# Patient Record
Sex: FEMALE | Race: WHITE | Employment: OTHER | URBAN - METROPOLITAN AREA
[De-identification: names, ages, dates, MRNs, and addresses within clinical notes are randomized per-mention and may not be internally consistent; named-entity substitution may affect disease eponyms.]

---

## 2019-05-13 ENCOUNTER — DOCUMENTATION ONLY (OUTPATIENT)
Dept: INTERNAL MEDICINE CLINIC | Facility: HOSPITAL | Age: 84
End: 2019-05-13

## 2020-01-07 ENCOUNTER — HOSPITAL ENCOUNTER (EMERGENCY)
Facility: HOSPITAL | Age: 85
Discharge: HOME | End: 2020-01-07
Attending: EMERGENCY MEDICINE
Payer: MEDICARE

## 2020-01-07 ENCOUNTER — APPOINTMENT (OUTPATIENT)
Dept: CT IMAGING | Facility: HOSPITAL | Age: 85
End: 2020-01-07
Attending: EMERGENCY MEDICINE
Payer: MEDICARE

## 2020-01-07 ENCOUNTER — APPOINTMENT (OUTPATIENT)
Dept: GENERAL RADIOLOGY | Facility: HOSPITAL | Age: 85
End: 2020-01-07
Attending: EMERGENCY MEDICINE
Payer: MEDICARE

## 2020-01-07 VITALS
WEIGHT: 146 LBS | DIASTOLIC BLOOD PRESSURE: 71 MMHG | RESPIRATION RATE: 18 BRPM | HEIGHT: 64 IN | SYSTOLIC BLOOD PRESSURE: 142 MMHG | HEART RATE: 72 BPM | TEMPERATURE: 97 F | BODY MASS INDEX: 24.92 KG/M2 | OXYGEN SATURATION: 98 %

## 2020-01-07 DIAGNOSIS — J20.6 ACUTE BRONCHITIS DUE TO RHINOVIRUS: Primary | ICD-10-CM

## 2020-01-07 LAB
ADENOVIRUS PCR:: NEGATIVE
ALBUMIN SERPL-MCNC: 3.7 G/DL (ref 3.4–5)
ALBUMIN/GLOB SERPL: 0.8 {RATIO} (ref 1–2)
ALP LIVER SERPL-CCNC: 63 U/L (ref 55–142)
ALT SERPL-CCNC: 12 U/L (ref 13–56)
ANION GAP SERPL CALC-SCNC: 9 MMOL/L (ref 0–18)
AST SERPL-CCNC: 18 U/L (ref 15–37)
ATRIAL RATE: 72 BPM
B PERT DNA SPEC QL NAA+PROBE: NEGATIVE
BASOPHILS # BLD AUTO: 0.06 X10(3) UL (ref 0–0.2)
BASOPHILS NFR BLD AUTO: 0.8 %
BILIRUB SERPL-MCNC: 0.4 MG/DL (ref 0.1–2)
BUN BLD-MCNC: 14 MG/DL (ref 7–18)
BUN/CREAT SERPL: 14 (ref 10–20)
C PNEUM DNA SPEC QL NAA+PROBE: NEGATIVE
CALCIUM BLD-MCNC: 10.1 MG/DL (ref 8.5–10.1)
CHLORIDE SERPL-SCNC: 106 MMOL/L (ref 98–112)
CO2 SERPL-SCNC: 23 MMOL/L (ref 21–32)
CORONAVIRUS 229E PCR:: NEGATIVE
CORONAVIRUS HKU1 PCR:: NEGATIVE
CORONAVIRUS NL63 PCR:: NEGATIVE
CORONAVIRUS OC43 PCR:: NEGATIVE
CREAT BLD-MCNC: 1 MG/DL (ref 0.55–1.02)
D-DIMER: 3.71 UG/ML FEU (ref ?–0.88)
DEPRECATED RDW RBC AUTO: 45.2 FL (ref 35.1–46.3)
EOSINOPHIL # BLD AUTO: 0.14 X10(3) UL (ref 0–0.7)
EOSINOPHIL NFR BLD AUTO: 1.8 %
ERYTHROCYTE [DISTWIDTH] IN BLOOD BY AUTOMATED COUNT: 13.2 % (ref 11–15)
FLUAV RNA SPEC QL NAA+PROBE: NEGATIVE
FLUBV RNA SPEC QL NAA+PROBE: NEGATIVE
GLOBULIN PLAS-MCNC: 4.7 G/DL (ref 2.8–4.4)
GLUCOSE BLD-MCNC: 92 MG/DL (ref 70–99)
HCT VFR BLD AUTO: 42.8 % (ref 35–48)
HGB BLD-MCNC: 14.4 G/DL (ref 12–16)
IMM GRANULOCYTES # BLD AUTO: 0.02 X10(3) UL (ref 0–1)
IMM GRANULOCYTES NFR BLD: 0.3 %
LIPASE SERPL-CCNC: 161 U/L (ref 73–393)
LYMPHOCYTES # BLD AUTO: 2.77 X10(3) UL (ref 1–4)
LYMPHOCYTES NFR BLD AUTO: 36.2 %
M PROTEIN MFR SERPL ELPH: 8.4 G/DL (ref 6.4–8.2)
MCH RBC QN AUTO: 31.3 PG (ref 26–34)
MCHC RBC AUTO-ENTMCNC: 33.6 G/DL (ref 31–37)
MCV RBC AUTO: 93 FL (ref 80–100)
METAPNEUMOVIRUS PCR:: NEGATIVE
MONOCYTES # BLD AUTO: 0.57 X10(3) UL (ref 0.1–1)
MONOCYTES NFR BLD AUTO: 7.5 %
MYCOPLASMA PNEUMONIA PCR:: NEGATIVE
NEUTROPHILS # BLD AUTO: 4.09 X10 (3) UL (ref 1.5–7.7)
NEUTROPHILS # BLD AUTO: 4.09 X10(3) UL (ref 1.5–7.7)
NEUTROPHILS NFR BLD AUTO: 53.4 %
NT-PROBNP SERPL-MCNC: 40 PG/ML (ref ?–450)
OSMOLALITY SERPL CALC.SUM OF ELEC: 286 MOSM/KG (ref 275–295)
P AXIS: 34 DEGREES
P-R INTERVAL: 170 MS
PARAINFLUENZA 1 PCR:: NEGATIVE
PARAINFLUENZA 2 PCR:: NEGATIVE
PARAINFLUENZA 3 PCR:: NEGATIVE
PARAINFLUENZA 4 PCR:: NEGATIVE
PLATELET # BLD AUTO: 288 10(3)UL (ref 150–450)
POTASSIUM SERPL-SCNC: 4.1 MMOL/L (ref 3.5–5.1)
Q-T INTERVAL: 394 MS
QRS DURATION: 86 MS
QTC CALCULATION (BEZET): 431 MS
R AXIS: -45 DEGREES
RBC # BLD AUTO: 4.6 X10(6)UL (ref 3.8–5.3)
RHINOVIRUS/ENTERO PCR:: POSITIVE
RSV RNA SPEC QL NAA+PROBE: NEGATIVE
SODIUM SERPL-SCNC: 138 MMOL/L (ref 136–145)
T AXIS: 14 DEGREES
TROPONIN I SERPL-MCNC: <0.045 NG/ML (ref ?–0.04)
VENTRICULAR RATE: 72 BPM
WBC # BLD AUTO: 7.7 X10(3) UL (ref 4–11)

## 2020-01-07 PROCEDURE — 87581 M.PNEUMON DNA AMP PROBE: CPT | Performed by: EMERGENCY MEDICINE

## 2020-01-07 PROCEDURE — 83690 ASSAY OF LIPASE: CPT | Performed by: EMERGENCY MEDICINE

## 2020-01-07 PROCEDURE — 83880 ASSAY OF NATRIURETIC PEPTIDE: CPT | Performed by: EMERGENCY MEDICINE

## 2020-01-07 PROCEDURE — 74177 CT ABD & PELVIS W/CONTRAST: CPT | Performed by: EMERGENCY MEDICINE

## 2020-01-07 PROCEDURE — 99285 EMERGENCY DEPT VISIT HI MDM: CPT | Performed by: EMERGENCY MEDICINE

## 2020-01-07 PROCEDURE — 93005 ELECTROCARDIOGRAM TRACING: CPT

## 2020-01-07 PROCEDURE — 87633 RESP VIRUS 12-25 TARGETS: CPT | Performed by: EMERGENCY MEDICINE

## 2020-01-07 PROCEDURE — 93010 ELECTROCARDIOGRAM REPORT: CPT | Performed by: EMERGENCY MEDICINE

## 2020-01-07 PROCEDURE — 84484 ASSAY OF TROPONIN QUANT: CPT | Performed by: EMERGENCY MEDICINE

## 2020-01-07 PROCEDURE — 71275 CT ANGIOGRAPHY CHEST: CPT | Performed by: EMERGENCY MEDICINE

## 2020-01-07 PROCEDURE — 80053 COMPREHEN METABOLIC PANEL: CPT | Performed by: EMERGENCY MEDICINE

## 2020-01-07 PROCEDURE — 87486 CHLMYD PNEUM DNA AMP PROBE: CPT | Performed by: EMERGENCY MEDICINE

## 2020-01-07 PROCEDURE — 36415 COLL VENOUS BLD VENIPUNCTURE: CPT | Performed by: EMERGENCY MEDICINE

## 2020-01-07 PROCEDURE — 85379 FIBRIN DEGRADATION QUANT: CPT | Performed by: EMERGENCY MEDICINE

## 2020-01-07 PROCEDURE — 85025 COMPLETE CBC W/AUTO DIFF WBC: CPT | Performed by: EMERGENCY MEDICINE

## 2020-01-07 PROCEDURE — 71045 X-RAY EXAM CHEST 1 VIEW: CPT | Performed by: EMERGENCY MEDICINE

## 2020-01-07 PROCEDURE — 87798 DETECT AGENT NOS DNA AMP: CPT | Performed by: EMERGENCY MEDICINE

## 2020-01-07 RX ORDER — MORPHINE SULFATE 4 MG/ML
4 INJECTION, SOLUTION INTRAMUSCULAR; INTRAVENOUS EVERY 30 MIN PRN
Status: DISCONTINUED | OUTPATIENT
Start: 2020-01-07 | End: 2020-01-07

## 2020-01-07 NOTE — ED INITIAL ASSESSMENT (HPI)
Arrives with c/o SOB that began this morning at 1000. Has a gallbladder stent that is to be removed today. Also has lower back pain.

## 2020-01-07 NOTE — ED PROVIDER NOTES
Patient Seen in: [facility] Emergency Department      History   Patient presents with:  Dyspnea FAMILIA SOB    Stated Complaint: shortness of breath, weakness - has gallbladder stent that was supposed to be r*    HPI    80year-old with history of hyper (36.2 °C)   Temp src Temporal   SpO2 98 %   O2 Device None (Room air)       Current:/71   Pulse 72   Temp 97.2 °F (36.2 °C) (Temporal)   Resp 18   Ht 162.6 cm (5' 4\")   Wt 66.2 kg   SpO2 98%   BMI 25.06 kg/m²         Physical Exam    General: Patien Abnormality         Status                     ---------                               -----------         ------                     CBC W/ DIFFERENTIAL[[id number]]                              Final result                 Please view results for these edilma

## 2020-01-07 NOTE — ED NOTES
Declined morphine at this time. Advised to notify staff if decides wants morphine. Verbalized understanding.

## 2020-10-28 ENCOUNTER — ORDER TRANSCRIPTION (OUTPATIENT)
Dept: ADMINISTRATIVE | Facility: HOSPITAL | Age: 85
End: 2020-10-28

## 2020-10-28 DIAGNOSIS — R06.02 SHORTNESS OF BREATH: Primary | ICD-10-CM

## 2020-10-29 ENCOUNTER — HOSPITAL ENCOUNTER (OUTPATIENT)
Dept: GENERAL RADIOLOGY | Facility: HOSPITAL | Age: 85
Discharge: HOME | End: 2020-10-29
Attending: NURSE PRACTITIONER
Payer: MEDICARE

## 2020-10-29 DIAGNOSIS — R06.02 SHORTNESS OF BREATH: ICD-10-CM

## 2020-10-29 PROCEDURE — 71046 X-RAY EXAM CHEST 2 VIEWS: CPT | Performed by: NURSE PRACTITIONER

## 2022-06-06 ENCOUNTER — ORDER TRANSCRIPTION (OUTPATIENT)
Dept: ADMINISTRATIVE | Facility: HOSPITAL | Age: 87
End: 2022-06-06

## 2022-06-06 DIAGNOSIS — R06.00 DYSPNEA ON EXERTION: Primary | ICD-10-CM

## 2024-05-25 ENCOUNTER — HOSPITAL ENCOUNTER (INPATIENT)
Facility: HOSPITAL | Age: 89
LOS: 5 days | Discharge: NURSING, REHAB OR LONG-TERM CARE | DRG: 418 | End: 2024-05-31
Attending: EMERGENCY MEDICINE | Admitting: HOSPITALIST
Payer: MEDICARE

## 2024-05-25 DIAGNOSIS — K80.50 CHOLEDOCHOLITHIASIS: ICD-10-CM

## 2024-05-25 DIAGNOSIS — N39.0 URINARY TRACT INFECTION WITHOUT HEMATURIA, SITE UNSPECIFIED: Primary | ICD-10-CM

## 2024-05-25 DIAGNOSIS — K80.50 BILIARY COLIC: ICD-10-CM

## 2024-05-25 LAB
ALBUMIN SERPL-MCNC: 3.5 G/DL (ref 3.4–5)
ALBUMIN/GLOB SERPL: 0.9 {RATIO} (ref 1–2)
ALP LIVER SERPL-CCNC: 67 U/L
ALT SERPL-CCNC: 14 U/L
ANION GAP SERPL CALC-SCNC: 8 MMOL/L (ref 0–18)
AST SERPL-CCNC: 22 U/L (ref 15–37)
BASOPHILS # BLD AUTO: 0.06 X10(3) UL (ref 0–0.2)
BASOPHILS NFR BLD AUTO: 0.7 %
BILIRUB SERPL-MCNC: 0.3 MG/DL (ref 0.1–2)
BUN BLD-MCNC: 10 MG/DL (ref 9–23)
CALCIUM BLD-MCNC: 9 MG/DL (ref 8.5–10.1)
CHLORIDE SERPL-SCNC: 102 MMOL/L (ref 98–112)
CO2 SERPL-SCNC: 23 MMOL/L (ref 21–32)
CREAT BLD-MCNC: 0.75 MG/DL
EGFRCR SERPLBLD CKD-EPI 2021: 75 ML/MIN/1.73M2 (ref 60–?)
EOSINOPHIL # BLD AUTO: 0.16 X10(3) UL (ref 0–0.7)
EOSINOPHIL NFR BLD AUTO: 2 %
ERYTHROCYTE [DISTWIDTH] IN BLOOD BY AUTOMATED COUNT: 12.7 %
GLOBULIN PLAS-MCNC: 3.8 G/DL (ref 2.8–4.4)
GLUCOSE BLD-MCNC: 121 MG/DL (ref 70–99)
HCT VFR BLD AUTO: 40.9 %
HGB BLD-MCNC: 13.7 G/DL
IMM GRANULOCYTES # BLD AUTO: 0.04 X10(3) UL (ref 0–1)
IMM GRANULOCYTES NFR BLD: 0.5 %
LIPASE SERPL-CCNC: 49 U/L (ref 13–75)
LYMPHOCYTES # BLD AUTO: 1.94 X10(3) UL (ref 1–4)
LYMPHOCYTES NFR BLD AUTO: 24 %
MCH RBC QN AUTO: 30.7 PG (ref 26–34)
MCHC RBC AUTO-ENTMCNC: 33.5 G/DL (ref 31–37)
MCV RBC AUTO: 91.7 FL
MONOCYTES # BLD AUTO: 0.52 X10(3) UL (ref 0.1–1)
MONOCYTES NFR BLD AUTO: 6.4 %
NEUTROPHILS # BLD AUTO: 5.35 X10 (3) UL (ref 1.5–7.7)
NEUTROPHILS # BLD AUTO: 5.35 X10(3) UL (ref 1.5–7.7)
NEUTROPHILS NFR BLD AUTO: 66.4 %
OSMOLALITY SERPL CALC.SUM OF ELEC: 276 MOSM/KG (ref 275–295)
PLATELET # BLD AUTO: 250 10(3)UL (ref 150–450)
POTASSIUM SERPL-SCNC: 4 MMOL/L (ref 3.5–5.1)
PROT SERPL-MCNC: 7.3 G/DL (ref 6.4–8.2)
RBC # BLD AUTO: 4.46 X10(6)UL
SODIUM SERPL-SCNC: 133 MMOL/L (ref 136–145)
WBC # BLD AUTO: 8.1 X10(3) UL (ref 4–11)

## 2024-05-25 RX ORDER — ALPRAZOLAM 0.25 MG/1
0.25 TABLET ORAL 2 TIMES DAILY PRN
COMMUNITY

## 2024-05-25 RX ORDER — MORPHINE SULFATE 4 MG/ML
4 INJECTION, SOLUTION INTRAMUSCULAR; INTRAVENOUS EVERY 30 MIN PRN
Status: DISCONTINUED | OUTPATIENT
Start: 2024-05-25 | End: 2024-05-25

## 2024-05-25 RX ORDER — VALSARTAN 80 MG/1
80 TABLET ORAL DAILY
Status: ON HOLD | COMMUNITY
End: 2024-05-31

## 2024-05-25 RX ORDER — KETOROLAC TROMETHAMINE 15 MG/ML
15 INJECTION, SOLUTION INTRAMUSCULAR; INTRAVENOUS ONCE
Status: COMPLETED | OUTPATIENT
Start: 2024-05-25 | End: 2024-05-25

## 2024-05-25 RX ORDER — ONDANSETRON 2 MG/ML
4 INJECTION INTRAMUSCULAR; INTRAVENOUS ONCE
Status: COMPLETED | OUTPATIENT
Start: 2024-05-25 | End: 2024-05-25

## 2024-05-25 RX ORDER — SODIUM CHLORIDE 9 MG/ML
INJECTION, SOLUTION INTRAVENOUS CONTINUOUS
Status: DISCONTINUED | OUTPATIENT
Start: 2024-05-25 | End: 2024-05-30

## 2024-05-25 RX ORDER — MORPHINE SULFATE 2 MG/ML
2 INJECTION, SOLUTION INTRAMUSCULAR; INTRAVENOUS EVERY 30 MIN PRN
Status: DISCONTINUED | OUTPATIENT
Start: 2024-05-25 | End: 2024-05-30

## 2024-05-25 RX ORDER — VENLAFAXINE HYDROCHLORIDE 75 MG/1
37.5 TABLET, EXTENDED RELEASE ORAL DAILY
COMMUNITY

## 2024-05-26 ENCOUNTER — APPOINTMENT (OUTPATIENT)
Dept: ULTRASOUND IMAGING | Facility: HOSPITAL | Age: 89
DRG: 418 | End: 2024-05-26
Attending: EMERGENCY MEDICINE
Payer: MEDICARE

## 2024-05-26 PROBLEM — K80.50 CHOLEDOCHOLITHIASIS: Status: ACTIVE | Noted: 2024-05-26

## 2024-05-26 PROBLEM — N39.0 URINARY TRACT INFECTION WITHOUT HEMATURIA, SITE UNSPECIFIED: Status: ACTIVE | Noted: 2024-05-26

## 2024-05-26 PROBLEM — K80.50 BILIARY COLIC: Status: ACTIVE | Noted: 2024-05-26

## 2024-05-26 PROBLEM — Z71.89 ACP (ADVANCE CARE PLANNING): Status: ACTIVE | Noted: 2024-05-26

## 2024-05-26 LAB
ALBUMIN SERPL-MCNC: 3.7 G/DL (ref 3.4–5)
ALBUMIN/GLOB SERPL: 0.9 {RATIO} (ref 1–2)
ALP LIVER SERPL-CCNC: 56 U/L
ALT SERPL-CCNC: 12 U/L
ANION GAP SERPL CALC-SCNC: 6 MMOL/L (ref 0–18)
AST SERPL-CCNC: 19 U/L (ref 15–37)
ATRIAL RATE: 95 BPM
BASOPHILS # BLD AUTO: 0.05 X10(3) UL (ref 0–0.2)
BASOPHILS NFR BLD AUTO: 0.6 %
BILIRUB SERPL-MCNC: 0.4 MG/DL (ref 0.1–2)
BILIRUB UR QL STRIP.AUTO: NEGATIVE
BUN BLD-MCNC: 5 MG/DL (ref 9–23)
CALCIUM BLD-MCNC: 9.2 MG/DL (ref 8.5–10.1)
CHLORIDE SERPL-SCNC: 106 MMOL/L (ref 98–112)
CLARITY UR REFRACT.AUTO: CLEAR
CO2 SERPL-SCNC: 25 MMOL/L (ref 21–32)
COLOR UR AUTO: COLORLESS
CREAT BLD-MCNC: 0.68 MG/DL
EGFRCR SERPLBLD CKD-EPI 2021: 82 ML/MIN/1.73M2 (ref 60–?)
EOSINOPHIL # BLD AUTO: 0.19 X10(3) UL (ref 0–0.7)
EOSINOPHIL NFR BLD AUTO: 2.3 %
ERYTHROCYTE [DISTWIDTH] IN BLOOD BY AUTOMATED COUNT: 12.8 %
GLOBULIN PLAS-MCNC: 3.9 G/DL (ref 2.8–4.4)
GLUCOSE BLD-MCNC: 143 MG/DL (ref 70–99)
GLUCOSE UR STRIP.AUTO-MCNC: NORMAL MG/DL
HCT VFR BLD AUTO: 40.4 %
HGB BLD-MCNC: 13.6 G/DL
IMM GRANULOCYTES # BLD AUTO: 0.04 X10(3) UL (ref 0–1)
IMM GRANULOCYTES NFR BLD: 0.5 %
KETONES UR STRIP.AUTO-MCNC: NEGATIVE MG/DL
LEUKOCYTE ESTERASE UR QL STRIP.AUTO: 75
LYMPHOCYTES # BLD AUTO: 1.54 X10(3) UL (ref 1–4)
LYMPHOCYTES NFR BLD AUTO: 18.5 %
MCH RBC QN AUTO: 31.2 PG (ref 26–34)
MCHC RBC AUTO-ENTMCNC: 33.7 G/DL (ref 31–37)
MCV RBC AUTO: 92.7 FL
MONOCYTES # BLD AUTO: 0.54 X10(3) UL (ref 0.1–1)
MONOCYTES NFR BLD AUTO: 6.5 %
NEUTROPHILS # BLD AUTO: 5.95 X10 (3) UL (ref 1.5–7.7)
NEUTROPHILS # BLD AUTO: 5.95 X10(3) UL (ref 1.5–7.7)
NEUTROPHILS NFR BLD AUTO: 71.6 %
OSMOLALITY SERPL CALC.SUM OF ELEC: 284 MOSM/KG (ref 275–295)
P AXIS: 72 DEGREES
P-R INTERVAL: 196 MS
PH UR STRIP.AUTO: 7.5 [PH] (ref 5–8)
PLATELET # BLD AUTO: 264 10(3)UL (ref 150–450)
POTASSIUM SERPL-SCNC: 3.9 MMOL/L (ref 3.5–5.1)
PROT SERPL-MCNC: 7.6 G/DL (ref 6.4–8.2)
PROT UR STRIP.AUTO-MCNC: NEGATIVE MG/DL
Q-T INTERVAL: 366 MS
QRS DURATION: 86 MS
QTC CALCULATION (BEZET): 459 MS
R AXIS: -23 DEGREES
RBC # BLD AUTO: 4.36 X10(6)UL
RBC UR QL AUTO: NEGATIVE
SARS-COV-2 RNA RESP QL NAA+PROBE: NOT DETECTED
SODIUM SERPL-SCNC: 137 MMOL/L (ref 136–145)
SP GR UR STRIP.AUTO: 1.01 (ref 1–1.03)
T AXIS: 41 DEGREES
UROBILINOGEN UR STRIP.AUTO-MCNC: NORMAL MG/DL
VENTRICULAR RATE: 95 BPM
WBC # BLD AUTO: 8.3 X10(3) UL (ref 4–11)

## 2024-05-26 PROCEDURE — 99222 1ST HOSP IP/OBS MODERATE 55: CPT | Performed by: STUDENT IN AN ORGANIZED HEALTH CARE EDUCATION/TRAINING PROGRAM

## 2024-05-26 PROCEDURE — 76705 ECHO EXAM OF ABDOMEN: CPT | Performed by: EMERGENCY MEDICINE

## 2024-05-26 PROCEDURE — 99223 1ST HOSP IP/OBS HIGH 75: CPT | Performed by: HOSPITALIST

## 2024-05-26 PROCEDURE — 99497 ADVNCD CARE PLAN 30 MIN: CPT | Performed by: HOSPITALIST

## 2024-05-26 RX ORDER — SENNOSIDES 8.6 MG
17.2 TABLET ORAL NIGHTLY PRN
Status: DISCONTINUED | OUTPATIENT
Start: 2024-05-26 | End: 2024-05-31

## 2024-05-26 RX ORDER — METOCLOPRAMIDE HYDROCHLORIDE 5 MG/ML
10 INJECTION INTRAMUSCULAR; INTRAVENOUS EVERY 8 HOURS PRN
Status: DISCONTINUED | OUTPATIENT
Start: 2024-05-26 | End: 2024-05-31

## 2024-05-26 RX ORDER — MELATONIN
3 NIGHTLY PRN
Status: DISCONTINUED | OUTPATIENT
Start: 2024-05-26 | End: 2024-05-31

## 2024-05-26 RX ORDER — MORPHINE SULFATE 4 MG/ML
4 INJECTION, SOLUTION INTRAMUSCULAR; INTRAVENOUS EVERY 2 HOUR PRN
Status: DISCONTINUED | OUTPATIENT
Start: 2024-05-26 | End: 2024-05-30

## 2024-05-26 RX ORDER — BISACODYL 10 MG
10 SUPPOSITORY, RECTAL RECTAL
Status: DISCONTINUED | OUTPATIENT
Start: 2024-05-26 | End: 2024-05-31

## 2024-05-26 RX ORDER — ENEMA 19; 7 G/133ML; G/133ML
1 ENEMA RECTAL ONCE AS NEEDED
Status: DISCONTINUED | OUTPATIENT
Start: 2024-05-26 | End: 2024-05-31

## 2024-05-26 RX ORDER — ALPRAZOLAM 0.25 MG/1
0.25 TABLET ORAL 2 TIMES DAILY PRN
Status: DISCONTINUED | OUTPATIENT
Start: 2024-05-26 | End: 2024-05-31

## 2024-05-26 RX ORDER — MORPHINE SULFATE 2 MG/ML
1 INJECTION, SOLUTION INTRAMUSCULAR; INTRAVENOUS EVERY 2 HOUR PRN
Status: DISCONTINUED | OUTPATIENT
Start: 2024-05-26 | End: 2024-05-30

## 2024-05-26 RX ORDER — LOSARTAN POTASSIUM 50 MG/1
50 TABLET ORAL DAILY
Status: DISCONTINUED | OUTPATIENT
Start: 2024-05-26 | End: 2024-05-29

## 2024-05-26 RX ORDER — ACETAMINOPHEN 500 MG
1000 TABLET ORAL EVERY 4 HOURS PRN
Status: DISCONTINUED | OUTPATIENT
Start: 2024-05-26 | End: 2024-05-31

## 2024-05-26 RX ORDER — ECHINACEA PURPUREA EXTRACT 125 MG
1 TABLET ORAL
Status: DISCONTINUED | OUTPATIENT
Start: 2024-05-26 | End: 2024-05-31

## 2024-05-26 RX ORDER — DEXTROSE, SODIUM CHLORIDE, SODIUM LACTATE, POTASSIUM CHLORIDE, AND CALCIUM CHLORIDE 5; .6; .31; .03; .02 G/100ML; G/100ML; G/100ML; G/100ML; G/100ML
100 INJECTION, SOLUTION INTRAVENOUS CONTINUOUS
Status: DISCONTINUED | OUTPATIENT
Start: 2024-05-26 | End: 2024-05-30

## 2024-05-26 RX ORDER — LEVOTHYROXINE SODIUM 0.05 MG/1
50 TABLET ORAL
Status: DISCONTINUED | OUTPATIENT
Start: 2024-05-26 | End: 2024-05-31

## 2024-05-26 RX ORDER — POLYETHYLENE GLYCOL 3350 17 G/17G
17 POWDER, FOR SOLUTION ORAL DAILY PRN
Status: DISCONTINUED | OUTPATIENT
Start: 2024-05-26 | End: 2024-05-31

## 2024-05-26 RX ORDER — MORPHINE SULFATE 2 MG/ML
2 INJECTION, SOLUTION INTRAMUSCULAR; INTRAVENOUS EVERY 2 HOUR PRN
Status: DISCONTINUED | OUTPATIENT
Start: 2024-05-26 | End: 2024-05-30

## 2024-05-26 RX ORDER — ONDANSETRON 2 MG/ML
4 INJECTION INTRAMUSCULAR; INTRAVENOUS EVERY 6 HOURS PRN
Status: DISCONTINUED | OUTPATIENT
Start: 2024-05-26 | End: 2024-05-31

## 2024-05-26 RX ORDER — VENLAFAXINE HYDROCHLORIDE 37.5 MG/1
37.5 CAPSULE, EXTENDED RELEASE ORAL DAILY
Status: DISCONTINUED | OUTPATIENT
Start: 2024-05-26 | End: 2024-05-31

## 2024-05-26 NOTE — CONSULTS
[facility]  Report of Surgical Consultation with History and Physical Exam    Noemy Barrios Patient Status:  Inpatient    1931 MRN [id number]   Location [facility] 4NW-A Attending Zack Lara*   Hosp Day # 0 PCP Andrew Montilla MD     Reason for Surgical Consultation: I am seeing this patient at the request of Dr. Mauro for choledocholithiasis    History of Present Illness:  Noemy Barrios is a a(n) 89 year old female who presented to hospital with abdominal pain.  Patient states that after dinner yesterday, she began to feel some abdominal pain.  The abdominal pain got progressively worse prompting her presentation to the emergency room.  She denies any nausea or vomiting.  In the emergency room, patient was hemodynamically stable.  Serology was all normal.  She underwent ultrasound imaging which showed cholelithiasis with a dilated common bile duct concerning for choledocholithiasis.  General surgery was consulted for further evaluation and management.    Today, patient reports improvement of her pain.  She denies any other symptoms.  Patient does have a significant history of choledocholithiasis in 2019 which required ERCP and stent placement.  Patient apparently had a 1 cm stone that was removed from the common bile duct and 2 biliary stents were placed.  It is unclear if patient had repeat ERCP and stent removal.  Patient denies any abdominal surgeries.  Patient does not take any blood thinning medication.      History:  Past Medical History:    Depression    Hearing impairment    Psychiatric disorder    ANXIETY    Thyroid disease    Unspecified essential hypertension    Visual impairment       Past Surgical History:   Procedure Laterality Date    Colonoscopy         History reviewed. No pertinent family history.     reports that she has never smoked. She has never used smokeless tobacco. She reports current alcohol use.      Allergies:  No Known  Allergies      Medications:    Current Facility-Administered Medications:     levothyroxine (Synthroid) tab 50 mcg, 50 mcg, Oral, Before breakfast    ALPRAZolam (Xanax) tab 0.25 mg, 0.25 mg, Oral, BID PRN    venlafaxine ER (Effexor-XR) 24 hr cap 37.5 mg, 37.5 mg, Oral, Daily    losartan (Cozaar) tab 50 mg, 50 mg, Oral, Daily    acetaminophen (Tylenol Extra Strength) tab 1,000 mg, 1,000 mg, Oral, Q4H PRN    melatonin tab 3 mg, 3 mg, Oral, Nightly PRN    glycerin-hypromellose- (Artificial Tears) 0.2-0.2-1 % ophthalmic solution 1 drop, 1 drop, Both Eyes, QID PRN    sodium chloride (Saline Mist) 0.65 % nasal solution 1 spray, 1 spray, Each Nare, Q3H PRN    dextrose in lactated ringers 5% infusion, 100 mL/hr, Intravenous, Continuous    ondansetron (Zofran) 4 MG/2ML injection 4 mg, 4 mg, Intravenous, Q6H PRN    metoclopramide (Reglan) 5 mg/mL injection 10 mg, 10 mg, Intravenous, Q8H PRN    polyethylene glycol (PEG 3350) (Miralax) 17 g oral packet 17 g, 17 g, Oral, Daily PRN    sennosides (Senokot) tab 17.2 mg, 17.2 mg, Oral, Nightly PRN    bisacodyl (Dulcolax) 10 MG rectal suppository 10 mg, 10 mg, Rectal, Daily PRN    fleet enema (Fleet) 7-19 GM/118ML rectal enema 133 mL, 1 enema, Rectal, Once PRN    morphINE PF 2 MG/ML injection 1 mg, 1 mg, Intravenous, Q2H PRN **OR** morphINE PF 2 MG/ML injection 2 mg, 2 mg, Intravenous, Q2H PRN **OR** morphINE PF 4 MG/ML injection 4 mg, 4 mg, Intravenous, Q2H PRN    [START ON 5/27/2024] cefTRIAXone (Rocephin) 1 g in D5W 100 mL IVPB-ADD, 1 g, Intravenous, Q24H    sodium chloride 0.9% infusion, , Intravenous, Continuous    morphINE PF 2 MG/ML injection 2 mg, 2 mg, Intravenous, Q30 Min PRN      Review of Systems:  The review of systems was negative other than the above listed HPI and past medical history including the HEENT, Heart, Lungs, GI, , Neuro, Musculoskeletal, Hematologic, Endocrine and Psych.       Physical Exam:  Blood pressure 139/75, pulse 81, temperature 97.9 °F  (36.6 °C), temperature source Oral, resp. rate 14, weight 143 lb 4.8 oz (65 kg), SpO2 92%.  General: Alert, orientated x3.  Cooperative.  No apparent distress.  HEENT: Exam is unremarkable.  Without scleral icterus.  Mucous membranes are moist. EOM are WNL.  Neck: No tenderness to palpitation.  Full range of motion to flexion and extension, lateral rotation and lateral flexion of cervical spine.  No JVD. Supple.   Lungs: Bilateral chest rise. Good excursion of the diaphragms. No secondary use of accessory respiratory musculature.  Cardiac: Regular rate and rhythm. No murmur.  Abdomen: Soft, nontender, nondistended  Extremities:  No lower extremity edema noted.  Without clubbing or cyanosis.  2+ pulses x4  Skin: Normal texture and turgor.      Laboratory Data and Relevant Imaging:  Recent Labs   Lab 05/25/24  2233   RBC 4.46   HGB 13.7   HCT 40.9   MCV 91.7   MCH 30.7   MCHC 33.5   RDW 12.7   NEPRELIM 5.35   WBC 8.1   .0       Recent Labs   Lab 05/25/24  2233   *   BUN 10   CREATSERUM 0.75   CA 9.0   ALB 3.5   *   K 4.0      CO2 23.0   ALKPHO 67   AST 22   ALT 14   BILT 0.3   TP 7.3         No results for input(s): \"PTP\", \"INR\", \"PTT\" in the last 168 hours.    Imaging  US GALLBLADDER (CPT=76705)    Result Date: 5/26/2024  CONCLUSION:  1. Dilatation of the common duct measuring up to 1.1 cm is noted.  Questionable debris versus a stone in the distal aspect of duct may represent choledocholithiasis.  This may be further evaluated with ERCP.  Cholelithiasis is noted without evidence of cholecystitis.   LOCATION:  [facility]    Dictated by (CST): Deangelo Purcell MD on 5/26/2024 at 10:45 AM     Finalized by (CST): Deangelo Purcell MD on 5/26/2024 at 10:46 AM          Impression/Plan:  Patient Active Problem List   Diagnosis    Chest pain    Dyspnea    GERD (gastroesophageal reflux disease)    HTN (hypertension)    Urinary tract infection without hematuria, site unspecified    ACP (advance care planning)     Biliary colic    Choledocholithiasis       89 year old female with choledocholithiasis    Ultrasound images were reviewed personally by me  Shows patient with large gallstone and a dilated common bile duct, no pericholecystic fluid or evidence of acute cholecystitis  No acute surgical intervention    On chart review, patient has had a history of choledocholithiasis with ERCP and stent placement at outside facility  Is unclear if patient had stent removed  A CT of the abdomen and pelvis performed in 2020 after patient's episode of choledocholithiasis showed soft tissue densities in the gallbladder, a neoplastic process could not be ruled out  No obvious findings were on ultrasound from today  After discussion with GI, will obtain MRCP to see if there is any retained stent or any abnormalities of the gallbladder    I briefly discussed with the patient and her family that for choledocholithiasis, I recommend moving forward with laparoscopic cholecystectomy  Recommend surgical risk assessment  N.p.o. for MRCP, clear liquids after, n.p.o. at midnight  Surgery will continue to follow  Rest of care per primary    Thank you for letting me participate in the care of this patient    Emily Mae MD  5/26/2024  1:26 PM

## 2024-05-26 NOTE — CONSULTS
Consultation Note        Noemy ABHISHEK Sophie Patient Status:  Inpatient    1931 MRN [id number]   [facility] 4NW-A Attending Zack Lara*   Hosp Day # 0 PCP Andrew Montilla MD       Reason for Consultation   Abdominal pain         History of Present Illness   -Patient is a pleasant 89-year-old female with history of gallstones for which she underwent an ERCP with biliary stent in 2019 at [place], who presents with abdominal pain, right upper quadrant, not associated with fever, vomiting or diarrhea  -Had cholelithiasis and choledocholithiasis diagnosed in 2019, and was recommended cholecystectomy at the time, but chose not to follow through  - Does not recall ever going back for recommended ERCP, unsure if the bile duct stent was ever removed  -Received 1 additional dose of medication for milder pain today, but overall feels better           PMH/MEDS/ALLERGIES/SH/FH:     Past Medical History:    Depression    Hearing impairment    Psychiatric disorder    ANXIETY    Thyroid disease    Unspecified essential hypertension    Visual impairment      Past Surgical History:   Procedure Laterality Date    Colonoscopy          No recently discontinued medications to reconcile   No current facility-administered medications on file prior to encounter.     Current Outpatient Medications on File Prior to Encounter   Medication Sig Dispense Refill    ALPRAZolam 0.25 MG Oral Tab Take 1 tablet (0.25 mg total) by mouth 2 (two) times daily as needed for Anxiety.      denosumab 60 MG/ML Subcutaneous Solution Prefilled Syringe Inject 1 mL (60 mg total) into the skin one time.      valsartan 80 MG Oral Tab Take 1 tablet (80 mg total) by mouth daily.      Venlafaxine HCl ER 75 MG Oral Tablet 24 Hr Take 0.5 tablets (37.5 mg total) by mouth daily.      Levothyroxine Sodium (SYNTHROID, LEVOTHROID) 75 MCG Oral Tab Take 50 mcg by mouth before breakfast.      Cholecalciferol (VITAMIN D3) 250 MCG (88038  UT) Oral Cap Take by mouth. (Patient not taking: Reported on 5/25/2024)      metoprolol Tartrate (LOPRESSOR) 25 MG Oral Tab Take 0.5 tablets by mouth 2 (two) times daily. (Patient not taking: Reported on 5/25/2024) 30 tablet 11    Vitamin B-12 (VITAMIN B12) 500 MCG Oral Tab Take 500 mcg by mouth daily. (Patient not taking: Reported on 5/25/2024)      aspirin 81 MG Oral Tab Take 81 mg by mouth daily. Taken 3 times a week - Monday Wednesday and Friday (Patient not taking: Reported on 5/25/2024)         Current Allergies: No Known Allergies    Social History     Occupational History    Not on file   Tobacco Use    Smoking status: Never    Smokeless tobacco: Never   Substance and Sexual Activity    Alcohol use: Yes     Comment: VERY RARE    Drug use: Not on file    Sexual activity: Not on file       Marital Status:    Lives alone?:    Occupation:   Taking fiber supplements?:    Tattoos?:   Body piercing?:   High risk sexual behavior?:    Advance Directive:          History reviewed. No pertinent family history.           MEDICATIONS      [COMPLETED] cefTRIAXone (Rocephin) 1 g in D5W 100 mL IVPB-ADD  1 g Intravenous Once    levothyroxine (Synthroid) tab 50 mcg  50 mcg Oral Before breakfast    ALPRAZolam (Xanax) tab 0.25 mg  0.25 mg Oral BID PRN    venlafaxine ER (Effexor-XR) 24 hr cap 37.5 mg  37.5 mg Oral Daily    losartan (Cozaar) tab 50 mg  50 mg Oral Daily    acetaminophen (Tylenol Extra Strength) tab 1,000 mg  1,000 mg Oral Q4H PRN    melatonin tab 3 mg  3 mg Oral Nightly PRN    glycerin-hypromellose- (Artificial Tears) 0.2-0.2-1 % ophthalmic solution 1 drop  1 drop Both Eyes QID PRN    sodium chloride (Saline Mist) 0.65 % nasal solution 1 spray  1 spray Each Nare Q3H PRN    dextrose in lactated ringers 5% infusion  100 mL/hr Intravenous Continuous    ondansetron (Zofran) 4 MG/2ML injection 4 mg  4 mg Intravenous Q6H PRN    metoclopramide (Reglan) 5 mg/mL injection 10 mg  10 mg Intravenous Q8H PRN     polyethylene glycol (PEG 3350) (Miralax) 17 g oral packet 17 g  17 g Oral Daily PRN    sennosides (Senokot) tab 17.2 mg  17.2 mg Oral Nightly PRN    bisacodyl (Dulcolax) 10 MG rectal suppository 10 mg  10 mg Rectal Daily PRN    fleet enema (Fleet) 7-19 GM/118ML rectal enema 133 mL  1 enema Rectal Once PRN    morphINE PF 2 MG/ML injection 1 mg  1 mg Intravenous Q2H PRN    Or    morphINE PF 2 MG/ML injection 2 mg  2 mg Intravenous Q2H PRN    Or    morphINE PF 4 MG/ML injection 4 mg  4 mg Intravenous Q2H PRN    [START ON 5/27/2024] cefTRIAXone (Rocephin) 1 g in D5W 100 mL IVPB-ADD  1 g Intravenous Q24H    sodium chloride 0.9% infusion   Intravenous Continuous    [COMPLETED] ondansetron (Zofran) 4 MG/2ML injection 4 mg  4 mg Intravenous Once    [COMPLETED] ketorolac (Toradol) 15 MG/ML injection 15 mg  15 mg Intravenous Once    morphINE PF 2 MG/ML injection 2 mg  2 mg Intravenous Q30 Min PRN              ROS:     A comprehensive 14 point review of systems was completed.  Pertinent positives and negatives noted in the the HPI.          Physical Exam     Vital signs:  /75 (BP Location: Right arm)   Pulse 81   Temp 97.9 °F (36.6 °C) (Oral)   Resp 14   Wt 143 lb 4.8 oz (65 kg)   SpO2 92%   BMI 24.60 kg/m²         Physical Exam        General: Appears alert, oriented x 3 and in no acute distress.  Hard of hearing  HEENT: Normal. No scleral icterus.   NECK: Supple. No neck vein distention. Thyroid not enlarged. No lymphadenopathy.  CV: S1 and S2 normal. No murmurs or gallops.  LUNGS: Clear to percussion and auscultation.  ABDOMEN: Soft and non-distended. Non-tender. No masses. Bowel sounds are present.  BACK: No CVA tenderness.  EXTREMITIES: No edema, cyanosis or clubbing.  SKIN: Warm and dry.         IMAGING/LABS       Labs:   Lab Results   Component Value Date    WBC 8.3 05/26/2024    HGB 13.6 05/26/2024    HCT 40.4 05/26/2024    .0 05/26/2024    CREATSERUM 0.68 05/26/2024    BUN 5 05/26/2024      05/26/2024    K 3.9 05/26/2024     05/26/2024    CO2 25.0 05/26/2024     05/26/2024    CA 9.2 05/26/2024    ALB 3.7 05/26/2024    ALKPHO 56 05/26/2024    BILT 0.4 05/26/2024    AST 19 05/26/2024    ALT 12 05/26/2024    LIP 49 05/25/2024     Recent Labs   Lab 05/25/24  2233 05/26/24  1353   * 143*   BUN 10 5*   CREATSERUM 0.75 0.68   CA 9.0 9.2   * 137   K 4.0 3.9    106   CO2 23.0 25.0     Recent Labs   Lab 05/26/24  1353   RBC 4.36   HGB 13.6   HCT 40.4   MCV 92.7   MCH 31.2   MCHC 33.7   RDW 12.8   NEPRELIM 5.95   WBC 8.3   .0       Recent Labs   Lab 05/25/24  2233 05/26/24  1353   ALT 14 12*   AST 22 19       Imaging:   US GALLBLADDER (CPT=76705)  Narrative: PROCEDURE:  US GALLBLADDER (CPT=76705)     COMPARISON:  None.     INDICATIONS:  pain abd     TECHNIQUE:  Real time gray-scale ultrasound was used to evaluate the gallbladder.       PATIENT STATED HISTORY: (As transcribed by Technologist)           FINDINGS:       BILIARY:  Cholelithiasis is noted with layering sludge in the gallbladder.  Negative sonographic Parker sign.  Common duct is distended measuring up to 11.3 cm.  There is suggestion of debris versus a stone in the distal aspect of the duct which may   represent choledocholithiasis.                   Impression: CONCLUSION:    1. Dilatation of the common duct measuring up to 1.1 cm is noted.  Questionable debris versus a stone in the distal aspect of duct may represent choledocholithiasis.  This may be further evaluated with ERCP.  Cholelithiasis is noted without evidence of   cholecystitis.        LOCATION:  [facility]           Dictated by (CST): Deangelo Purcell MD on 5/26/2024 at 10:45 AM       Finalized by (CST): Deangelo Purcell MD on 5/26/2024 at 10:46 AM        CTA Chest/aBd/pelvis 1/2020-  CONCLUSION:    1. No CT evidence for pulmonary embolism.   2. Biliary stent with pneumobilia.  Cholelithiasis with associated soft tissue density and adjacent soft tissue  density mass, neoplastic process cannot be excluded, correlate clinically.   3. Vertebral plana of the T9 vertebral body of indeterminate age.  This was not present on most recent CTA of the chest performed 7/1/2014, correlate clinically.   4. Diverticular disease without evidence for acute diverticulitis.          IMPRESSION:   1.  89-year-old female with a history of cholelithiasis, choledocholithiasis, biliary stenting at [facility] in 2019, presenting for evaluation of right upper quadrant abdominal pain.  -LFTs are normal, ultrasound suggests cholelithiasis and choledocholithiasis  - Prior CT imaging from 2020 with question of possible soft tissue mass adjacent to gallbladder             PLAN:     1.  Plan MRI, MRCP  2.  Repeat CBC CMP  3.  Surgical consult-discussed with Dr. Mae  4.  Pending results, consider ERCP         Jose Miranda MD  3:22 PM  5/26/2024  [facility] [place] [facility]  [phone]

## 2024-05-26 NOTE — PLAN OF CARE
NURSING ADMISSION NOTE      Patient admitted via Cart  Oriented to room.  Safety precautions initiated.  Bed in low position.  Call light in reach.    Pt received A&Ox4. VSS. Afebrile. C/o mild abdominal-back pain. Denies pain meds. All meds given per MAR. Admission navigator and med rec complete. Dextrose in LR 5% infusing @100mL/h. Fall precautions in place. Call light within reach.

## 2024-05-26 NOTE — ED PROVIDER NOTES
Patient Seen in: [facility] Emergency Department      History     Chief Complaint   Patient presents with    Abdomen/Flank Pain     Started at 1800 tonight. Took Tylenol PTA. Hx of gallstones.     Stated Complaint:     Subjective:   HPI    89-year-old female present emergency department for abdominal pain right upper quadrant.  No nausea vomiting no diarrhea no cough or cold no other exacerbating factors or associated symptoms    Objective:   Past Medical History:    Depression    Hearing impairment    Psychiatric disorder    ANXIETY    Thyroid disease    Unspecified essential hypertension    Visual impairment              Past Surgical History:   Procedure Laterality Date    Colonoscopy                  Social History     Socioeconomic History    Marital status:    Tobacco Use    Smoking status: Never    Smokeless tobacco: Never   Substance and Sexual Activity    Alcohol use: Yes     Comment: VERY RARE              Review of Systems    Positive for stated complaint:   Other systems are as noted in HPI.  Constitutional and vital signs reviewed.      All other systems reviewed and negative except as noted above.    Physical Exam     ED Triage Vitals [05/25/24 2228]   BP (!) 184/96   Pulse 74   Resp 25   Temp 97.2 °F (36.2 °C)   Temp src Temporal   SpO2 96 %   O2 Device None (Room air)       Current Vitals:   Vital Signs  BP: 130/75  Pulse: 88  Resp: 18  Temp: 97.2 °F (36.2 °C)  Temp src: Temporal  MAP (mmHg): (!) 108    Oxygen Therapy  SpO2: 95 %  O2 Device: None (Room air)            Physical Exam  Awake alert patient appears no distress HEENT exam is normal lungs are clear cardiovascular exam regular rhythm abdomen soft tender right upper quadrant no rebound no guarding no hepatosplenomegaly extremities no COVID cyanosis or edema no rash back exam is normal skin is nondiaphoretic no focal neurologic deficits       ED Course     Labs Reviewed   COMP METABOLIC PANEL (14) - Abnormal; Notable for the  following components:       Result Value    Glucose 121 (*)     Sodium 133 (*)     A/G Ratio 0.9 (*)     All other components within normal limits   URINALYSIS WITH CULTURE REFLEX - Abnormal; Notable for the following components:    Urine Color Colorless (*)     Nitrite Urine 2+ (*)     Leukocyte Esterase Urine 75 (*)     WBC Urine 11-20 (*)     Bacteria Urine Rare (*)     Squamous Epi. Cells Few (*)     All other components within normal limits   LIPASE - Normal   CBC WITH DIFFERENTIAL WITH PLATELET    Narrative:     The following orders were created for panel order CBC With Differential With Platelet.  Procedure                               Abnormality         Status                     ---------                               -----------         ------                     CBC W/ DIFFERENTIAL[[id number]]                              Final result                 Please view results for these tests on the individual orders.   RAINBOW DRAW BLUE   RAINBOW DRAW GOLD   URINE CULTURE, ROUTINE   CBC W/ DIFFERENTIAL             Differential diagnosis includes acute cholecystitis sepsis perforated viscus         MDM        Admission disposition: 5/26/2024  1:21 AM                                        Medical Decision Making  89-year-old female with right upper quadrant pain IV established cardiac monitor shows a sinus rhythm pulse ox shows no signs of hypoxia CBC shows a stable hemoglobin level metabolic panel stable renal function urinalysis indicative of a small UTI Rocephin ordered independent interpretation by ED physician of ultrasound shows Dilated common bile duct patient will be admitted for pain control GI consultation and further evaluation    Problems Addressed:  Choledocholithiasis: acute illness or injury  Urinary tract infection without hematuria, site unspecified: acute illness or injury    Amount and/or Complexity of Data Reviewed  Labs: ordered. Decision-making details documented in ED Course.  Radiology:  ordered and independent interpretation performed. Decision-making details documented in ED Course.  ECG/medicine tests: ordered and independent interpretation performed. Decision-making details documented in ED Course.        Disposition and Plan     Clinical Impression:  1. Urinary tract infection without hematuria, site unspecified    2. Choledocholithiasis         Disposition:  Admit  5/26/2024  1:21 am    Follow-up:  No follow-up provider specified.        Medications Prescribed:  Current Discharge Medication List                            Hospital Problems       Present on Admission  Date Reviewed: 5/26/2024            ICD-10-CM Noted POA    * (Principal) Urinary tract infection without hematuria, site unspecified N39.0 5/26/2024 Unknown

## 2024-05-26 NOTE — ED INITIAL ASSESSMENT (HPI)
Pt BIBEMS from [facility]. Abd pain starting at 1800 today after dinner, radiating to back. Took Tylenol PTA. Hx of gallstones. Denies urinary sx. LBM tonight \"after dinner.\"

## 2024-05-26 NOTE — PLAN OF CARE
Patient is A/O x4. VSS. Room air. Afebrile. Complaint of mild RUQ pain. PRN pain medication given with good relief. Ambulates with assist and a walker; ambulated in the halls and room; up in chair. Clear liquid diet; tolerated well. NPO for possible MRCP, screening completed. GI and general surgery consulted for possible ERCP.  12 Lead EKG completed per order (see chart). IVF infusing per orders. Safety precautions in place. Call light within reach.  Liquid diet resumed, patient will be NPO at midnight for MRCP tomorrow.    Problem: MUSCULOSKELETAL - ADULT  Goal: Return mobility to safest level of function  Description: INTERVENTIONS:  - Assess patient stability and activity tolerance for standing, transferring and ambulating w/ or w/o assistive devices  - Assist with transfers and ambulation using safe patient handling equipment as needed  - Ensure adequate protection for wounds/incisions during mobilization  - Obtain PT/OT consults as needed  - Advance activity as appropriate  - Communicate ordered activity level and limitations with patient/family  Outcome: Progressing     Problem: GASTROINTESTINAL - ADULT  Goal: Minimal or absence of nausea and vomiting  Description: INTERVENTIONS:  - Maintain adequate hydration with IV or PO as ordered and tolerated  - Nasogastric tube to low intermittent suction as ordered  - Evaluate effectiveness of ordered antiemetic medications  - Provide nonpharmacologic comfort measures as appropriate  - Advance diet as tolerated, if ordered  - Obtain nutritional consult as needed  - Evaluate fluid balance  Outcome: Progressing     Problem: PAIN - ADULT  Goal: Verbalizes/displays adequate comfort level or patient's stated pain goal  Description: INTERVENTIONS:  - Encourage pt to monitor pain and request assistance  - Assess pain using appropriate pain scale  - Administer analgesics based on type and severity of pain and evaluate response  - Implement non-pharmacological measures as  appropriate and evaluate response  - Consider cultural and social influences on pain and pain management  - Manage/alleviate anxiety  - Utilize distraction and/or relaxation techniques  - Monitor for opioid side effects  - Notify MD/LIP if interventions unsuccessful or patient reports new pain  - Anticipate increased pain with activity and pre-medicate as appropriate  Outcome: Progressing

## 2024-05-26 NOTE — CM/SW NOTE
05/26/24 1500   CM/SW Referral Data   Referral Source Social Work (self-referral)   Reason for Referral Discharge planning   Informant EMR;Clinical Staff Member   Patient Info   Patient's Current Mental Status at Time of Assessment Alert   Patient's Home Environment Assisted Living   Post Acute Care Provider Upon Admission [facility]   Discharge Needs   Anticipated D/C needs No anticipated discharge needs       HOME SITUATION  Type of Home: Assisted living facility ([facility])   Home Layout: One level     Lives With: Staff 24 hours     Prior Level of [place]: Pt reports ambulating with RW at [facility].  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    Chart reviewed for discharge planning needs. Per PT eval, pt ambulated 450 feet. PT is not recommending any services at discharge.     BRIAN spoke with Alma Ortegaille: [phone], spoke with RN Eloina, confirmed plan is to return once medically stable for discharge. Per notes, GI and Gen Surg is consulted.     Await discharge clearance.     RN to call report at dc: [phone]     &  to remain available and supportive for discharge planning needs.    Denise Paris, MSW, LSW  Discharge Planner  [id number]

## 2024-05-26 NOTE — ED QUICK NOTES
Orders for admission, patient is aware of plan and ready to go upstairs. Any questions, please call ED RN Ruba at extension [phone].     Patient Covid vaccination status: Unvaccinated     COVID Test Ordered in ED: None    COVID Suspicion at Admission: N/A    Running Infusions:    sodium chloride Stopped (05/26/24 0117)    None    Mental Status/LOC at time of transport: A&O x3    Other pertinent information:   CIWA score: N/A   NIH score:  N/A

## 2024-05-26 NOTE — PROGRESS NOTES
[facility]   part of [facility]     Hospitalist Progress Note     Noemy Barrios Patient Status:  Inpatient    1931 MRN [id number]   Location [facility] 4NW-A Attending Zack Lara*   Hosp Day # 0 PCP Andrew Montilla MD     Chief Complaint: Abdominal pain    Subjective:     Patient seen examined at bedside.  Patient says she is hungry and feels that she is having some right upper quadrant pain due to not eating.    Objective:    Review of Systems:   A comprehensive review of systems was completed; pertinent positive and negatives stated in subjective.    Vital signs:  Temp:  [97.2 °F (36.2 °C)-97.9 °F (36.6 °C)] 97.9 °F (36.6 °C)  Pulse:  [65-88] 81  Resp:  [14-25] 14  BP: (120-184)/(72-96) 139/75  SpO2:  [92 %-97 %] 92 %    Physical Exam:    General: No acute distress  Respiratory: No wheezes, no rhonchi  Cardiovascular: S1, S2, regular rate and rhythm  Abdomen: Soft, Non-tender, non-distended, positive bowel sounds  Neuro: No new focal deficits.   Extremities: No edema      Diagnostic Data:    Labs:  Recent Labs   Lab 24  2233   WBC 8.1   HGB 13.7   MCV 91.7   .0       Recent Labs   Lab 24  2233   *   BUN 10   CREATSERUM 0.75   CA 9.0   ALB 3.5   *   K 4.0      CO2 23.0   ALKPHO 67   AST 22   ALT 14   BILT 0.3   TP 7.3       CrCl cannot be calculated (Unknown ideal weight.).    No results for input(s): \"TROP\", \"TROPHS\", \"CK\" in the last 168 hours.    No results for input(s): \"PTP\", \"INR\" in the last 168 hours.               Microbiology    No results found for this visit on 24.      Imaging: Reviewed in Epic.    Medications:    levothyroxine  50 mcg Oral Before breakfast    venlafaxine ER  37.5 mg Oral Daily    losartan  50 mg Oral Daily    [START ON 2024] cefTRIAXone  1 g Intravenous Q24H       Assessment & Plan:      #Bilary colic  US with distended GB w/o cholecystitis and choledocholithiasis  Pain control  ECG today  Pt is  a moderate risk for a low risk procedure for a cardiac event based on age and grade 1 diastolic dysfunction.     #Abnl U/A, possible cystitis  ABX pending UCX     #HTN  Cont. ARB     #Anxiety/depression  Cont home meds     #Hypothyroid  Synthroid.      Zack Lara,     Supplementary Documentation:     Quality:  DVT Mechanical Prophylaxis:     Early ambuation  DVT Pharmacologic Prophylaxis   Medication   None                Code Status: DNAR/Selective Treatment  Sargent: No urinary catheter in place  Sargent Duration (in days):   Central line:    EUNICE:     Discharge is dependent on: clinical improvement  At this point Ms. Barrios is expected to be discharge to: Home    The 21st Century Cures Act makes medical notes like these available to patients in the interest of transparency. Please be advised this is a medical document. Medical documents are intended to carry relevant information, facts as evident, and the clinical opinion of the practitioner. The medical note is intended as peer to peer communication and may appear blunt or direct. It is written in medical language and may contain abbreviations or verbiage that are unfamiliar.             **Certification      PHYSICIAN Certification of Need for Inpatient Hospitalization - Initial Certification    Patient will require inpatient services that will reasonably be expected to span two midnight's based on the clinical documentation in H+P.   Based on patients current state of illness, I anticipate that, after discharge, patient will require TBD.

## 2024-05-26 NOTE — H&P
[facility]     Noemy [facility] Patient Status:  Emergency    1931 MRN [id number]   Location [facility] EMERGENCY DEPARTMENT Attending Juancho Steele MD   Hosp Day # 0 PCP Andrew Montilla MD     Chief Complaint:   Chief Complaint   Patient presents with    Abdomen/Flank Pain     Started at 1800 tonight. Took Tylenol PTA. Hx of gallstones.       Subjective:    History of Present Illness:     Noemy Barrios is a 89 year old female with a past medical history of anxiety and depression and hypertension.  She comes to the emergency room now secondary to abdominal pain.  Pain is located in the right upper quadrant.  Denies any fevers, chills or vomiting.  In the emergency room she had an abdominal ultrasound that shows choledocholithiasis.    History/Other:    Past Medical History:  Past Medical History:    Depression    Hearing impairment    Psychiatric disorder    ANXIETY    Thyroid disease    Unspecified essential hypertension    Visual impairment     Past Surgical History:   Past Surgical History:   Procedure Laterality Date    Colonoscopy        Family History:   History reviewed. No pertinent family history.  Social History:    reports that she has never smoked. She has never used smokeless tobacco. She reports current alcohol use.     Allergies: No Known Allergies    Medications:    No current facility-administered medications on file prior to encounter.     Current Outpatient Medications on File Prior to Encounter   Medication Sig Dispense Refill    ALPRAZolam 0.25 MG Oral Tab Take 1 tablet (0.25 mg total) by mouth 2 (two) times daily as needed for Anxiety.      denosumab 60 MG/ML Subcutaneous Solution Prefilled Syringe Inject 1 mL (60 mg total) into the skin one time.      valsartan 80 MG Oral Tab Take 1 tablet (80 mg total) by mouth daily.      Venlafaxine HCl ER 75 MG Oral Tablet 24 Hr Take 0.5 tablets (37.5 mg total) by mouth daily.      Levothyroxine  Sodium (SYNTHROID, LEVOTHROID) 75 MCG Oral Tab Take 50 mcg by mouth before breakfast.      Cholecalciferol (VITAMIN D3) 250 MCG (71401 UT) Oral Cap Take by mouth. (Patient not taking: Reported on 5/25/2024)      metoprolol Tartrate (LOPRESSOR) 25 MG Oral Tab Take 0.5 tablets by mouth 2 (two) times daily. (Patient not taking: Reported on 5/25/2024) 30 tablet 11    Vitamin B-12 (VITAMIN B12) 500 MCG Oral Tab Take 500 mcg by mouth daily. (Patient not taking: Reported on 5/25/2024)      aspirin 81 MG Oral Tab Take 81 mg by mouth daily. Taken 3 times a week - Monday Wednesday and Friday (Patient not taking: Reported on 5/25/2024)         Review of Systems:   A comprehensive review of systems was completed.    Pertinent positives and negatives noted in the HPI.    Objective:   Physical Exam:    /75   Pulse 88   Temp 97.2 °F (36.2 °C) (Temporal)   Resp 18   SpO2 95%   General: No acute distress, Alert  Respiratory: No rhonchi, no wheezes  Cardiovascular: S1, S2.   Abdomen: Soft, Non-tender, Non-distended, Positive bowel sounds  Neuro: No new focal deficits  Extremities: No edema      Results:    Labs:      Labs Last 24 Hours:  Recent Labs   Lab 05/25/24  2233   WBC 8.1   HGB 13.7   MCV 91.7   .0       Recent Labs   Lab 05/25/24  2233   *   BUN 10   CREATSERUM 0.75   CA 9.0   ALB 3.5   *   K 4.0      CO2 23.0   ALKPHO 67   AST 22   ALT 14   BILT 0.3   TP 7.3       CrCl cannot be calculated (Unknown ideal weight.).    No results for input(s): \"TROP\", \"TROPHS\", \"CK\" in the last 168 hours.    No results for input(s): \"PTP\", \"INR\" in the last 168 hours.    No results for input(s): \"TROP\", \"CK\" in the last 168 hours.      Imaging: Imaging data reviewed in Epic.    Assessment & Plan:      #Biliary colic  US with distended GB w/o cholecystitis and choledocholithiasis  Eval for ERCP  Pain control    #Abnl U/A, possible cystitis  ABX pending UCX    #HTN  Cont. ARB    #Anxiety/depression  Cont home  meds    #Hypothyroid  synthroid    #Advance care planning  Voluntary meeting  16 minutes spent face-to-face with the patient.  ACP discussed, explained and reviewed with them in detail.  The patient is a DNAR/S.  CODE STATUS updated in system.    #PPX  Start after procedures       Plan of care discussed with ED physician    Christophe Mauro MD    Supplementary Documentation:     The 21st Century Cures Act makes medical notes like these available to patients in the interest of transparency. Please be advised this is a medical document. Medical documents are intended to carry relevant information, facts as evident, and the clinical opinion of the practitioner. The medical note is intended as peer to peer communication and may appear blunt or direct. It is written in medical language and may contain abbreviations or verbiage that are unfamiliar.

## 2024-05-26 NOTE — PHYSICAL THERAPY NOTE
PHYSICAL THERAPY EVALUATION - INPATIENT     Room Number: 430/430-A  Evaluation Date: 2024  Type of Evaluation: Initial  Physician Order: PT Eval and Treat    Presenting Problem: UTI     Reason for Therapy: Mobility Dysfunction and Discharge Planning    PHYSICAL THERAPY ASSESSMENT   Patient is a 89 year old female admitted 2024 for UTI.   Patient is currently functioning near baseline with bed mobility, transfers, gait, and stair negotiation. Prior to admission, patient's baseline is mod ind.       PLAN  Patient currently does not meet criteria for skilled inpatient physical therapy services, however patient will remain on Inpatient Mobility Team and will continue with encouraged ambulation to maintain current level of mobility.   The rehab aide will perform treatment activities prescribed by this physical therapist. The rehab aide will communicate with overseeing PT regarding any change in functional mobility. RN aware.       GOALS  Patient was able to achieve the following goals ...    Patient was able to transfer At previous, functional level   Patient able to ambulate on level surfaces At previous, functional level         HOME SITUATION  Type of Home: Assisted living facility ([facility])   Home Layout: One level                Lives With: Staff 24 hours             Prior Level of [place]: Pt reports ambulating with RW at [facility].  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    SUBJECTIVE  \"I make it my goal to be nice to everyone...\"      OBJECTIVE          WEIGHT BEARING RESTRICTION                   PAIN ASSESSMENT  Ratin          COGNITION  Overall Cognitive Status:  WFL - within functional limits    RANGE OF MOTION AND STRENGTH ASSESSMENT  Upper extremity ROM and strength are within functional limits     Lower extremity ROM is within functional limits     Lower extremity strength is within functional limits       BALANCE  Static Sitting: Good  Dynamic Sitting: Good  Static  Standing: Fair -  Dynamic Standing: Fair -    ADDITIONAL TESTS                                    ACTIVITY TOLERANCE                         O2 WALK       NEUROLOGICAL FINDINGS                        AM-PAC '6-Clicks' INPATIENT SHORT FORM - BASIC MOBILITY  How much difficulty does the patient currently have...  Patient Difficulty: Turning over in bed (including adjusting bedclothes, sheets and blankets)?: None   Patient Difficulty: Sitting down on and standing up from a chair with arms (e.g., wheelchair, bedside commode, etc.): None   Patient Difficulty: Moving from lying on back to sitting on the side of the bed?: None   How much help from another person does the patient currently need...   Help from Another: Moving to and from a bed to a chair (including a wheelchair)?: A Little   Help from Another: Need to walk in hospital room?: A Little   Help from Another: Climbing 3-5 steps with a railing?: A Little       AM-PAC Score:  Raw Score: 21   Approx Degree of Impairment: 28.97%   Standardized Score (AM-PAC Scale): 50.25   CMS Modifier (G-Code): CJ    FUNCTIONAL ABILITY STATUS  Gait Assessment   Functional Mobility/Gait Assessment  Gait Assistance: Contact guard assist  Distance (ft): 450  Assistive Device: Rolling walker  Pattern: Within Functional Limits    Skilled Therapy Provided     Bed Mobility:  Rolling: ind  Supine to sit: ind   Sit to supine: NT     Transfer Mobility:  Sit to stand: supervision   Stand to sit: supervision  Gait = Ambulation as above    Therapist's comments:Pt encouraged to be OOB to chair, to ambulate with staff assist.    Exercise/Education Provided:  Bed mobility  Body mechanics  Functional activity tolerated  Gait training  Posture  Transfer training    Patient End of Session: Up in chair;Needs met;Call light within reach;RN aware of session/findings;All patient questions and concerns addressed;Alarm set;Discussed recommendations with /    Patient Evaluation  Complexity Level:  History Low - no personal factors and/or co-morbidities   Examination of body systems Low - addressing 1-2 elements   Clinical Presentation Low - Stable   Clinical Decision Making Low Complexity       PT Session Time: 25 minutes    Therapeutic Activity: 10 minutes

## 2024-05-27 ENCOUNTER — APPOINTMENT (OUTPATIENT)
Dept: MRI IMAGING | Facility: HOSPITAL | Age: 89
DRG: 418 | End: 2024-05-27
Attending: INTERNAL MEDICINE
Payer: MEDICARE

## 2024-05-27 LAB
ALBUMIN SERPL-MCNC: 3.2 G/DL (ref 3.4–5)
ALP LIVER SERPL-CCNC: 46 U/L
ALT SERPL-CCNC: 11 U/L
ANION GAP SERPL CALC-SCNC: 5 MMOL/L (ref 0–18)
AST SERPL-CCNC: 21 U/L (ref 15–37)
BASOPHILS # BLD AUTO: 0.05 X10(3) UL (ref 0–0.2)
BASOPHILS NFR BLD AUTO: 0.8 %
BILIRUB DIRECT SERPL-MCNC: <0.1 MG/DL (ref 0–0.2)
BILIRUB SERPL-MCNC: 0.3 MG/DL (ref 0.1–2)
BUN BLD-MCNC: 2 MG/DL (ref 9–23)
CALCIUM BLD-MCNC: 8.7 MG/DL (ref 8.5–10.1)
CHLORIDE SERPL-SCNC: 108 MMOL/L (ref 98–112)
CO2 SERPL-SCNC: 26 MMOL/L (ref 21–32)
CREAT BLD-MCNC: 0.67 MG/DL
EGFRCR SERPLBLD CKD-EPI 2021: 82 ML/MIN/1.73M2 (ref 60–?)
EOSINOPHIL # BLD AUTO: 0.26 X10(3) UL (ref 0–0.7)
EOSINOPHIL NFR BLD AUTO: 4 %
ERYTHROCYTE [DISTWIDTH] IN BLOOD BY AUTOMATED COUNT: 12.9 %
GLUCOSE BLD-MCNC: 120 MG/DL (ref 70–99)
HCT VFR BLD AUTO: 37.3 %
HGB BLD-MCNC: 12.2 G/DL
IMM GRANULOCYTES # BLD AUTO: 0.02 X10(3) UL (ref 0–1)
IMM GRANULOCYTES NFR BLD: 0.3 %
LYMPHOCYTES # BLD AUTO: 1.28 X10(3) UL (ref 1–4)
LYMPHOCYTES NFR BLD AUTO: 19.7 %
MAGNESIUM SERPL-MCNC: 2 MG/DL (ref 1.6–2.6)
MCH RBC QN AUTO: 30.9 PG (ref 26–34)
MCHC RBC AUTO-ENTMCNC: 32.7 G/DL (ref 31–37)
MCV RBC AUTO: 94.4 FL
MONOCYTES # BLD AUTO: 0.62 X10(3) UL (ref 0.1–1)
MONOCYTES NFR BLD AUTO: 9.5 %
NEUTROPHILS # BLD AUTO: 4.27 X10 (3) UL (ref 1.5–7.7)
NEUTROPHILS # BLD AUTO: 4.27 X10(3) UL (ref 1.5–7.7)
NEUTROPHILS NFR BLD AUTO: 65.7 %
OSMOLALITY SERPL CALC.SUM OF ELEC: 285 MOSM/KG (ref 275–295)
PLATELET # BLD AUTO: 232 10(3)UL (ref 150–450)
POTASSIUM SERPL-SCNC: 4.2 MMOL/L (ref 3.5–5.1)
PROT SERPL-MCNC: 6.6 G/DL (ref 6.4–8.2)
RBC # BLD AUTO: 3.95 X10(6)UL
SODIUM SERPL-SCNC: 139 MMOL/L (ref 136–145)
WBC # BLD AUTO: 6.5 X10(3) UL (ref 4–11)

## 2024-05-27 PROCEDURE — 74181 MRI ABDOMEN W/O CONTRAST: CPT | Performed by: INTERNAL MEDICINE

## 2024-05-27 PROCEDURE — 99232 SBSQ HOSP IP/OBS MODERATE 35: CPT | Performed by: HOSPITALIST

## 2024-05-27 PROCEDURE — 99232 SBSQ HOSP IP/OBS MODERATE 35: CPT | Performed by: SURGERY

## 2024-05-27 PROCEDURE — 76376 3D RENDER W/INTRP POSTPROCES: CPT | Performed by: INTERNAL MEDICINE

## 2024-05-27 RX ORDER — OXYCODONE HYDROCHLORIDE 5 MG/1
10 TABLET ORAL EVERY 4 HOURS PRN
Status: DISCONTINUED | OUTPATIENT
Start: 2024-05-27 | End: 2024-05-31

## 2024-05-27 RX ORDER — OXYCODONE HYDROCHLORIDE 5 MG/1
2.5 TABLET ORAL EVERY 4 HOURS PRN
Status: DISCONTINUED | OUTPATIENT
Start: 2024-05-27 | End: 2024-05-31

## 2024-05-27 RX ORDER — ENOXAPARIN SODIUM 100 MG/ML
40 INJECTION SUBCUTANEOUS DAILY
Status: DISCONTINUED | OUTPATIENT
Start: 2024-05-27 | End: 2024-05-31

## 2024-05-27 RX ORDER — HYDRALAZINE HYDROCHLORIDE 20 MG/ML
10 INJECTION INTRAMUSCULAR; INTRAVENOUS ONCE
Status: COMPLETED | OUTPATIENT
Start: 2024-05-27 | End: 2024-05-27

## 2024-05-27 RX ORDER — OXYCODONE HYDROCHLORIDE 5 MG/1
5 TABLET ORAL EVERY 4 HOURS PRN
Status: DISCONTINUED | OUTPATIENT
Start: 2024-05-27 | End: 2024-05-31

## 2024-05-27 NOTE — PROGRESS NOTES
Inpatient Follow up Note    Noemy Barrios Patient Status:  Inpatient    1931 MRN [id number]   Location [facility] 4NW-A Attending Ebony Gil MD   Hosp Day # 1 PCP Andrew Montilla MD     Reason for Consultation   Abdominal pain     Subjective     -No new complaints, denies pain unless palpated  -Feels hungry wants to eat           Objective:     BP (!) 178/94 (BP Location: Right arm)   Pulse 93   Temp 97.5 °F (36.4 °C) (Oral)   Resp 18   Wt 143 lb 4.8 oz (65 kg)   SpO2 96%   BMI 24.60 kg/m²   Gen: AAOx2  CV: RRR with normal S1 / S2  Resp: CTA bilaterally  Abd: (+)BS, soft, markedly tender in epigastrium, right upper quadrant, non-distended; no rebound or guarding  Ext: No edema or cyanosis  Skin: Warm and dry     Labs/Imaging     LABRCNTIP[PGLU:5@  No results for input(s): \"INR\" in the last 168 hours.      Recent Labs   Lab 24  1353 24  0540   WBC 8.1 8.3 6.5   HGB 13.7 13.6 12.2   .0 264.0 232.0       Recent Labs   Lab 24  1353 24  0540   * 137 139   K 4.0 3.9 4.2    106 108   CO2 23.0 25.0 26.0   BUN 10 5* 2*   CREATSERUM 0.75 0.68 0.67       Recent Labs   Lab 24  1353 24  0540   ALT 14 12* 11*   AST 22 19 21     MRI ABDOMEN AND MRCP W/3D (CPT=74181/15737)    Result Date: 2024  CONCLUSION:  1. Cholelithiasis.  No evidence of choledocholithiasis.  The common bile duct is dilated to approximately 1.1 cm, of indeterminate etiology.  No mass or luminal filling defect identified on this noncontrast study.  Consider further evaluation with ERCP, as clinically indicated.   LOCATION:  [facility]   Dictated by (CST): Deangelo Horowitz MD on 2024 at 12:24 PM     Finalized by (CST): Deangelo Horowitz MD on 2024 at 12:30 PM       US GALLBLADDER (CPT=76705)    Result Date: 2024  CONCLUSION:  1. Dilatation of the common duct measuring up to 1.1 cm is noted.  Questionable debris  versus a stone in the distal aspect of duct may represent choledocholithiasis.  This may be further evaluated with ERCP.  Cholelithiasis is noted without evidence of cholecystitis.   LOCATION:  [facility]    Dictated by (CST): Deangelo Purcell MD on 5/26/2024 at 10:45 AM     Finalized by (CST): Deangelo Purcell MD on 5/26/2024 at 10:46 AM      AST   Date/Time Value Ref Range Status   05/27/2024 05:40 AM 21 15 - 37 U/L Final   07/01/2014 11:30 AM 17 15 - 41 U/L Final     ALT   Date/Time Value Ref Range Status   05/27/2024 05:40 AM 11 (L) 13 - 56 U/L Final   07/01/2014 11:30 AM 15 14 - 54 U/L Final     BUN   Date/Time Value Ref Range Status   05/27/2024 05:40 AM 2 (L) 9 - 23 mg/dL Final   07/02/2014 04:56 AM 9 8 - 20 mg/dL Final              Assessment      89-year-old female with a history of cholelithiasis, choledocholithiasis, biliary stenting at [facility] in 2019, presenting for evaluation of right upper quadrant abdominal pain.  -LFTs are normal, ultrasound suggests cholelithiasis and choledocholithiasis  - Prior CT imaging from 2020 with question of possible soft tissue mass adjacent to gallbladder  -MRCP here suggestive of Cholelithiasis, with large stone in the fundus of gallbladder.  No clear common bile duct stones, and LFTs normal. Patient has a h/o sphincterotomy and stent placement in 2019 at [facility]   2. UTI + For E.coli           Plan     Will defer decision for empiric cholecystectomy versus HIDA scan to general surgery  2.  No plans for ERCP at this time, will follow peripherally, please call with questions       Jose Miranda MD  1:14 PM  5/27/2024  [facility]  [phone]

## 2024-05-27 NOTE — PLAN OF CARE
Pt received A&Ox4. VSS. Afebrile. Denies pain. IV rocephin infused. Up ambulating in room with assistance. Pt NPO for MRCP today 5/27. Fall precautions in place. Call light within reach.    Problem: SKIN/TISSUE INTEGRITY - ADULT  Goal: Skin integrity remains intact  Description: INTERVENTIONS  - Assess and document risk factors for pressure ulcer development  - Assess and document skin integrity  - Monitor for areas of redness and/or skin breakdown  - Initiate interventions, skin care algorithm/standards of care as needed  Outcome: Progressing     Problem: MUSCULOSKELETAL - ADULT  Goal: Return mobility to safest level of function  Description: INTERVENTIONS:  - Assess patient stability and activity tolerance for standing, transferring and ambulating w/ or w/o assistive devices  - Assist with transfers and ambulation using safe patient handling equipment as needed  - Ensure adequate protection for wounds/incisions during mobilization  - Obtain PT/OT consults as needed  - Advance activity as appropriate  - Communicate ordered activity level and limitations with patient/family  Outcome: Progressing     Problem: PAIN - ADULT  Goal: Verbalizes/displays adequate comfort level or patient's stated pain goal  Description: INTERVENTIONS:  - Encourage pt to monitor pain and request assistance  - Assess pain using appropriate pain scale  - Administer analgesics based on type and severity of pain and evaluate response  - Implement non-pharmacological measures as appropriate and evaluate response  - Consider cultural and social influences on pain and pain management  - Manage/alleviate anxiety  - Utilize distraction and/or relaxation techniques  - Monitor for opioid side effects  - Notify MD/LIP if interventions unsuccessful or patient reports new pain  - Anticipate increased pain with activity and pre-medicate as appropriate  Outcome: Progressing     Problem: GASTROINTESTINAL - ADULT  Goal: Minimal or absence of nausea and  vomiting  Description: INTERVENTIONS:  - Maintain adequate hydration with IV or PO as ordered and tolerated  - Nasogastric tube to low intermittent suction as ordered  - Evaluate effectiveness of ordered antiemetic medications  - Provide nonpharmacologic comfort measures as appropriate  - Advance diet as tolerated, if ordered  - Obtain nutritional consult as needed  - Evaluate fluid balance  Outcome: Progressing

## 2024-05-27 NOTE — PROGRESS NOTES
[facility]  Progress Note    Noemy Barrios Patient Status:  Inpatient    1931 MRN [id number]   Location [facility] 4NW-A Attending Ebony Gil MD   Hosp Day # 1 PCP Andrew Montilla MD     Subjective:  Patient was seen and examined at bedside.  The patient states she is feeling \"terrible\" today.  She states she has pain all over.  She specifically notes pain in her back.    MRCP revealed cholelithiasis without evidence of choledocholithiasis.  The common bile duct is dilated to approximately 1.1 cm.    Objective/Physical Exam:  BP (!) 178/94 (BP Location: Right arm)   Pulse 93   Temp 97.5 °F (36.4 °C) (Oral)   Resp 18   Wt 143 lb 4.8 oz (65 kg)   SpO2 96%   BMI 24.60 kg/m²     Intake/Output Summary (Last 24 hours) at 2024 1400  Last data filed at 2024 0954  Gross per 24 hour   Intake 3106 ml   Output 300 ml   Net 2806 ml         General: Alert, oriented x3. No acute distress.  HEENT: Normocephalic, atraumatic. No scleral icterus.  Pulmonary: No respiratory distress, effort normal.   Abdomen: Non-distended, without tympany to percussion. Soft, non-tender to palpation. No rebound or guarding. No peritoneal signs.   Extremities: No lower extremity edema. No clubbing or cyanosis.   Skin: Warm, dry. No jaundice.       Labs:  Lab Results   Component Value Date    WBC 6.5 2024    HGB 12.2 2024    HCT 37.3 2024    .0 2024      Lab Results   Component Value Date    PT 14.8 (H) 2014    INR 1.20 (H) 2014     Lab Results   Component Value Date     2024    K 4.2 2024     2024    CO2 26.0 2024    BUN 2 2024    CREATSERUM 0.67 2024     2024    CA 8.7 2024    ALKPHO 46 2024    ALT 11 2024    AST 21 2024    BILT 0.3 2024    ALB 3.2 2024    TP 6.6 2024          Assessment:  Patient Active Problem List   Diagnosis    Chest pain    Dyspnea    GERD  (gastroesophageal reflux disease)    HTN (hypertension)    Urinary tract infection without hematuria, site unspecified    ACP (advance care planning)    Biliary colic    Choledocholithiasis     Cholelithiasis    Plan:  We will order a HIDA scan for further evaluation of the patient's gallbladder  N.p.o. for HIDA.  The patient may start a clear liquid diet after imaging  Antiemetics and analgesics as needed  Encourage ambulation and up to chair-OT on consult  DVT prophylaxis with lovenox  GI prophylaxis with protonix       The patient was discussed with Dr. Rodriguez , and he is in agreement with the assessment and plan. My total face time with this patient was 25 minutes. Greater than half of the visit was spent in counseling the patient on the above listed diagnoses and treatment options.     Janette Peter PA-C  5/27/2024  2:00 PM    Addendum:    I have seen and examined the patient; I obtained and performed the above history, physical examination, assessment and plan.  I have I have edited the above to reflect my evaluation, opinion, and physical exam findings.    Exam and plan as above.    MRCP findings noted.  Patient with ongoing complaints of discomfort therefore a HIDA scan ordered.  Care plan discussed with patient.  Consult discussed with Dr. Miranda.  Further recommendations after HIDA scan complete and results finalized.    I, Dr. Steve Rodriguez, personally performed the services described in this documentation  by Ms Peter, and they are both accurate and complete.    Steve Rodriguez MD FACS  [facility]

## 2024-05-27 NOTE — PLAN OF CARE
Patient is alert, IVF per MAR. Patient assisted to chair and bathroom. On-call MRI technician paged regarding MRI/MRCP. Patient taken down to scan on bed and back. Patient c/o generalized pain - PRN per MAR. Nuclear medicine paged with new order, RN received call stating scan will be around 1830, patient not to have narcotics or food 6 hours prior. /91 - MD Jeffery notified, orders received. BP recheck 139/64. Call light within reach.     Approximately 1740: Patient reports severe generalized pain after MRI, states she cannot lay still for two hours for NM  scan and is requesting PRN morphine, NM notified patient declining scan at this time.

## 2024-05-27 NOTE — PROGRESS NOTES
[facility]   part of [facility]     Hospitalist Progress Note     Noemy Barrios Patient Status:  Inpatient    1931 MRN [id number]   Location [facility] 4NW-A Attending Zack Lara*   Hosp Day # 1 PCP Andrew Montilla MD     Chief Complaint: Abdominal pain    Subjective:     No pain    Objective:    Review of Systems:   A comprehensive review of systems was completed; pertinent positive and negatives stated in subjective.    Vital signs:  Temp:  [97.4 °F (36.3 °C)-97.9 °F (36.6 °C)] 97.4 °F (36.3 °C)  Pulse:  [73-90] 73  Resp:  [14-16] 16  BP: (139-160)/(51-82) 143/51  SpO2:  [92 %-96 %] 96 %    Physical Exam:    General: No acute distress  Respiratory: No wheezes, no rhonchi  Cardiovascular: S1, S2, regular rate and rhythm  Abdomen: Soft, Non-tender, non-distended, positive bowel sounds  Neuro: No new focal deficits.   Extremities: No edema      Diagnostic Data:    Labs:  Recent Labs   Lab 24  1353 24  0540   WBC 8.1 8.3 6.5   HGB 13.7 13.6 12.2   MCV 91.7 92.7 94.4   .0 264.0 232.0       Recent Labs   Lab 24  1353 24  0540   * 143* 120*   BUN 10 5* 2*   CREATSERUM 0.75 0.68 0.67   CA 9.0 9.2 8.7   ALB 3.5 3.7 3.2*   * 137 139   K 4.0 3.9 4.2    106 108   CO2 23.0 25.0 26.0   ALKPHO 67 56 46*   AST 22 19 21   ALT 14 12* 11*   BILT 0.3 0.4 0.3   TP 7.3 7.6 6.6       CrCl cannot be calculated (Unknown ideal weight.).    No results for input(s): \"TROP\", \"TROPHS\", \"CK\" in the last 168 hours.    No results for input(s): \"PTP\", \"INR\" in the last 168 hours.               Microbiology    Hospital Encounter on 24   1. Urine Culture, Routine     Status: Abnormal (Preliminary result)    Collection Time: 24 12:50 AM    Specimen: Urine, clean catch   Result Value Ref Range    Urine Culture >100,000 CFU/ML Gram Negative Darron (A) N/A         Imaging: Reviewed in Epic.    Medications:    levothyroxine   50 mcg Oral Before breakfast    venlafaxine ER  37.5 mg Oral Daily    losartan  50 mg Oral Daily    cefTRIAXone  1 g Intravenous Q24H       Assessment & Plan:      #Bilary colic  -US with distended GB w/o cholecystitis and choledocholithiasis  -Pain control  -MRI/ MRCP pending  -Pt is a moderate risk for a low risk procedure for a cardiac event based on age and grade 1 diastolic dysfunction.     #UTI- GNR  -await ID and sensitivity  -continue Rocephin     #HTN  -Cont. ARB     #Anxiety/depression  -Cont home meds     #Hypothyroid  -Synthroid      Ebony Gil M.D.  [facility]      Supplementary Documentation:     Quality:  DVT Mechanical Prophylaxis:     Early ambuation  DVT Pharmacologic Prophylaxis   Medication   None                Code Status: DNAR/Selective Treatment  Sargent: No urinary catheter in place  Sargent Duration (in days):   Central line:    EUNICE:     Discharge is dependent on: clinical improvement  At this point Ms. Barrios is expected to be discharge to: Home    The 21st Century Cures Act makes medical notes like these available to patients in the interest of transparency. Please be advised this is a medical document. Medical documents are intended to carry relevant information, facts as evident, and the clinical opinion of the practitioner. The medical note is intended as peer to peer communication and may appear blunt or direct. It is written in medical language and may contain abbreviations or verbiage that are unfamiliar.             **Certification      PHYSICIAN Certification of Need for Inpatient Hospitalization - Initial Certification    Patient will require inpatient services that will reasonably be expected to span two midnight's based on the clinical documentation in H+P.   Based on patients current state of illness, I anticipate that, after discharge, patient will require TBD.

## 2024-05-28 ENCOUNTER — APPOINTMENT (OUTPATIENT)
Dept: NUCLEAR MEDICINE | Facility: HOSPITAL | Age: 89
DRG: 418 | End: 2024-05-28
Attending: SURGERY
Payer: MEDICARE

## 2024-05-28 PROCEDURE — 99232 SBSQ HOSP IP/OBS MODERATE 35: CPT | Performed by: STUDENT IN AN ORGANIZED HEALTH CARE EDUCATION/TRAINING PROGRAM

## 2024-05-28 PROCEDURE — 78227 HEPATOBIL SYST IMAGE W/DRUG: CPT | Performed by: SURGERY

## 2024-05-28 PROCEDURE — 99232 SBSQ HOSP IP/OBS MODERATE 35: CPT | Performed by: HOSPITALIST

## 2024-05-28 NOTE — CM/SW NOTE
Order for POLST- [place] faxed POLST to sw-copy on chart.    ELVIA StinsonW  /Discharge Planner

## 2024-05-28 NOTE — PROGRESS NOTES
[facility]   part of [facility]     Hospitalist Progress Note     Noemy Barrios Patient Status:  Inpatient    1931 MRN [id number]   Location [facility] 4NW-A Attending Zack Lara*   Hosp Day # 2 PCP Andrew Montilla MD     Chief Complaint: Abdominal pain    Subjective:     No pain, nausea, diarrhea    Objective:    Review of Systems:   A comprehensive review of systems was completed; pertinent positive and negatives stated in subjective.    Vital signs:  Temp:  [97.6 °F (36.4 °C)-98.5 °F (36.9 °C)] 98.5 °F (36.9 °C)  Pulse:  [] 92  Resp:  [16] 16  BP: (120-184)/(64-91) 154/66  SpO2:  [94 %-95 %] 95 %    Physical Exam:    General: No acute distress  Respiratory: No wheezes, no rhonchi  Cardiovascular: S1, S2, regular rate and rhythm  Abdomen: Soft, Non-tender, non-distended, positive bowel sounds  Neuro: No new focal deficits.   Extremities: No edema      Diagnostic Data:    Labs:  Recent Labs   Lab 24  1353 24  0540   WBC 8.1 8.3 6.5   HGB 13.7 13.6 12.2   MCV 91.7 92.7 94.4   .0 264.0 232.0       Recent Labs   Lab 24  1353 24  0540   * 143* 120*   BUN 10 5* 2*   CREATSERUM 0.75 0.68 0.67   CA 9.0 9.2 8.7   ALB 3.5 3.7 3.2*   * 137 139   K 4.0 3.9 4.2    106 108   CO2 23.0 25.0 26.0   ALKPHO 67 56 46*   AST 22 19 21   ALT 14 12* 11*   BILT 0.3 0.4 0.3   TP 7.3 7.6 6.6       CrCl cannot be calculated (Unknown ideal weight.).    No results for input(s): \"TROP\", \"TROPHS\", \"CK\" in the last 168 hours.    No results for input(s): \"PTP\", \"INR\" in the last 168 hours.               Microbiology    Hospital Encounter on 24   1. Urine Culture, Routine     Status: Abnormal    Collection Time: 24 12:50 AM    Specimen: Urine, clean catch   Result Value Ref Range    Urine Culture >100,000 CFU/ML Enterobacter cloacae complex (A) N/A       Susceptibility    Enterobacter cloacae complex -  (no  method available)     Cefazolin >=64 Resistant      Cefepime <=1 Sensitive      Ceftriaxone <=1 Sensitive      Ciprofloxacin <=0.25 Sensitive      Gentamicin <=1 Sensitive      Meropenem <=0.25 Sensitive      Levofloxacin <=0.12 Sensitive      Nitrofurantoin 64 Intermediate      Piperacillin + Tazobactam <=4 Sensitive      Trimethoprim/Sulfa <=20 Sensitive          Imaging: Reviewed in Epic.    Medications:    pantoprazole  40 mg Intravenous Daily    enoxaparin  40 mg Subcutaneous Daily    levothyroxine  50 mcg Oral Before breakfast    venlafaxine ER  37.5 mg Oral Daily    losartan  50 mg Oral Daily    cefTRIAXone  1 g Intravenous Q24H       Assessment & Plan:      #Bilary colic  -US with distended GB w/o cholecystitis and choledocholithiasis  -Pain control  -MRI showing cholelithiasis and no choledocholithiasis  -HIDA completed, results pending  -lap christie in am  -Pt is a moderate risk for a low risk procedure for a cardiac event based on age and grade 1 diastolic dysfunction.     #UTI- enterobacter  -continue Rocephin d3     #HTN  -Cont. ARB     #Anxiety/depression  -Cont home meds     #Hypothyroid  -Synthroid      Ebony Gil M.D.  [facility]      Supplementary Documentation:     Quality:  DVT Mechanical Prophylaxis:     Early ambuation  DVT Pharmacologic Prophylaxis   Medication    enoxaparin (Lovenox) 40 MG/0.4ML SUBQ injection 40 mg                Code Status: DNAR/Selective Treatment  Sargent: No urinary catheter in place  Sargent Duration (in days):   Central line:    EUNICE:     Discharge is dependent on: clinical improvement  At this point Ms. Barrios is expected to be discharge to: Home    The 21st Century Cures Act makes medical notes like these available to patients in the interest of transparency. Please be advised this is a medical document. Medical documents are intended to carry relevant information, facts as evident, and the clinical opinion of the practitioner. The medical note is intended as  peer to peer communication and may appear blunt or direct. It is written in medical language and may contain abbreviations or verbiage that are unfamiliar.             **Certification      PHYSICIAN Certification of Need for Inpatient Hospitalization - Initial Certification    Patient will require inpatient services that will reasonably be expected to span two midnight's based on the clinical documentation in H+P.   Based on patients current state of illness, I anticipate that, after discharge, patient will require TBD.

## 2024-05-28 NOTE — OCCUPATIONAL THERAPY NOTE
Att'd to see pt this AM. Pt off the floor at this time. OT will continue to follow and re-attempt at later time.

## 2024-05-28 NOTE — PLAN OF CARE
Pt received A&Ox4. VSS. RA. Afebrile. Denies pain overnight. Medications given per MAR, receiving Rocephin. IVF. Call light within reach. Fall precautions in place. NPO @ MN.     Problem: SKIN/TISSUE INTEGRITY - ADULT  Goal: Skin integrity remains intact  Description: INTERVENTIONS  - Assess and document risk factors for pressure ulcer development  - Assess and document skin integrity  - Monitor for areas of redness and/or skin breakdown  - Initiate interventions, skin care algorithm/standards of care as needed  Outcome: Progressing     Problem: MUSCULOSKELETAL - ADULT  Goal: Return mobility to safest level of function  Description: INTERVENTIONS:  - Assess patient stability and activity tolerance for standing, transferring and ambulating w/ or w/o assistive devices  - Assist with transfers and ambulation using safe patient handling equipment as needed  - Ensure adequate protection for wounds/incisions during mobilization  - Obtain PT/OT consults as needed  - Advance activity as appropriate  - Communicate ordered activity level and limitations with patient/family  Outcome: Progressing     Problem: GASTROINTESTINAL - ADULT  Goal: Minimal or absence of nausea and vomiting  Description: INTERVENTIONS:  - Maintain adequate hydration with IV or PO as ordered and tolerated  - Nasogastric tube to low intermittent suction as ordered  - Evaluate effectiveness of ordered antiemetic medications  - Provide nonpharmacologic comfort measures as appropriate  - Advance diet as tolerated, if ordered  - Obtain nutritional consult as needed  - Evaluate fluid balance  Outcome: Progressing     Problem: PAIN - ADULT  Goal: Verbalizes/displays adequate comfort level or patient's stated pain goal  Description: INTERVENTIONS:  - Encourage pt to monitor pain and request assistance  - Assess pain using appropriate pain scale  - Administer analgesics based on type and severity of pain and evaluate response  - Implement non-pharmacological  measures as appropriate and evaluate response  - Consider cultural and social influences on pain and pain management  - Manage/alleviate anxiety  - Utilize distraction and/or relaxation techniques  - Monitor for opioid side effects  - Notify MD/LIP if interventions unsuccessful or patient reports new pain  - Anticipate increased pain with activity and pre-medicate as appropriate  Outcome: Progressing      clothing/shoes

## 2024-05-28 NOTE — PROGRESS NOTES
[facility]  General Surgery Progress Note    Noemy Barrios Patient Status:  Inpatient    1931 MRN [id number]   Location [facility] 4NW-A Attending Ebony Gil MD   Hosp Day # 2 PCP Andrew Montilla MD     Subjective:  Patient had MRCP done yesterday.  It just showed cholelithiasis, no choledocholithiasis or retained stents.  Patient still feels some abdominal pain and nausea.  She denies any other symptoms.    Objective/Physical Exam:      Intake/Output Summary (Last 24 hours) at 2024 1040  Last data filed at 2024 0835  Gross per 24 hour   Intake 2267 ml   Output --   Net 2267 ml       Vital Signs:  Blood pressure 154/66, pulse 92, temperature 98.5 °F (36.9 °C), temperature source Oral, resp. rate 16, weight 143 lb 4.8 oz (65 kg), SpO2 95%.    General: Alert, orientated x3.  Cooperative.  No apparent distress.  HEENT: Exam is unremarkable.  Without scleral icterus.  Mucous membranes are moist. Oropharynx is clear.  Neck: No JVD. Supple.   Lungs: Non labored breathing, equal chest rise  Cardiac: Regular rate and rhythm. No murmur.  Abdomen:  Soft, non-distended, non-tender, with no rebound or guarding.  No peritoneal signs.   Extremities:  No lower extremity edema noted.  Without clubbing or cyanosis.  2+ pulses x4, motor and sensation grossly intact      Labs:             Images:  MRI ABDOMEN AND MRCP W/3D (CPT=74181/22290)    Result Date: 2024  CONCLUSION:  1. Cholelithiasis.  No evidence of choledocholithiasis.  The common bile duct is dilated to approximately 1.1 cm, of indeterminate etiology.  No mass or luminal filling defect identified on this noncontrast study.  Consider further evaluation with ERCP, as clinically indicated.   LOCATION:  [place]   Dictated by (CST): Deangelo Horowitz MD on 2024 at 12:24 PM     Finalized by (CST): Deangelo Horowitz MD on 2024 at 12:30 PM        Assessment/Plan:  Patient Active Problem List   Diagnosis    Chest pain    Dyspnea    GERD  (gastroesophageal reflux disease)    HTN (hypertension)    Urinary tract infection without hematuria, site unspecified    ACP (advance care planning)    Biliary colic    Choledocholithiasis       89 year old female with choledocholithiasis    Patient continues to have abdominal pain  HIDA scan ordered for today  Okay for clears postprocedure  N.p.o. at midnight  Will plan for laparoscopic cholecystectomy with ICG guidance, possible cholangiogram tomorrow  Patient deemed moderate risk for cardiac event based on her age and grade 1 diastolic dysfunction  Surgery will continue to follow  Rest of care per primary    Emily Mae MD  [facility]  5/28/2024  10:40 AM

## 2024-05-28 NOTE — PAYOR COMM NOTE
--------------  ADMISSION REVIEW     Payor: [employer]  Subscriber #:  [id number]  Authorization Number: [id number]    Admit date: 5/26/24  Admit time:  3:05 AM       History   HPI  89-year-old female present emergency department for abdominal pain right upper quadrant.  No nausea vomiting no diarrhea no cough or cold no other exacerbating factors or associated symptoms  ED Triage Vitals [05/25/24 2228]   BP (!) 184/96   Pulse 74   Resp 25   Temp 97.2 °F (36.2 °C)   Temp src Temporal   SpO2 96 %   O2 Device None (Room air)     Labs Reviewed   COMP METABOLIC PANEL (14) - Abnormal; Notable for the following components:       Result Value    Glucose 121 (*)     Sodium 133 (*)     A/G Ratio 0.9 (*)     All other components within normal limits   URINALYSIS WITH CULTURE REFLEX - Abnormal; Notable for the following components:    Urine Color Colorless (*)     Nitrite Urine 2+ (*)     Leukocyte Esterase Urine 75 (*)     WBC Urine 11-20 (*)     Bacteria Urine Rare (*)     Squamous Epi. Cells Few (*)    Admission disposition: 5/26/2024  1:21 AM    Disposition and Plan   Clinical Impression:  1. Urinary tract infection without hematuria, site unspecified    2. Choledocholithiasis       Disposition:  Admit  5/26/2024  1:21 am    History and Physical    History of Present Illness:    Noemy Barrios is a 89 year old female with a past medical history of anxiety and depression and hypertension.  She comes to the emergency room now secondary to abdominal pain.  Pain is located in the right upper quadrant.  Denies any fevers, chills or vomiting.  In the emergency room she had an abdominal ultrasound that shows choledocholithiasis.    Assessment & Plan:       #Biliary colic  US with distended GB w/o cholecystitis and choledocholithiasis  Eval for ERCP  Pain control     #Abnl U/A, possible cystitis  ABX pending UCX     #HTN  Cont. ARB     #Anxiety/depression  Cont home meds      #Hypothyroid  Synthroid      5/26/24  emp:  [97.2 °F (36.2 °C)-97.9 °F (36.6 °C)] 97.9 °F (36.6 °C)  Pulse:  [65-88] 81  Resp:  [14-25] 14  BP: (120-184)/(72-96) 139/75  SpO2:  [92 %-97 %] 92 %     Physical Exam:    Respiratory: No wheezes, no rhonchi  Cardiovascular: S1, S2, regular rate and rhythm  Abdomen: Soft, Non-tender, non-distended, positive bowel sounds  Neuro: No new focal deficits.   Extremities: No edema  Lab 05/25/24  2233   WBC 8.1   HGB 13.7   MCV 91.7   .0      Lab 05/25/24  2233   *   BUN 10   CREATSERUM 0.75   CA 9.0   ALB 3.5   *   K 4.0      CO2 23.0   ALKPHO 67   AST 22   ALT 14   BILT 0.3   TP 7.3    Medications:    levothyroxine  50 mcg Oral Before breakfast    venlafaxine ER  37.5 mg Oral Daily    losartan  50 mg Oral Daily    [START ON 5/27/2024] cefTRIAXone  1 g Intravenous Q24H         Assessment & Plan:       #Bilary colic  US with distended GB w/o cholecystitis and choledocholithiasis  Pain control  ECG today  Pt is a moderate risk for a low risk procedure for a cardiac event based on age and grade 1 diastolic dysfunction.     #Abnl U/A, possible cystitis  ABX pending UCX     #HTN  Cont. ARB     #Anxiety/depression  Cont home meds     #Hypothyroid  Synthroid.       GASTROENTEROLOGY  MPRESSION:   1.  89-year-old female with a history of cholelithiasis, choledocholithiasis, biliary stenting at [place] in 2019, presenting for evaluation of right upper quadrant abdominal pain.  -LFTs are normal, ultrasound suggests cholelithiasis and choledocholithiasis  - Prior CT imaging from 2020 with question of possible soft tissue mass adjacent to gallbladder             PLAN:      1.  Plan MRI, MRCP  2.  Repeat CBC CMP  3.  Surgical consult-discussed with Dr. Mae  4.  Pending results, consider ERCP       5/27/24  Temp:  [97.4 °F (36.3 °C)-97.9 °F (36.6 °C)] 97.4 °F (36.3 °C)  Pulse:  [73-90] 73  Resp:  [14-16] 16  BP: (139-160)/(51-82) 143/51  SpO2:  [92 %-96 %] 96  %     Physical Exam:    Respiratory: No wheezes, no rhonchi  Cardiovascular: S1, S2, regular rate and rhythm  Abdomen: Soft, Non-tender, non-distended, positive bowel sounds  Neuro: No new focal deficits.   Extremities: No edema  Lab 05/25/24  2233 05/26/24  1353 05/27/24  0540   WBC 8.1 8.3 6.5   HGB 13.7 13.6 12.2   MCV 91.7 92.7 94.4   .0 264.0 232.0      Lab 05/25/24  2233 05/26/24  1353 05/27/24  0540   * 143* 120*   BUN 10 5* 2*   CREATSERUM 0.75 0.68 0.67   CA 9.0 9.2 8.7   ALB 3.5 3.7 3.2*   * 137 139   K 4.0 3.9 4.2    106 108   CO2 23.0 25.0 26.0   ALKPHO 67 56 46*   AST 22 19 21   ALT 14 12* 11*   BILT 0.3 0.4 0.3   TP 7.3 7.6 6.6   Microbiology           Hospital Encounter on 05/25/24   1. Urine Culture, Routine     Status: Abnormal (Preliminary result)     Collection Time: 05/26/24 12:50 AM     Specimen: Urine, clean catch   Result Value Ref Range     Urine Culture >100,000 CFU/ML Gram Negative Darron (A) N/A       Medications:    levothyroxine  50 mcg Oral Before breakfast    venlafaxine ER  37.5 mg Oral Daily    losartan  50 mg Oral Daily    cefTRIAXone  1 g Intravenous Q24H       Assessment & Plan:    #Bilary colic  -US with distended GB w/o cholecystitis and choledocholithiasis  -Pain control  -MRI/ MRCP pending  -Pt is a moderate risk for a low risk procedure for a cardiac event based on age and grade 1 diastolic dysfunction.     #UTI- GNR  -await ID and sensitivity  -continue Rocephin     #HTN  -Cont. ARB     #Anxiety/depression  -Cont home meds     #Hypothyroid  -Synthroid       GENERAL SURGERY  The patient states she is feeling \"terrible\" today.  She states she has pain all over.  She specifically notes pain in her back.  MRCP revealed cholelithiasis without evidence of choledocholithiasis.  The common bile duct is dilated to approximately 1.1 cm.    Assessment:  Cholelithiasis     Plan:  We will order a HIDA scan for further evaluation of the patient's gallbladder  N.p.o.  for HIDA.  The patient may start a clear liquid diet after imaging  Antiemetics and analgesics as needed  Encourage ambulation and up to chair-OT on consult  DVT prophylaxis with lovenox  GI prophylaxis with protonix     MEDICATIONS ADMINISTERED IN LAST 1 DAY:  acetaminophen (Tylenol Extra Strength) tab 1,000 mg       Date Action Dose Route User    5/28/2024 0834 Given 1,000 mg Oral Kira Beck RN    5/27/2024 1509 Given 1,000 mg Oral Kira Beck RN          cefTRIAXone (Rocephin) 1 g in D5W 100 mL IVPB-ADD       Date Action Dose Route User    5/28/2024 0046 New Bag 1 g Intravenous Greta Patel RN          enoxaparin (Lovenox) 40 MG/0.4ML SUBQ injection 40 mg       Date Action Dose Route User    5/27/2024 1501 Given 40 mg Subcutaneous (Right Lower Abdomen) Kira Beck RN          hydrALAzine (Apresoline) 20 mg/mL injection 10 mg       Date Action Dose Route User    5/27/2024 1548 Given 10 mg Intravenous Kira Beck RN          losartan (Cozaar) tab 50 mg       Date Action Dose Route User    5/28/2024 0833 Given 50 mg Oral Kira Beck RN          morphINE PF 4 MG/ML injection 4 mg       Date Action Dose Route User    5/27/2024 1751 Given 4 mg Intravenous Kira Beck RN          ondansetron (Zofran) 4 MG/2ML injection 4 mg       Date Action Dose Route User    5/27/2024 1654 Given 4 mg Intravenous Kira Beck RN          pantoprazole (Protonix) 40 mg in sodium chloride 0.9% PF 10 mL IV push       Date Action Dose Route User    5/28/2024 0834 Given 40 mg Intravenous Kira Beck RN    5/27/2024 1502 Given 40 mg Intravenous Kira Beck RN          venlafaxine ER (Effexor-XR) 24 hr cap 37.5 mg       Date Action Dose Route User    5/28/2024 0833 Given 37.5 mg Oral Kira Beck RN            Vitals (last day)       Date/Time Temp Pulse Resp BP SpO2 Weight O2 Device O2 Flow Rate (L/min) [facility]    05/28/24 0806 98.5 °F (36.9 °C) 92 16 154/66 95 % -- None (Room air) --     05/28/24  0325 97.6 °F (36.4 °C) 97 16 131/78 94 % -- None (Room air) --     05/27/24 1520 -- 89 -- 184/91 -- -- -- --     05/27/24 1217 97.5 °F (36.4 °C) 93 18 178/94 96 % -- None (Room air) --     05/27/24 0300 97.4 °F (36.3 °C) 73 16 143/51 96 % -- None (Room air) -- RS

## 2024-05-28 NOTE — PROGRESS NOTES
[facility]  GASTROENTEROLOGY PROGRESS NOTE   [facility]     Noemy Barrios Patient Status:  Inpatient    1931 MRN [id number]   Location [facility] 4NW-A Attending Ebony Gil MD   Hosp Day # 2 PCP Andrew Montilla MD       Reason for Consultation:   RUQ Abdominal pain    Subjective:   Denies any abd pain this am  Denies any emesis; no overt GI bleeding      Patient Active Problem List   Diagnosis    Chest pain    Dyspnea    GERD (gastroesophageal reflux disease)    HTN (hypertension)    Urinary tract infection without hematuria, site unspecified    ACP (advance care planning)    Biliary colic    Choledocholithiasis       PMH, PSH, Fhx, Shx as per initial consult note    Review of Systems    12 point Review of Systems negative unless otherwise mentioned in Subjective.     Physical Exam  /66 (BP Location: Left arm)   Pulse 92   Temp 98.5 °F (36.9 °C) (Oral)   Resp 16   Wt 143 lb 4.8 oz (65 kg)   SpO2 95%   BMI 24.60 kg/m²   Body mass index is 24.6 kg/m².      Gen: No acute distress  Resp: no respiratory distress  Abd: Soft, non-tender, non-distended. No rebound tenderness, no guarding.   Neuro: Aox3.     Diagnostic Data:      Labs:  Recent Labs   Lab 243 24  1353 24  0540   WBC 8.1 8.3 6.5   HGB 13.7 13.6 12.2   MCV 91.7 92.7 94.4   .0 264.0 232.0       Recent Labs   Lab 243 24  1353 24  0540   * 143* 120*   BUN 10 5* 2*   CREATSERUM 0.75 0.68 0.67   CA 9.0 9.2 8.7   ALB 3.5 3.7 3.2*   * 137 139   K 4.0 3.9 4.2    106 108   CO2 23.0 25.0 26.0   ALKPHO 67 56 46*   AST 22 19 21   ALT 14 12* 11*   BILT 0.3 0.4 0.3   TP 7.3 7.6 6.6       No results for input(s): \"PTP\", \"INR\" in the last 168 hours.    No data recorded        Imaging: Imaging data reviewed in Epic.    Medications:    pantoprazole  40 mg Intravenous Daily    enoxaparin  40 mg Subcutaneous Daily    levothyroxine  50 mcg Oral Before  breakfast    venlafaxine ER  37.5 mg Oral Daily    losartan  50 mg Oral Daily    cefTRIAXone  1 g Intravenous Q24H       Allergies:  No Known Allergies    Imaging:  I have personally reviewed all pertinent available imaging.       ASSESSMENT / PLAN:     Noemy Barrios is a 89 year old female with GI consult for RUQ abdominal pain, which is improved this am. MRCP most recently shows cholelithiasis, without any CBD stone (reviewed prior US - CBD stone may have passed).   MRCP Dilated CBD. Lipase wnl.     Recommendations:   General surgery on consult  Plan for HIDA scan is noted today  Evaluation of CCY per surgery team  DVT ppx  IVF, analgesia, anti-emetics per primary team    GI will follow, please page with questions       Adonay Lara MD  [facility]

## 2024-05-28 NOTE — PLAN OF CARE
Patient is alert, c/o mild pain - PRN per MAR. IVF per MAR. Patient taken down on bed to [facility]uc[facility] and back. Patient assisted to bathroom and chair for meal, tolerating clear liquid diet. Patient ambulated in hallway with staff. Update given to family members. Call light within reach.

## 2024-05-29 ENCOUNTER — ANESTHESIA (OUTPATIENT)
Dept: SURGERY | Facility: HOSPITAL | Age: 89
DRG: 418 | End: 2024-05-29
Payer: MEDICARE

## 2024-05-29 ENCOUNTER — APPOINTMENT (OUTPATIENT)
Dept: GENERAL RADIOLOGY | Facility: HOSPITAL | Age: 89
DRG: 418 | End: 2024-05-29
Attending: STUDENT IN AN ORGANIZED HEALTH CARE EDUCATION/TRAINING PROGRAM
Payer: MEDICARE

## 2024-05-29 ENCOUNTER — ANESTHESIA EVENT (OUTPATIENT)
Dept: SURGERY | Facility: HOSPITAL | Age: 89
DRG: 418 | End: 2024-05-29
Payer: MEDICARE

## 2024-05-29 LAB
ALBUMIN SERPL-MCNC: 3.3 G/DL (ref 3.4–5)
ALBUMIN/GLOB SERPL: 0.8 {RATIO} (ref 1–2)
ALP LIVER SERPL-CCNC: 53 U/L
ALT SERPL-CCNC: 11 U/L
ANION GAP SERPL CALC-SCNC: 7 MMOL/L (ref 0–18)
AST SERPL-CCNC: 19 U/L (ref 15–37)
BASOPHILS # BLD AUTO: 0.08 X10(3) UL (ref 0–0.2)
BASOPHILS NFR BLD AUTO: 1.3 %
BILIRUB SERPL-MCNC: 0.5 MG/DL (ref 0.1–2)
BUN BLD-MCNC: 3 MG/DL (ref 9–23)
CALCIUM BLD-MCNC: 8.9 MG/DL (ref 8.5–10.1)
CHLORIDE SERPL-SCNC: 108 MMOL/L (ref 98–112)
CO2 SERPL-SCNC: 25 MMOL/L (ref 21–32)
CREAT BLD-MCNC: 0.64 MG/DL
EGFRCR SERPLBLD CKD-EPI 2021: 83 ML/MIN/1.73M2 (ref 60–?)
EOSINOPHIL # BLD AUTO: 0.32 X10(3) UL (ref 0–0.7)
EOSINOPHIL NFR BLD AUTO: 5.1 %
ERYTHROCYTE [DISTWIDTH] IN BLOOD BY AUTOMATED COUNT: 12.8 %
GLOBULIN PLAS-MCNC: 3.9 G/DL (ref 2.8–4.4)
GLUCOSE BLD-MCNC: 92 MG/DL (ref 70–99)
HCT VFR BLD AUTO: 37.9 %
HGB BLD-MCNC: 12.5 G/DL
IMM GRANULOCYTES # BLD AUTO: 0.02 X10(3) UL (ref 0–1)
IMM GRANULOCYTES NFR BLD: 0.3 %
LYMPHOCYTES # BLD AUTO: 1.47 X10(3) UL (ref 1–4)
LYMPHOCYTES NFR BLD AUTO: 23.3 %
MCH RBC QN AUTO: 30.9 PG (ref 26–34)
MCHC RBC AUTO-ENTMCNC: 33 G/DL (ref 31–37)
MCV RBC AUTO: 93.6 FL
MONOCYTES # BLD AUTO: 0.65 X10(3) UL (ref 0.1–1)
MONOCYTES NFR BLD AUTO: 10.3 %
NEUTROPHILS # BLD AUTO: 3.76 X10 (3) UL (ref 1.5–7.7)
NEUTROPHILS # BLD AUTO: 3.76 X10(3) UL (ref 1.5–7.7)
NEUTROPHILS NFR BLD AUTO: 59.7 %
OSMOLALITY SERPL CALC.SUM OF ELEC: 286 MOSM/KG (ref 275–295)
PLATELET # BLD AUTO: 252 10(3)UL (ref 150–450)
PLATELET # BLD AUTO: 272 10(3)UL (ref 150–450)
POTASSIUM SERPL-SCNC: 3.6 MMOL/L (ref 3.5–5.1)
PROT SERPL-MCNC: 7.2 G/DL (ref 6.4–8.2)
RBC # BLD AUTO: 4.05 X10(6)UL
SODIUM SERPL-SCNC: 140 MMOL/L (ref 136–145)
WBC # BLD AUTO: 6.3 X10(3) UL (ref 4–11)

## 2024-05-29 PROCEDURE — 0FT44ZZ RESECTION OF GALLBLADDER, PERCUTANEOUS ENDOSCOPIC APPROACH: ICD-10-PCS | Performed by: STUDENT IN AN ORGANIZED HEALTH CARE EDUCATION/TRAINING PROGRAM

## 2024-05-29 PROCEDURE — BF53200 OTHER IMAGING OF GALLBLADDER AND BILE DUCTS USING FLUORESCING AGENT, INDOCYANINE GREEN DYE, INTRAOPERATIVE: ICD-10-PCS | Performed by: STUDENT IN AN ORGANIZED HEALTH CARE EDUCATION/TRAINING PROGRAM

## 2024-05-29 PROCEDURE — 1159F MED LIST DOCD IN RCRD: CPT | Performed by: INTERNAL MEDICINE

## 2024-05-29 PROCEDURE — 74300 X-RAY BILE DUCTS/PANCREAS: CPT | Performed by: STUDENT IN AN ORGANIZED HEALTH CARE EDUCATION/TRAINING PROGRAM

## 2024-05-29 PROCEDURE — 3E0T3BZ INTRODUCTION OF ANESTHETIC AGENT INTO PERIPHERAL NERVES AND PLEXI, PERCUTANEOUS APPROACH: ICD-10-PCS | Performed by: STUDENT IN AN ORGANIZED HEALTH CARE EDUCATION/TRAINING PROGRAM

## 2024-05-29 PROCEDURE — 3079F DIAST BP 80-89 MM HG: CPT | Performed by: INTERNAL MEDICINE

## 2024-05-29 PROCEDURE — BF101ZZ FLUOROSCOPY OF BILE DUCTS USING LOW OSMOLAR CONTRAST: ICD-10-PCS | Performed by: STUDENT IN AN ORGANIZED HEALTH CARE EDUCATION/TRAINING PROGRAM

## 2024-05-29 PROCEDURE — 99232 SBSQ HOSP IP/OBS MODERATE 35: CPT | Performed by: INTERNAL MEDICINE

## 2024-05-29 PROCEDURE — 3077F SYST BP >= 140 MM HG: CPT | Performed by: INTERNAL MEDICINE

## 2024-05-29 RX ORDER — HYDROMORPHONE HYDROCHLORIDE 1 MG/ML
0.6 INJECTION, SOLUTION INTRAMUSCULAR; INTRAVENOUS; SUBCUTANEOUS EVERY 5 MIN PRN
Status: DISCONTINUED | OUTPATIENT
Start: 2024-05-29 | End: 2024-05-29 | Stop reason: HOSPADM

## 2024-05-29 RX ORDER — BUPIVACAINE HYDROCHLORIDE 2.5 MG/ML
INJECTION, SOLUTION EPIDURAL; INFILTRATION; INTRACAUDAL AS NEEDED
Status: DISCONTINUED | OUTPATIENT
Start: 2024-05-29 | End: 2024-05-29 | Stop reason: HOSPADM

## 2024-05-29 RX ORDER — ACETAMINOPHEN 10 MG/ML
INJECTION, SOLUTION INTRAVENOUS AS NEEDED
Status: DISCONTINUED | OUTPATIENT
Start: 2024-05-29 | End: 2024-05-29 | Stop reason: SURG

## 2024-05-29 RX ORDER — LOSARTAN POTASSIUM 100 MG/1
100 TABLET ORAL DAILY
Status: DISCONTINUED | OUTPATIENT
Start: 2024-05-30 | End: 2024-05-31

## 2024-05-29 RX ORDER — LIDOCAINE HYDROCHLORIDE AND EPINEPHRINE 10; 10 MG/ML; UG/ML
INJECTION, SOLUTION INFILTRATION; PERINEURAL AS NEEDED
Status: DISCONTINUED | OUTPATIENT
Start: 2024-05-29 | End: 2024-05-29 | Stop reason: HOSPADM

## 2024-05-29 RX ORDER — GLYCOPYRROLATE 0.2 MG/ML
INJECTION, SOLUTION INTRAMUSCULAR; INTRAVENOUS AS NEEDED
Status: DISCONTINUED | OUTPATIENT
Start: 2024-05-29 | End: 2024-05-29 | Stop reason: SURG

## 2024-05-29 RX ORDER — LABETALOL HYDROCHLORIDE 5 MG/ML
10 INJECTION, SOLUTION INTRAVENOUS EVERY 4 HOURS PRN
Status: DISCONTINUED | OUTPATIENT
Start: 2024-05-29 | End: 2024-05-31

## 2024-05-29 RX ORDER — NALOXONE HYDROCHLORIDE 0.4 MG/ML
0.08 INJECTION, SOLUTION INTRAMUSCULAR; INTRAVENOUS; SUBCUTANEOUS AS NEEDED
Status: DISCONTINUED | OUTPATIENT
Start: 2024-05-29 | End: 2024-05-29 | Stop reason: HOSPADM

## 2024-05-29 RX ORDER — ONDANSETRON 2 MG/ML
4 INJECTION INTRAMUSCULAR; INTRAVENOUS EVERY 6 HOURS PRN
Status: DISCONTINUED | OUTPATIENT
Start: 2024-05-29 | End: 2024-05-29 | Stop reason: HOSPADM

## 2024-05-29 RX ORDER — ROCURONIUM BROMIDE 10 MG/ML
INJECTION, SOLUTION INTRAVENOUS AS NEEDED
Status: DISCONTINUED | OUTPATIENT
Start: 2024-05-29 | End: 2024-05-29 | Stop reason: SURG

## 2024-05-29 RX ORDER — SODIUM CHLORIDE, SODIUM LACTATE, POTASSIUM CHLORIDE, CALCIUM CHLORIDE 600; 310; 30; 20 MG/100ML; MG/100ML; MG/100ML; MG/100ML
INJECTION, SOLUTION INTRAVENOUS CONTINUOUS
Status: DISCONTINUED | OUTPATIENT
Start: 2024-05-29 | End: 2024-05-29 | Stop reason: HOSPADM

## 2024-05-29 RX ORDER — LABETALOL HYDROCHLORIDE 5 MG/ML
INJECTION, SOLUTION INTRAVENOUS
Status: COMPLETED
Start: 2024-05-29 | End: 2024-05-29

## 2024-05-29 RX ORDER — ONDANSETRON 2 MG/ML
INJECTION INTRAMUSCULAR; INTRAVENOUS AS NEEDED
Status: DISCONTINUED | OUTPATIENT
Start: 2024-05-29 | End: 2024-05-29 | Stop reason: SURG

## 2024-05-29 RX ORDER — INDOCYANINE GREEN AND WATER 25 MG
5 KIT INJECTION ONCE
Status: COMPLETED | OUTPATIENT
Start: 2024-05-29 | End: 2024-05-29

## 2024-05-29 RX ORDER — DEXAMETHASONE SODIUM PHOSPHATE 4 MG/ML
VIAL (ML) INJECTION AS NEEDED
Status: DISCONTINUED | OUTPATIENT
Start: 2024-05-29 | End: 2024-05-29 | Stop reason: SURG

## 2024-05-29 RX ORDER — SODIUM CHLORIDE, SODIUM LACTATE, POTASSIUM CHLORIDE, CALCIUM CHLORIDE 600; 310; 30; 20 MG/100ML; MG/100ML; MG/100ML; MG/100ML
INJECTION, SOLUTION INTRAVENOUS CONTINUOUS PRN
Status: DISCONTINUED | OUTPATIENT
Start: 2024-05-29 | End: 2024-05-29 | Stop reason: SURG

## 2024-05-29 RX ORDER — LABETALOL HYDROCHLORIDE 5 MG/ML
INJECTION, SOLUTION INTRAVENOUS AS NEEDED
Status: DISCONTINUED | OUTPATIENT
Start: 2024-05-29 | End: 2024-05-29 | Stop reason: SURG

## 2024-05-29 RX ORDER — LIDOCAINE HYDROCHLORIDE 10 MG/ML
INJECTION, SOLUTION EPIDURAL; INFILTRATION; INTRACAUDAL; PERINEURAL AS NEEDED
Status: DISCONTINUED | OUTPATIENT
Start: 2024-05-29 | End: 2024-05-29 | Stop reason: SURG

## 2024-05-29 RX ORDER — HYDROMORPHONE HYDROCHLORIDE 1 MG/ML
0.2 INJECTION, SOLUTION INTRAMUSCULAR; INTRAVENOUS; SUBCUTANEOUS EVERY 5 MIN PRN
Status: DISCONTINUED | OUTPATIENT
Start: 2024-05-29 | End: 2024-05-29 | Stop reason: HOSPADM

## 2024-05-29 RX ORDER — HYDROMORPHONE HYDROCHLORIDE 1 MG/ML
0.4 INJECTION, SOLUTION INTRAMUSCULAR; INTRAVENOUS; SUBCUTANEOUS EVERY 5 MIN PRN
Status: DISCONTINUED | OUTPATIENT
Start: 2024-05-29 | End: 2024-05-29 | Stop reason: HOSPADM

## 2024-05-29 RX ORDER — HYDRALAZINE HYDROCHLORIDE 20 MG/ML
5 INJECTION INTRAMUSCULAR; INTRAVENOUS EVERY 6 HOURS PRN
Status: DISCONTINUED | OUTPATIENT
Start: 2024-05-29 | End: 2024-05-30

## 2024-05-29 RX ADMIN — DEXAMETHASONE SODIUM PHOSPHATE 4 MG: 4 MG/ML VIAL (ML) INJECTION at 17:18:00

## 2024-05-29 RX ADMIN — ONDANSETRON 4 MG: 2 INJECTION INTRAMUSCULAR; INTRAVENOUS at 18:26:00

## 2024-05-29 RX ADMIN — ROCURONIUM BROMIDE 40 MG: 10 INJECTION, SOLUTION INTRAVENOUS at 17:14:00

## 2024-05-29 RX ADMIN — LIDOCAINE HYDROCHLORIDE 50 MG: 10 INJECTION, SOLUTION EPIDURAL; INFILTRATION; INTRACAUDAL; PERINEURAL at 17:14:00

## 2024-05-29 RX ADMIN — ACETAMINOPHEN 1000 MG: 10 INJECTION, SOLUTION INTRAVENOUS at 18:16:00

## 2024-05-29 RX ADMIN — LABETALOL HYDROCHLORIDE 2.5 MG: 5 INJECTION, SOLUTION INTRAVENOUS at 17:51:00

## 2024-05-29 RX ADMIN — GLYCOPYRROLATE 0.2 MG: 0.2 INJECTION, SOLUTION INTRAMUSCULAR; INTRAVENOUS at 17:31:00

## 2024-05-29 RX ADMIN — SODIUM CHLORIDE, SODIUM LACTATE, POTASSIUM CHLORIDE, CALCIUM CHLORIDE: 600; 310; 30; 20 INJECTION, SOLUTION INTRAVENOUS at 17:10:00

## 2024-05-29 NOTE — PLAN OF CARE
Up sitting in the chair. Awake & alert. Denies pain & discomfort at this time. NPO maintained for lap christie tentatively planned for this  pm. IV fluids tolerated well.Currently on rocephin for UTI.No adverse reactions noted. Afebrile, all vital signs stable.Needs attended to. Call light in reach.    Addendum  1311  /87 patient sitting in the chair,not in any pain.Lobetalol 10mg IV administered.Paged MD to inform.Awaiting response.     Problem: SKIN/TISSUE INTEGRITY - ADULT  Goal: Skin integrity remains intact  Description: INTERVENTIONS  - Assess and document risk factors for pressure ulcer development  - Assess and document skin integrity  - Monitor for areas of redness and/or skin breakdown  - Initiate interventions, skin care algorithm/standards of care as needed  Outcome: Progressing     Problem: MUSCULOSKELETAL - ADULT  Goal: Return mobility to safest level of function  Description: INTERVENTIONS:  - Assess patient stability and activity tolerance for standing, transferring and ambulating w/ or w/o assistive devices  - Assist with transfers and ambulation using safe patient handling equipment as needed  - Ensure adequate protection for wounds/incisions during mobilization  - Obtain PT/OT consults as needed  - Advance activity as appropriate  - Communicate ordered activity level and limitations with patient/family  Outcome: Progressing     Problem: GASTROINTESTINAL - ADULT  Goal: Minimal or absence of nausea and vomiting  Description: INTERVENTIONS:  - Maintain adequate hydration with IV or PO as ordered and tolerated  - Nasogastric tube to low intermittent suction as ordered  - Evaluate effectiveness of ordered antiemetic medications  - Provide nonpharmacologic comfort measures as appropriate  - Advance diet as tolerated, if ordered  - Obtain nutritional consult as needed  - Evaluate fluid balance  Outcome: Progressing     Problem: PAIN - ADULT  Goal: Verbalizes/displays adequate comfort level or  patient's stated pain goal  Description: INTERVENTIONS:  - Encourage pt to monitor pain and request assistance  - Assess pain using appropriate pain scale  - Administer analgesics based on type and severity of pain and evaluate response  - Implement non-pharmacological measures as appropriate and evaluate response  - Consider cultural and social influences on pain and pain management  - Manage/alleviate anxiety  - Utilize distraction and/or relaxation techniques  - Monitor for opioid side effects  - Notify MD/LIP if interventions unsuccessful or patient reports new pain  - Anticipate increased pain with activity and pre-medicate as appropriate  Outcome: Progressing

## 2024-05-29 NOTE — ANESTHESIA PREPROCEDURE EVALUATION
PRE-OP EVALUATION    Patient Name: Noemy Barrios    Admit Diagnosis: Choledocholithiasis [K80.50]  Urinary tract infection without hematuria, site unspecified [N39.0]    Pre-op Diagnosis: INPATIENT    LAPAROSCOPIC CHOLECYSTECTOMY WITH ICG AND CHOLANGIOGRAM    Anesthesia Procedure: LAPAROSCOPIC CHOLECYSTECTOMY WITH ICG AND CHOLANGIOGRAM    Surgeons and Role:     * Emily Mae MD - Primary    Pre-op vitals reviewed.  Temp: 98 °F (36.7 °C)  Pulse: 76  Resp: 16  BP: 159/99  SpO2: 97 %  Body mass index is 24.6 kg/m².    Current medications reviewed.  Hospital Medications:   [Transfer Hold] losartan (Cozaar) tab 100 mg  100 mg Oral Daily    [Transfer Hold] labetalol (Trandate) 5 mg/mL injection 10 mg  10 mg Intravenous Q4H PRN    [COMPLETED] indocyanine green (IC-Green) injection 5 mg  5 mg Intravenous Once    [Transfer Hold] oxyCODONE immediate release tab 2.5 mg  2.5 mg Oral Q4H PRN    Or    [Transfer Hold] oxyCODONE immediate release tab 5 mg  5 mg Oral Q4H PRN    Or    [Transfer Hold] oxyCODONE immediate release tab 10 mg  10 mg Oral Q4H PRN    [Transfer Hold] pantoprazole (Protonix) 40 mg in sodium chloride 0.9% PF 10 mL IV push  40 mg Intravenous Daily    [Transfer Hold] enoxaparin (Lovenox) 40 MG/0.4ML SUBQ injection 40 mg  40 mg Subcutaneous Daily    [COMPLETED] hydrALAzine (Apresoline) 20 mg/mL injection 10 mg  10 mg Intravenous Once    [COMPLETED] cefTRIAXone (Rocephin) 1 g in D5W 100 mL IVPB-ADD  1 g Intravenous Once    [Transfer Hold] levothyroxine (Synthroid) tab 50 mcg  50 mcg Oral Before breakfast    [Transfer Hold] ALPRAZolam (Xanax) tab 0.25 mg  0.25 mg Oral BID PRN    [Transfer Hold] venlafaxine ER (Effexor-XR) 24 hr cap 37.5 mg  37.5 mg Oral Daily    [Transfer Hold] acetaminophen (Tylenol Extra Strength) tab 1,000 mg  1,000 mg Oral Q4H PRN    [Transfer Hold] melatonin tab 3 mg  3 mg Oral Nightly PRN    [Transfer Hold] glycerin-hypromellose- (Artificial Tears) 0.2-0.2-1 % ophthalmic  solution 1 drop  1 drop Both Eyes QID PRN    [Transfer Hold] sodium chloride (Saline Mist) 0.65 % nasal solution 1 spray  1 spray Each Nare Q3H PRN    dextrose in lactated ringers 5% infusion  100 mL/hr Intravenous Continuous    [Transfer Hold] ondansetron (Zofran) 4 MG/2ML injection 4 mg  4 mg Intravenous Q6H PRN    [Transfer Hold] metoclopramide (Reglan) 5 mg/mL injection 10 mg  10 mg Intravenous Q8H PRN    [Transfer Hold] polyethylene glycol (PEG 3350) (Miralax) 17 g oral packet 17 g  17 g Oral Daily PRN    [Transfer Hold] sennosides (Senokot) tab 17.2 mg  17.2 mg Oral Nightly PRN    [Transfer Hold] bisacodyl (Dulcolax) 10 MG rectal suppository 10 mg  10 mg Rectal Daily PRN    [Transfer Hold] fleet enema (Fleet) 7-19 GM/118ML rectal enema 133 mL  1 enema Rectal Once PRN    [Transfer Hold] morphINE PF 2 MG/ML injection 1 mg  1 mg Intravenous Q2H PRN    Or    [Transfer Hold] morphINE PF 2 MG/ML injection 2 mg  2 mg Intravenous Q2H PRN    Or    [Transfer Hold] morphINE PF 4 MG/ML injection 4 mg  4 mg Intravenous Q2H PRN    cefTRIAXone (Rocephin) 1 g in D5W 100 mL IVPB-ADD  1 g Intravenous Q24H    sodium chloride 0.9% infusion   Intravenous Continuous    [COMPLETED] ondansetron (Zofran) 4 MG/2ML injection 4 mg  4 mg Intravenous Once    [COMPLETED] ketorolac (Toradol) 15 MG/ML injection 15 mg  15 mg Intravenous Once    [Transfer Hold] morphINE PF 2 MG/ML injection 2 mg  2 mg Intravenous Q30 Min PRN       Outpatient Medications:     Medications Prior to Admission   Medication Sig Dispense Refill Last Dose    ALPRAZolam 0.25 MG Oral Tab Take 1 tablet (0.25 mg total) by mouth 2 (two) times daily as needed for Anxiety.   5/25/2024    denosumab 60 MG/ML Subcutaneous Solution Prefilled Syringe Inject 1 mL (60 mg total) into the skin one time.   5/25/2024    valsartan 80 MG Oral Tab Take 1 tablet (80 mg total) by mouth daily.   5/25/2024    Venlafaxine HCl ER 75 MG Oral Tablet 24 Hr Take 0.5 tablets (37.5 mg total) by mouth  daily.   5/25/2024    Levothyroxine Sodium (SYNTHROID, LEVOTHROID) 75 MCG Oral Tab Take 50 mcg by mouth before breakfast.   5/25/2024    Cholecalciferol (VITAMIN D3) 250 MCG (96081 UT) Oral Cap Take by mouth. (Patient not taking: Reported on 5/25/2024)   Not Taking    metoprolol Tartrate (LOPRESSOR) 25 MG Oral Tab Take 0.5 tablets by mouth 2 (two) times daily. (Patient not taking: Reported on 5/25/2024) 30 tablet 11 Not Taking    Vitamin B-12 (VITAMIN B12) 500 MCG Oral Tab Take 500 mcg by mouth daily. (Patient not taking: Reported on 5/25/2024)   Not Taking    aspirin 81 MG Oral Tab Take 81 mg by mouth daily. Taken 3 times a week - Monday Wednesday and Friday (Patient not taking: Reported on 5/25/2024)   Not Taking       Allergies: Patient has no known allergies.      Anesthesia Evaluation    Patient summary reviewed.    Anesthetic Complications  (-) history of anesthetic complications         GI/Hepatic/Renal      (+) GERD                           Cardiovascular      ECG reviewed.            (+) hypertension                                     Endo/Other                                  Pulmonary               (+) shortness of breath            Neuro/Psych      (+) depression                                Past Surgical History:   Procedure Laterality Date    Colonoscopy       Social History     Socioeconomic History    Marital status:    Tobacco Use    Smoking status: Never    Smokeless tobacco: Never   Substance and Sexual Activity    Alcohol use: Yes     Comment: VERY RARE     History   Drug Use Not on file     Available pre-op labs reviewed.  Lab Results   Component Value Date    WBC 6.3 05/29/2024    RBC 4.05 05/29/2024    HGB 12.5 05/29/2024    HCT 37.9 05/29/2024    MCV 93.6 05/29/2024    MCH 30.9 05/29/2024    MCHC 33.0 05/29/2024    RDW 12.8 05/29/2024    .0 05/29/2024    .0 05/29/2024     Lab Results   Component Value Date     05/29/2024    K 3.6 05/29/2024     05/29/2024     CO2 25.0 05/29/2024    BUN 3 (L) 05/29/2024    CREATSERUM 0.64 05/29/2024    GLU 92 05/29/2024    CA 8.9 05/29/2024            Airway      Mallampati: III  Mouth opening: 3 FB  TM distance: 4 - 6 cm   Cardiovascular             Dental             Pulmonary                     Other findings              ASA: 3   Plan: general  NPO status verified and     Post-procedure pain management plan discussed with surgeon and patient.    Comment: GETA/LMA discussed in detail.  Risk of complications discussed including but not limited to sore throat, cough, PONV discussed. Also, discussed risks including dental injury particularly on any weakened, treated or diseased teeth & pt wishes to proceed  All questions answered.    Plan/risks discussed with: patient and child/children                Present on Admission:  **None**

## 2024-05-29 NOTE — DISCHARGE INSTRUCTIONS
Cholecystectomy  Dr. Emily Mae    MEDICATIONS  For post-operative pain control, the medications are usually tramadol.  This is a narcotic and is best taken by starting with one tablet and repeating every four to six hours as needed.  If the patient does not feel they need the narcotics they shouldn’t take them.  If the pain is severe the patient may take up to two pills every six hours.  The patient can take tylenol 1g three times a day and ibuprofen 400-600mg in between up to 4 times a day as needed for pain as well.  The patient can take zofran 4mg every 6 hours as needed for nausea. The patient can also take miralax or colace as needed for constipation. Please ask your surgeon before resuming blood thinners such as aspirin, Plavix or Coumadin.  All other home medications may be resumed as scheduled.  With severe cholecystitis, antibiotics will also be prescribed.  With antibiotics, please watch for rash, facial swelling or severe diarrhea.    DIET  The patient may resume a general diet immediately.  This is not a good time to eat excessively.  The patient should eat in moderation and stick with foods the patient feels are easy to digest.  Avoid high fat foods in the immediate post-operative time period as this may still cause some problems.  The patient may eat anything in small amounts but foods rich in dairy products, fatty foods or fried foods may cause an upset stomach for up to six weeks after surgery.  There should be no alcohol consumption in the immediate recovery time period or within six hours of taking narcotics.    WOUND CARE  The dressing is usually a dissolvable glue which protects the wound. The patient can take a shower starting the night of surgery, but please, no baths or soaking. Please do not put any creams or ointments on the surgical incisions. If the patient does have a top dressing with clear tape and gauze, this dressing may be removed in 2 days. Do not remove the steri-strips or  butterfly tapes that are white and adherent to the skin.  The steri-strips will eventually peel up at the ends and at this point they may be removed.  This is usually seven to ten days after surgery.  When showering, soap can get on the wounds but do not scrub over the wounds.  No hair dye or chemicals of any kind should get in the wounds.  Avoid tub baths, swimming or sitting in a hot tub for two weeks.  If visible sutures or staples are present they will be removed in the office by the surgeon or nurse.  Most wounds will be closed with dissolving suture underneath the skin.  These sutures will dissolve on their own.  If a drain is present make sure the patient receives drain care instructions from the nurse prior to discharge.  Most patients will not have a drain.    ACTIVITY  Every day the patient should be up, showered and dressed.  Each day the patient should be up and around the house.  The patient should not lie in bed and should not stay in pajamas.  We count on the patient being up, coughing, walking and deep breathing to avoid pneumonia and blood clots in the legs.  Once a day the patient should get out of the house and go shopping, go to the mall, the [employer] store, the [employer] or a restaurant.  The patient may ride in a car but should not drive the car for at least one week.  Patients should be off narcotics for at least 8 hours prior to being a .  The average time off work is 10 to 14 days; most adults will be seeing the surgeon prior to returning to work.  Students will return to school within 1-5 days after discharge from the hospital but will be off gym, sports, and indoors for recess for one month.  Patients may go up and down stairs and lift up to five pounds but no bending, pushing or pulling.  Nothing called work or exercise until the follow up visit.  No ‘stair-master’, power walking, jogging or workouts until the follow up visit.  Patients should seek further activity limits at the time  of their appointment.    APPOINTMENT  Please call our office for an appointment within five to ten days of discharge.  Any fever greater than 100.5, chills, nausea, vomiting, or severe diarrhea please call our office.  If the wound turns red, hot, swollen, becomes increasingly painful, or drains pus call us immediately at [phone].  For life threatening emergencies call 911.  For non-emergent care please call our office after 8:30 a.m. Monday through Friday.  The number listed above is our office number; our phone automatically switches to our answering service if we are not there.  Please do not use the emergency room for non-urgent care.    Thank you for entrusting us with your care.  [facility]--General Surgery

## 2024-05-29 NOTE — OCCUPATIONAL THERAPY NOTE
OT orders received, chart reviewed. Attempted to see patient for OT eval this pm, however noted patient with recent BP of 193/87. Will hold at this time and reattempt as able and as patient appropriate. RN aware and in agreement.

## 2024-05-29 NOTE — ANESTHESIA POSTPROCEDURE EVALUATION
[facility]    [facility]:  [facility]   Age/Gender 89 year old female MRN [id number]   Location [facility] POST ANESTHESIA CARE UNIT Attending Patria Smith MD   Hosp Day # 3 PCP Andrew Montilla MD       Anesthesia Post-op Note    LAPAROSCOPIC CHOLECYSTECTOMY WITH ICG AND CHOLANGIOGRAM    Procedure Summary       Date: 05/29/24 Room / Location:  MAIN OR 05 /  MAIN OR    Anesthesia Start: 1709 Anesthesia Stop: 1840    Procedure: LAPAROSCOPIC CHOLECYSTECTOMY WITH ICG AND CHOLANGIOGRAM (Abdomen) Diagnosis: (INPATIENT)    Surgeons: Emily Mae MD Anesthesiologist: Cory Pneg MD    Anesthesia Type: general ASA Status: 3            Anesthesia Type: general    Vitals Value Taken Time   /91 05/29/24 1838   Temp 98 05/29/24 1840   Pulse 88 05/29/24 1839   Resp 21 05/29/24 1839   SpO2 91 % 05/29/24 1839   Vitals shown include unfiled device data.    Patient Location: PACU    Anesthesia Type: general    Airway Patency: patent    Postop Pain Control: adequate    Mental Status: mildly sedated but able to meaningfully participate in the post-anesthesia evaluation    Nausea/Vomiting: none    Cardiopulmonary/Hydration status: stable euvolemic    Complications: no apparent anesthesia related complications    Postop vital signs: stable    Dental Exam: Unchanged from Preop    Patient to be discharged from PACU when criteria met.

## 2024-05-29 NOTE — PROGRESS NOTES
[facility]  General Surgery Progress Note    Noemy Barrios Patient Status:  Inpatient    1931 MRN [id number]   Location [facility] 4NW-A Attending Patria Smith MD   Hosp Day # 3 PCP Andrew Montilla MD     Subjective:  No acute events overnight.  Patient reports abdominal pain today.  She reports some nausea but denies any vomiting.  She is n.p.o. for surgery today.    Objective/Physical Exam:      Intake/Output Summary (Last 24 hours) at 2024 1601  Last data filed at 2024 0506  Gross per 24 hour   Intake 1582 ml   Output --   Net 1582 ml       Vital Signs:  Blood pressure (!) 159/99, pulse 76, temperature 98 °F (36.7 °C), temperature source Oral, resp. rate 16, weight 143 lb 4.8 oz (65 kg), SpO2 97%.    General: Alert, orientated x3.  Cooperative.  No apparent distress.  HEENT: Exam is unremarkable.  Without scleral icterus.  Mucous membranes are moist. Oropharynx is clear.  Neck: No JVD. Supple.   Lungs: Non labored breathing, equal chest rise  Cardiac: Regular rate and rhythm. No murmur.  Abdomen:  Soft, non-distended, mildly tender to palpation in right upper quadrant  Extremities:  No lower extremity edema noted.  Without clubbing or cyanosis.  2+ pulses x4, motor and sensation grossly intact      Labs:  Lab Results   Component Value Date    WBC 6.3 2024    RBC 4.05 2024    HGB 12.5 2024    HCT 37.9 2024    MCV 93.6 2024    MCH 30.9 2024    MCHC 33.0 2024    RDW 12.8 2024    .0 2024    .0 2024     Lab Results   Component Value Date     2024    K 3.6 2024     2024    CO2 25.0 2024    BUN 3 2024    CREATSERUM 0.64 2024    GLU 92 2024    CA 8.9 2024    ALKPHO 53 2024    ALT 11 2024    AST 19 2024    BILT 0.5 2024    ALB 3.3 2024    TP 7.2 2024          Images:  No results found.    Assessment/Plan:  Patient Active  Problem List   Diagnosis    Chest pain    Dyspnea    GERD (gastroesophageal reflux disease)    HTN (hypertension)    Urinary tract infection without hematuria, site unspecified    ACP (advance care planning)    Biliary colic    Choledocholithiasis       89 year old female with choledocholithiasis and biliary colic    Will proceed with laparoscopic cholecystectomy with ICG guidance and cholangiogram  Procedure explained to the patient  Risks include bleeding, pain, infection, injury to the common bile duct, and need for further intervention  Patient acknowledged understanding and wishes to proceed    Patient can have clear liquids postoperatively  Can plan for discharge tomorrow    Emily Mae MD  [facility] General Surgery  5/29/2024  4:01 PM

## 2024-05-29 NOTE — PROGRESS NOTES
[facility]  GASTROENTEROLOGY PROGRESS NOTE   [facility]     Noemy Barrios Patient Status:  Inpatient    1931 MRN [id number]   Location [facility] 4NW-A Attending Ebony Gil MD   Hosp Day # 3 PCP Andrew Montilla MD       Reason for Consultation:   RUQ Abdominal pain    Subjective:   Plan for OR for lap ccy today   Denies any overt GI bleeding  Still with RUQ discomfort        Patient Active Problem List   Diagnosis    Chest pain    Dyspnea    GERD (gastroesophageal reflux disease)    HTN (hypertension)    Urinary tract infection without hematuria, site unspecified    ACP (advance care planning)    Biliary colic    Choledocholithiasis       PMH, PSH, Fhx, Shx as per initial consult note    Review of Systems    12 point Review of Systems negative unless otherwise mentioned in Subjective.     Physical Exam  /75 (BP Location: Right arm)   Pulse 101   Temp 97.7 °F (36.5 °C) (Oral)   Resp 16   Wt 143 lb 4.8 oz (65 kg)   SpO2 93%   BMI 24.60 kg/m²   Body mass index is 24.6 kg/m².      Gen: No acute distress  Resp: no respiratory distress  Abd: Soft, RUQ mildly -tender, non-distended. No rebound tenderness, no guarding.   Neuro: Aox3.     Diagnostic Data:      Labs:  Recent Labs   Lab 24  1353 24  0540 24  0652   WBC 8.1 8.3 6.5  --    HGB 13.7 13.6 12.2  --    MCV 91.7 92.7 94.4  --    .0 264.0 232.0 252.0       Recent Labs   Lab 24  1353 24  0540   * 143* 120*   BUN 10 5* 2*   CREATSERUM 0.75 0.68 0.67   CA 9.0 9.2 8.7   ALB 3.5 3.7 3.2*   * 137 139   K 4.0 3.9 4.2    106 108   CO2 23.0 25.0 26.0   ALKPHO 67 56 46*   AST 22 19 21   ALT 14 12* 11*   BILT 0.3 0.4 0.3   TP 7.3 7.6 6.6       No results for input(s): \"PTP\", \"INR\" in the last 168 hours.    No data recorded        Imaging: Imaging data reviewed in Epic.    Medications:    pantoprazole  40 mg Intravenous Daily    enoxaparin   40 mg Subcutaneous Daily    levothyroxine  50 mcg Oral Before breakfast    venlafaxine ER  37.5 mg Oral Daily    losartan  50 mg Oral Daily    cefTRIAXone  1 g Intravenous Q24H       Allergies:  No Known Allergies    Imaging:  I have personally reviewed all pertinent available imaging.       ASSESSMENT / PLAN:     Noemy Barrios is a 89 year old female with GI consult for RUQ abdominal pain, which is improved this am. MRCP most recently shows cholelithiasis, without any CBD stone (reviewed prior US - CBD stone may have passed).   MRCP Dilated CBD. Lipase wnl.   HIDA scan noted    Recommendations:   General surgery on consult - plan for OR - lap CCY later today   Will follow up, pending results of IOC - consider role for any GI intervention   DVT ppx  IVF, analgesia, anti-emetics per primary team    D/w patient and RN at bedside.       Adonay Lara MD  [facility]

## 2024-05-29 NOTE — PROGRESS NOTES
[facility]   part of [facility]     Hospitalist Progress Note     Noemy Barrios Patient Status:  Inpatient    1931 MRN [id number]   Location [facility] 4NW-A Attending Zack Lara*   Hosp Day # 3 PCP Andrew Montilla MD     Chief Complaint: Abdominal pain    Subjective:     Patient has abd pain, no N/V/F/C.     Objective:    Review of Systems:   A comprehensive review of systems was completed; pertinent positive and negatives stated in subjective.    Vital signs:  Temp:  [97.7 °F (36.5 °C)-98.5 °F (36.9 °C)] 97.7 °F (36.5 °C)  Pulse:  [] 98  Resp:  [16-18] 16  BP: (138-160)/(66-94) 138/75  SpO2:  [90 %-95 %] 90 %    Physical Exam:    General: No acute distress  Respiratory: No wheezes, no rhonchi  Cardiovascular: S1, S2, regular rate and rhythm  Abdomen: Soft, + tenderness mainly in e[epigastric and RUQ area   Neuro: No new focal deficits.   Extremities: No edema      Diagnostic Data:    Labs:  Recent Labs   Lab 24  1353 24  0540 24  0652   WBC 8.1 8.3 6.5  --    HGB 13.7 13.6 12.2  --    MCV 91.7 92.7 94.4  --    .0 264.0 232.0 252.0       Recent Labs   Lab 24  1353 24  0540   * 143* 120*   BUN 10 5* 2*   CREATSERUM 0.75 0.68 0.67   CA 9.0 9.2 8.7   ALB 3.5 3.7 3.2*   * 137 139   K 4.0 3.9 4.2    106 108   CO2 23.0 25.0 26.0   ALKPHO 67 56 46*   AST 22 19 21   ALT 14 12* 11*   BILT 0.3 0.4 0.3   TP 7.3 7.6 6.6       CrCl cannot be calculated (Unknown ideal weight.).    No results for input(s): \"TROP\", \"TROPHS\", \"CK\" in the last 168 hours.    No results for input(s): \"PTP\", \"INR\" in the last 168 hours.               Microbiology    Hospital Encounter on 24   1. Urine Culture, Routine     Status: Abnormal    Collection Time: 24 12:50 AM    Specimen: Urine, clean catch   Result Value Ref Range    Urine Culture >100,000 CFU/ML Enterobacter cloacae complex (A) N/A        Susceptibility    Enterobacter cloacae complex -  (no method available)     Cefazolin >=64 Resistant      Cefepime <=1 Sensitive      Ceftriaxone <=1 Sensitive      Ciprofloxacin <=0.25 Sensitive      Gentamicin <=1 Sensitive      Meropenem <=0.25 Sensitive      Levofloxacin <=0.12 Sensitive      Nitrofurantoin 64 Intermediate      Piperacillin + Tazobactam <=4 Sensitive      Trimethoprim/Sulfa <=20 Sensitive          Imaging: Reviewed in Epic.    Medications:    pantoprazole  40 mg Intravenous Daily    enoxaparin  40 mg Subcutaneous Daily    levothyroxine  50 mcg Oral Before breakfast    venlafaxine ER  37.5 mg Oral Daily    losartan  50 mg Oral Daily    cefTRIAXone  1 g Intravenous Q24H       Assessment & Plan:      #Bilary colic with choledocholithiasis  -US with distended GB w/o cholecystitis and choledocholithiasis  -Pain control  -HIDA reviewed   -lap christie this pm     #UTI- enterobacter  -continue Rocephin d4     #HTN  -Cont. ARB, inc to 100 mg daily      #Anxiety/depression  -Cont home meds     #Hypothyroid  -Synthroid    Patria Smith MD        Supplementary Documentation:     Quality:  DVT Mechanical Prophylaxis:     Early ambuation  DVT Pharmacologic Prophylaxis   Medication    enoxaparin (Lovenox) 40 MG/0.4ML SUBQ injection 40 mg                Code Status: DNAR/Selective Treatment  Sargent: No urinary catheter in place  Sargent Duration (in days):   Central line:    EUNICE:     Discharge is dependent on: clinical improvement  At this point Ms. Barrios is expected to be discharge to: Home    The 21st Century Cures Act makes medical notes like these available to patients in the interest of transparency. Please be advised this is a medical document. Medical documents are intended to carry relevant information, facts as evident, and the clinical opinion of the practitioner. The medical note is intended as peer to peer communication and may appear blunt or direct. It is written in medical language and may contain  abbreviations or verbiage that are unfamiliar.

## 2024-05-29 NOTE — ANESTHESIA PROCEDURE NOTES
Airway  Date/Time: 5/29/2024 5:17 PM  Urgency: elective      General Information and Staff    Patient location during procedure: OR  Anesthesiologist: Cory Peng MD  Performed: anesthesiologist   Performed by: Cory Peng MD  Authorized by: Cory Peng MD      Indications and Patient Condition  Indications for airway management: anesthesia  Sedation level: deep  Preoxygenated: yes  Patient position: sniffing  Mask difficulty assessment: 1 - vent by mask    Final Airway Details  Final airway type: endotracheal airway      Successful airway: ETT  Cuffed: yes   Successful intubation technique: direct laryngoscopy  Endotracheal tube insertion site: oral  Blade: Janee    Cormack-Lehane Classification: grade I - full view of glottis  Placement verified by: capnometry   Measured from: lips  ETT to lips (cm): 22  Number of attempts at approach: 1    Additional Comments  atraumatic

## 2024-05-29 NOTE — PLAN OF CARE
Patient NPO post midnight in preparation for Lap Olga in the morning, patient voiced understanding on Plan of care. Alert and oriented x4. Afebrile. No complaints of abdominal pain at this time. Denies nausea and vomiting. No diarrhea noted so far. Maintained on IV antibiotics for UTI, tolerating well. IV Fluid continuous. Up to the bathroom with standby assist +walker. Fall and safety precautions in place. Call light within reach.     Problem: PAIN - ADULT  Goal: Verbalizes/displays adequate comfort level or patient's stated pain goal  Description: INTERVENTIONS:  - Encourage pt to monitor pain and request assistance  - Assess pain using appropriate pain scale  - Administer analgesics based on type and severity of pain and evaluate response  - Implement non-pharmacological measures as appropriate and evaluate response  - Consider cultural and social influences on pain and pain management  - Manage/alleviate anxiety  - Utilize distraction and/or relaxation techniques  - Monitor for opioid side effects  - Notify MD/LIP if interventions unsuccessful or patient reports new pain  - Anticipate increased pain with activity and pre-medicate as appropriate  Outcome: Progressing     Problem: GASTROINTESTINAL - ADULT  Goal: Minimal or absence of nausea and vomiting  Description: INTERVENTIONS:  - Maintain adequate hydration with IV or PO as ordered and tolerated  - Nasogastric tube to low intermittent suction as ordered  - Evaluate effectiveness of ordered antiemetic medications  - Provide nonpharmacologic comfort measures as appropriate  - Advance diet as tolerated, if ordered  - Obtain nutritional consult as needed  - Evaluate fluid balance  Outcome: Progressing

## 2024-05-29 NOTE — OPERATIVE REPORT
OPERATIVE NOTE    Noemy Barrios Location: OR   DONALD [id number] MRN [id number]   Admission Date 5/25/2024 Operation Date 5/29/2024   Attending Physician Patria Smith MD Operating Physician Emily Mae MD     PREOPERATIVE DIAGNOSIS  Symptomatic cholelithiasis  Choledocholithiasis    POSTOPERATIVE DIAGNOSIS  Same    PROCEDURE PERFORMED  Laparoscopic cholecystectomy with ICG guidance  Transversus abdominis plane block  Intra-operative cholangiogram    SURGEON  Emily Mae MD    ASSISTANTS  BURTON Gautam PA    ANESTHESIA  General    INDICATIONS   This is a 89-year-old woman with a history of choledocholithiasis status post ERCP and stent placement 5 years ago at [place].  She presented to the hospital with right upper quadrant abdominal pain.  Ultrasound imaging demonstrated a dilated gallbladder with cholelithiasis as well as dilated CBD with filling defects.  Patient underwent MRCP which was negative for choledocholithiasis.  She also had HIDA scan which was negative for acute cholecystitis.  Patient continued to have right upper quadrant abdominal pain as well as nausea.  Cholecystectomy was indicated.    FINDINGS  Omental adhesions as well as adhesions to the stomach and duodenum taken down sharply, distended gallbladder without any inflammation, cystic and common bile duct identified via ICG guidance, cholangiogram showed dilated cystic as well as common bile duct but no filling defects or changes at the ampulla with good flow of contrast into the duodenum    FINDINGS/DESCRIPTION OF PROCEDURE  After informed consent, patient was brought to the operating room and placed in supine position with arms out. Bilateral SCDs were placed and pre-operative antibiotics administered. Anesthesia was induced without any complication. Patient was prepped and draped in the usual sterile fashion. Time out was performed confirming patient, procedure, and site.    The Veress needle was used to gain  pneumoperitoneum. Using blade, a curvilinear supraumbilical incision was made. Veress needle was inserted just under the umbilical stalk with audible clicks. On aspiration, there was no bowel contents.  Saline flowed easily confirming presence in the abdomen.Pneumoperitoneum was created with CO2. An 11mm port was inserted. Upon entrance, no injury was noted to omentum or bowel at site of entrance.  Three 5mm ports were placed under direct visualization; two on the right lateral aspect of the abdomen and the third subxiphoid, triangulating toward the location of the gallbladder. Transversus abdominis plane block was performed.    Upon inspection of the abdomen, gallbladder was soft and uninflamed.  Gallbladder was distended and had omental adhesions as well as adhesions to the stomach and duodenum.  The omental adhesions were taken down with cautery.  The adhesions to the stomach and duodenum were taken down sharply.  Gallbladder was grasped and retracted above the liver. Dissection was started at the infundibulum. Cystic duct and artery were identified, clearly seeing the critical view of safety.  Patient was administered indocyanine green dye preoperatively.  Indocyanine green dye was noted in the gallbladder, cystic duct, and into the common bile duct.  The cystic duct as well as the common bile duct were both dilated.  The cystic duct was then clipped and partially opened. Cholangiogram catheter was inserted into the cystic duct and clipped in place. Cholangiogram was done and showed dilated common bile duct but no filling defects with good flow of contrast into the duodenum. Catheter was removed and the cystic duct clipped and divided. The gallbladder was then resected from the fossa and placed in endocatch bag. The gallbladder fossa was irrigated until clear. Hemostasis achieved. Gallbladder was extracted from abdominal cavity and sent for pathology.  Pneumoperitoneum was evacuated.  Fascia at the umbilicus was  closed with 0 Vicryl.  Skin incisions were closed with 4-o monocryl. Incisions were washed and dressed with dermabond.     At the end of the case, all counts were correct. Patient tolerated procedure well. Patient was awakened and transferred to PACU in a hemodynamically stable condition.     SPECIMENS REMOVED  Gallbladder    ESTIMATED BLOOD LOSS  30 ml    COMPLICATIONS  None    Emily Mae MD     Alert and oriented, no focal deficits, no motor or sensory deficits.

## 2024-05-30 LAB
ALBUMIN SERPL-MCNC: 3.4 G/DL (ref 3.4–5)
ALBUMIN/GLOB SERPL: 0.8 {RATIO} (ref 1–2)
ALP LIVER SERPL-CCNC: 57 U/L
ALT SERPL-CCNC: 14 U/L
ANION GAP SERPL CALC-SCNC: 10 MMOL/L (ref 0–18)
AST SERPL-CCNC: 24 U/L (ref 15–37)
BASOPHILS # BLD AUTO: 0.02 X10(3) UL (ref 0–0.2)
BASOPHILS NFR BLD AUTO: 0.2 %
BILIRUB SERPL-MCNC: 0.6 MG/DL (ref 0.1–2)
BUN BLD-MCNC: 6 MG/DL (ref 9–23)
CALCIUM BLD-MCNC: 8.6 MG/DL (ref 8.5–10.1)
CHLORIDE SERPL-SCNC: 101 MMOL/L (ref 98–112)
CO2 SERPL-SCNC: 23 MMOL/L (ref 21–32)
CREAT BLD-MCNC: 0.63 MG/DL
EGFRCR SERPLBLD CKD-EPI 2021: 83 ML/MIN/1.73M2 (ref 60–?)
EOSINOPHIL # BLD AUTO: 0.04 X10(3) UL (ref 0–0.7)
EOSINOPHIL NFR BLD AUTO: 0.5 %
ERYTHROCYTE [DISTWIDTH] IN BLOOD BY AUTOMATED COUNT: 12.7 %
GLOBULIN PLAS-MCNC: 4.2 G/DL (ref 2.8–4.4)
GLUCOSE BLD-MCNC: 140 MG/DL (ref 70–99)
HCT VFR BLD AUTO: 38.9 %
HGB BLD-MCNC: 13.3 G/DL
IMM GRANULOCYTES # BLD AUTO: 0.04 X10(3) UL (ref 0–1)
IMM GRANULOCYTES NFR BLD: 0.5 %
LYMPHOCYTES # BLD AUTO: 0.82 X10(3) UL (ref 1–4)
LYMPHOCYTES NFR BLD AUTO: 9.3 %
MCH RBC QN AUTO: 31.4 PG (ref 26–34)
MCHC RBC AUTO-ENTMCNC: 34.2 G/DL (ref 31–37)
MCV RBC AUTO: 92 FL
MONOCYTES # BLD AUTO: 0.66 X10(3) UL (ref 0.1–1)
MONOCYTES NFR BLD AUTO: 7.5 %
NEUTROPHILS # BLD AUTO: 7.22 X10 (3) UL (ref 1.5–7.7)
NEUTROPHILS # BLD AUTO: 7.22 X10(3) UL (ref 1.5–7.7)
NEUTROPHILS NFR BLD AUTO: 82 %
OSMOLALITY SERPL CALC.SUM OF ELEC: 278 MOSM/KG (ref 275–295)
PLATELET # BLD AUTO: 199 10(3)UL (ref 150–450)
PLATELETS.RETICULATED NFR BLD AUTO: 10.3 % (ref 0–7)
POTASSIUM SERPL-SCNC: 3.8 MMOL/L (ref 3.5–5.1)
PROT SERPL-MCNC: 7.6 G/DL (ref 6.4–8.2)
RBC # BLD AUTO: 4.23 X10(6)UL
SODIUM SERPL-SCNC: 134 MMOL/L (ref 136–145)
WBC # BLD AUTO: 8.8 X10(3) UL (ref 4–11)

## 2024-05-30 PROCEDURE — 99232 SBSQ HOSP IP/OBS MODERATE 35: CPT | Performed by: INTERNAL MEDICINE

## 2024-05-30 RX ORDER — CETIRIZINE HYDROCHLORIDE 5 MG/1
5 TABLET ORAL DAILY
Status: DISCONTINUED | OUTPATIENT
Start: 2024-05-30 | End: 2024-05-31

## 2024-05-30 RX ORDER — AMLODIPINE BESYLATE 5 MG/1
5 TABLET ORAL DAILY
Status: DISCONTINUED | OUTPATIENT
Start: 2024-05-30 | End: 2024-05-31

## 2024-05-30 RX ORDER — SODIUM CHLORIDE 9 MG/ML
INJECTION, SOLUTION INTRAVENOUS CONTINUOUS
Status: DISCONTINUED | OUTPATIENT
Start: 2024-05-30 | End: 2024-05-31

## 2024-05-30 RX ORDER — PANTOPRAZOLE SODIUM 40 MG/1
40 TABLET, DELAYED RELEASE ORAL
Status: DISCONTINUED | OUTPATIENT
Start: 2024-05-31 | End: 2024-05-31

## 2024-05-30 RX ORDER — CIPROFLOXACIN 500 MG/1
500 TABLET, FILM COATED ORAL 2 TIMES DAILY
Qty: 4 TABLET | Refills: 0 | Status: SHIPPED | OUTPATIENT
Start: 2024-05-30 | End: 2024-05-31

## 2024-05-30 RX ORDER — CETIRIZINE HYDROCHLORIDE 10 MG/1
10 TABLET ORAL DAILY
Status: DISCONTINUED | OUTPATIENT
Start: 2024-05-30 | End: 2024-05-30

## 2024-05-30 NOTE — PROGRESS NOTES
[facility]  GASTROENTEROLOGY PROGRESS NOTE   [facility]     Noemy Barrios Patient Status:  Inpatient    1931 MRN [id number]   Location [facility] 4NW-A Attending Ebony Gil MD   Hosp Day # 4 PCP Andrew Montilla MD       Reason for Consultation:   RUQ Abdominal pain    Subjective:   OR yest for lap CCY  Denies any n/v.   No overt GI bleeding        Patient Active Problem List   Diagnosis    Chest pain    Dyspnea    GERD (gastroesophageal reflux disease)    HTN (hypertension)    Urinary tract infection without hematuria, site unspecified    ACP (advance care planning)    Biliary colic    Choledocholithiasis       PMH, PSH, Fhx, Shx as per initial consult note    Review of Systems    12 point Review of Systems negative unless otherwise mentioned in Subjective.     Physical Exam  BP 94/68 (BP Location: Right arm)   Pulse 97   Temp 97.7 °F (36.5 °C) (Oral)   Resp 16   Wt 143 lb 4.8 oz (65 kg)   SpO2 91%   BMI 24.60 kg/m²   Body mass index is 24.6 kg/m².      Gen: No acute distress  Resp: no respiratory distress  Abd: Soft, appropriately incisional  -tender, non-distended. No rebound tenderness, no guarding.   Neuro: Aox3.     Diagnostic Data:      Labs:  Recent Labs   Lab 24  0540 24  0652 24  0736   WBC 6.5 6.3 8.8   HGB 12.2 12.5 13.3   MCV 94.4 93.6 92.0   .0 272.0  252.0 199.0       Recent Labs   Lab 24  0540 24  0902 24  0736   * 92 140*   BUN 2* 3* 6*   CREATSERUM 0.67 0.64 0.63   CA 8.7 8.9 8.6   ALB 3.2* 3.3* 3.4    140 134*   K 4.2 3.6 3.8    108 101   CO2 26.0 25.0 23.0   ALKPHO 46* 53* 57   AST 21 19 24   ALT 11* 11* 14   BILT 0.3 0.5 0.6   TP 6.6 7.2 7.6       No results for input(s): \"PTP\", \"INR\" in the last 168 hours.    No data recorded        Imaging: Imaging data reviewed in Epic.    Medications:    amLODIPine  5 mg Oral Daily    losartan  100 mg Oral Daily    pantoprazole  40 mg Intravenous  Daily    enoxaparin  40 mg Subcutaneous Daily    levothyroxine  50 mcg Oral Before breakfast    venlafaxine ER  37.5 mg Oral Daily    cefTRIAXone  1 g Intravenous Q24H       Allergies:  No Known Allergies    Imaging:  I have personally reviewed all pertinent available imaging.       ASSESSMENT / PLAN:     Noemy Barrios is a 89 year old female with GI consult for RUQ abdominal pain. S/p Lap CCY 5/29. IOC neg.     Recommendations:   Post operative care per surgery team   IOC neg - no plan for EUS/ERCP   DVT ppx  IVF, analgesia, anti-emetics per primary team    GI will signoff, please page with questions  Follow-up in SGI OV in 3-4 weeks      Adonay Lara MD  [facility]

## 2024-05-30 NOTE — PLAN OF CARE
Patient s/p Lap Olga. Noted with 4 Lap sites, with glue, open to air. Alert, drowsy, easy to arouse. Afebrile. On 2LO2 for now post procedure with sats 92-95%. Complained of abdominal pain, PRN pain meds given with relief. No nausea and vomiting. BP elevated, MD notified, new orders given, monitored.  Maintained on IV antibiotics, tolerating well. Fall precautions in place. Call light within reach. Needs attended.     Problem: PAIN - ADULT  Goal: Verbalizes/displays adequate comfort level or patient's stated pain goal  Description: INTERVENTIONS:  - Encourage pt to monitor pain and request assistance  - Assess pain using appropriate pain scale  - Administer analgesics based on type and severity of pain and evaluate response  - Implement non-pharmacological measures as appropriate and evaluate response  - Consider cultural and social influences on pain and pain management  - Manage/alleviate anxiety  - Utilize distraction and/or relaxation techniques  - Monitor for opioid side effects  - Notify MD/LIP if interventions unsuccessful or patient reports new pain  - Anticipate increased pain with activity and pre-medicate as appropriate  Outcome: Progressing     Problem: GASTROINTESTINAL - ADULT  Goal: Minimal or absence of nausea and vomiting  Description: INTERVENTIONS:  - Maintain adequate hydration with IV or PO as ordered and tolerated  - Nasogastric tube to low intermittent suction as ordered  - Evaluate effectiveness of ordered antiemetic medications  - Provide nonpharmacologic comfort measures as appropriate  - Advance diet as tolerated, if ordered  - Obtain nutritional consult as needed  - Evaluate fluid balance  Outcome: Progressing

## 2024-05-30 NOTE — CM/SW NOTE
05/30/24 1100   Discharge disposition   Expected discharge disposition [facility]   Post Acute Care Provider Alma Carroll   Discharge transportation Private car     Pt discussed in rounds with RN, anticipating medical clearance. SW notified [facility], spoke to  she will let building know. RN to call for report once cleared for DC.    Nurses please call report to:     [facility] [phone]    SW/CM to remain available for dc planning, and/or additional need for support.    GIAN Ramirez  Discharge Planner  [id number]

## 2024-05-30 NOTE — PROGRESS NOTES
[facility]   part of [facility]     Hospitalist Progress Note     Noemy Bariros Patient Status:  Inpatient    1931 MRN [id number]   Location [facility] 4NW-A Attending Zack Lara*   Hosp Day # 4 PCP Andrew Montilla MD     Chief Complaint:nausea     Subjective:     Patient with nausea, feels very weak today, not much appetite.     Objective:    Review of Systems:   A comprehensive review of systems was completed; pertinent positive and negatives stated in subjective.    Vital signs:  Temp:  [97.3 °F (36.3 °C)-98.1 °F (36.7 °C)] 97.7 °F (36.5 °C)  Pulse:  [] 75  Resp:  [15-28] 16  BP: ()/() 94/68  SpO2:  [87 %-97 %] 95 %    Physical Exam:    General: No acute distress  Respiratory: No wheezes, no rhonchi  Cardiovascular: S1, S2, regular rate and rhythm  Abdomen: Soft, mildly distended, tender over surgical incisions  Neuro: No new focal deficits.   Extremities: No edema      Diagnostic Data:    Labs:  Recent Labs   Lab 24  2233 24  1353 24  0540 24  0652   WBC 8.1 8.3 6.5 6.3   HGB 13.7 13.6 12.2 12.5   MCV 91.7 92.7 94.4 93.6   .0 264.0 232.0 272.0  252.0       Recent Labs   Lab 24  1353 24  0540 24  0902   * 120* 92   BUN 5* 2* 3*   CREATSERUM 0.68 0.67 0.64   CA 9.2 8.7 8.9   ALB 3.7 3.2* 3.3*    139 140   K 3.9 4.2 3.6    108 108   CO2 25.0 26.0 25.0   ALKPHO 56 46* 53*   AST 19 21 19   ALT 12* 11* 11*   BILT 0.4 0.3 0.5   TP 7.6 6.6 7.2       CrCl cannot be calculated (Unknown ideal weight.).    No results for input(s): \"TROP\", \"TROPHS\", \"CK\" in the last 168 hours.    No results for input(s): \"PTP\", \"INR\" in the last 168 hours.               Microbiology    Hospital Encounter on 24   1. Urine Culture, Routine     Status: Abnormal    Collection Time: 24 12:50 AM    Specimen: Urine, clean catch   Result Value Ref Range    Urine Culture >100,000 CFU/ML Enterobacter cloacae  complex (A) N/A       Susceptibility    Enterobacter cloacae complex -  (no method available)     Cefazolin >=64 Resistant      Cefepime <=1 Sensitive      Ceftriaxone <=1 Sensitive      Ciprofloxacin <=0.25 Sensitive      Gentamicin <=1 Sensitive      Meropenem <=0.25 Sensitive      Levofloxacin <=0.12 Sensitive      Nitrofurantoin 64 Intermediate      Piperacillin + Tazobactam <=4 Sensitive      Trimethoprim/Sulfa <=20 Sensitive          Imaging: Reviewed in Epic.    Medications:    losartan  100 mg Oral Daily    pantoprazole  40 mg Intravenous Daily    enoxaparin  40 mg Subcutaneous Daily    levothyroxine  50 mcg Oral Before breakfast    venlafaxine ER  37.5 mg Oral Daily    cefTRIAXone  1 g Intravenous Q24H       Assessment & Plan:      #Bilary colic with choledocholithiasis  - sp lap cholecystectomy with negative IOC POD 1  -US with distended GB w/o cholecystitis and choledocholithiasis  -Pain control  - ambulate   - supportive care      #UTI- enterobacter  -continue Rocephin d5/7     #HTN  -Cont. ARB (inc dose 5/29) may add amlodipine per courmarco antonio      #Anxiety/depression  -Cont home meds     #Hypothyroid  -Synthroid      HOLD DC TODAY and reassess in am     Patria Smith MD        Supplementary Documentation:     Quality:  DVT Mechanical Prophylaxis:     Early ambuation  DVT Pharmacologic Prophylaxis   Medication    enoxaparin (Lovenox) 40 MG/0.4ML SUBQ injection 40 mg                Code Status: DNAR/Selective Treatment  Sargent: No urinary catheter in place  Sargent Duration (in days):   Central line:    EUNICE:     Discharge is dependent on: clinical improvement  At this point Ms. Barrios is expected to be discharge to: Home    The 21st Century Cures Act makes medical notes like these available to patients in the interest of transparency. Please be advised this is a medical document. Medical documents are intended to carry relevant information, facts as evident, and the clinical opinion of the practitioner. The  medical note is intended as peer to peer communication and may appear blunt or direct. It is written in medical language and may contain abbreviations or verbiage that are unfamiliar.

## 2024-05-30 NOTE — DIETARY NOTE
[facility]   part of [facility]    NUTRITION ASSESSMENT    Pt does not meet malnutrition criteria at this time.      NUTRITION INTERVENTION:    RD nutrition Care Plan- Encouraged increased PO intake and Initiated ONS (oral nutritional supplements)  Meal and Snacks - Monitor and encourage adequate PO intake.   Medical Food Supplements - Ensure Plus High Protein BID. Rationale/use for oral supplements discussed.    PATIENT STATUS:   05/30/24 89 yo female admitted with UTI. Presented with abdominal pain, US showed cholecystitis and choledocholithiasis. MRCP revealed cholelithiasis, with large stone in the fundus of gallbladder.  Laparoscopic cholecystectomy 5/29.Pt had been NPO/CLD x 4 days, advanced to FLD for breakfast and Low fiber for lunch. Visited pt today after taking lunch, reports feeling a little sore after surgery but tolerated lunch well - cottage cheese, peaches and tea. Pt reports abdominal discomfort for ~1 week PTA but this was not affecting her PO intake and doesn't report weight loss. Offered ordering ONS for added nutrition during admission, pt agreeable.    Pmhx - biliary stents,  ANTHROPOMETRICS:  Ht:  5'4\"  Wt: 65 kg (143 lb 4.8 oz).   BMI: Body mass index is 24.6 kg/m².  IBW: 55 kg    WEIGHT HISTORY:   Weight loss: No    Wt Readings from Last 10 Encounters:   05/26/24 65 kg (143 lb 4.8 oz)   05/26/24 64.1 kg (141 lb 5 oz)   01/07/20 66.2 kg (146 lb)   08/11/14 66.2 kg (146 lb)   07/21/14 63.5 kg (140 lb)   07/11/14 63.5 kg (140 lb)   07/02/14 63.5 kg (139 lb 15.9 oz)        NUTRITION:  Diet:       Procedures    Clear liquid diet Is Patient on Accuchecks? No      Food Allergies: No  Cultural/Ethnic/Amish Preferences Addressed: Yes    Percent Meals Eaten (last 3 days)       Date/Time Percent Meals Eaten (%)    05/27/24 0954 0 %     Percent Meals Eaten (%): NPO at 05/27/24 0954    05/27/24 1200 0 %    05/28/24 0835 0 %     Percent Meals Eaten (%): NPO at 05/28/24 0835    05/28/24 1408  100 %            GI system review: WNL, recovering from surgery Last BM: 5/28 x 1  Skin and wounds: WNL    NUTRITION RELATED PHYSICAL FINDINGS:     1. Body Fat/Muscle Mass:  Age related sarcopenia     2. Fluid Accumulation: none     NUTRITION PRESCRIPTION: 64.1 kg  Calories: 1603 - 1923 calories/day (25-30 kcal/kg)  Protein: 77 - 96 grams protein/day (1.2-1.5 grams protein per kg)  Fluid: ~1 ml/kcal or per MD discretion    NUTRITION DIAGNOSIS/PROBLEM:  Predicted suboptimal energy intake related to altered GI function/GI disorder and need for NPO/CLD x 4 days  as evidenced by need for NPO status      MONITOR AND EVALUATE/NUTRITION GOALS:  PO intake of 75% of meals TID - New  PO intake of 75% of oral nutrition supplement/s - New      MEDICATIONS:  Synthroid, protonix, NS @ 75 mL/hr, Zofran PRN    LABS:  Reviewed    Pt is at Moderate nutrition risk    Smitha Anderson RDN, LDN, [facility]  Clinical Dietitian   [id number]

## 2024-05-30 NOTE — PROGRESS NOTES
[facility]  Progress Note    Noemy Barrios Patient Status:  Inpatient    1931 MRN [id number]   Location [facility] 4NW-A Attending Patria Smith MD   Hosp Day # 4 PCP Andrew Montilla MD     Subjective:  The patient is resting comfortably in bed.  She states she has not been feeling well.  She states she has a headache, sore throat, and abdominal discomfort.  She denies nausea or vomiting.  She denies passing flatus or having a bowel movement.  She plans to ambulate later today.    Objective/Physical Exam:  General: Alert, orientated x3.  Cooperative.  No apparent distress.  Vital Signs:  Blood pressure 94/68, pulse 97, temperature 97.7 °F (36.5 °C), temperature source Oral, resp. rate 16, weight 143 lb 4.8 oz (65 kg), SpO2 91%.  HEENT: Normocephalic, atraumatic. No scleral icterus.  Abdomen:  Soft, non-distended, appropriately tender, with no rebound or guarding.  No peritoneal signs.  Incision: Dry, clean, and intact with skin glue in place.  There is healing ecchymosis surrounding the incision sites. No surrounding erythema or drainage. No signs of infection.    Labs:  CBC:    Lab Results   Component Value Date    WBC 8.8 2024    WBC 6.3 2024    WBC 6.5 2024     Lab Results   Component Value Date    HGB 13.3 2024    HGB 12.5 2024    HGB 12.2 2024      Lab Results   Component Value Date    .0 2024    .0 2024    .0 2024         Assessment/Plan:  Patient Active Problem List   Diagnosis    Chest pain    Dyspnea    GERD (gastroesophageal reflux disease)    HTN (hypertension)    Urinary tract infection without hematuria, site unspecified    ACP (advance care planning)    Biliary colic    Choledocholithiasis     POD 1 laparoscopic cholecystectomy with negative intraoperative cholangiogram    The patient is stable for discharge home from a general surgery standpoint later today.  I discussed her throat discomfort is likely  due to being intubated during surgery.  I advised her it is common to feel abdominal discomfort for about 4-6 weeks after surgery.  Okay to advance diet as tolerated.  Continue IV antibiotics.  Continue pain control and antiemetics as needed.  Encourage ambulation and up to chair.  DVT prophylaxis with Lovenox.  GI prophylaxis with Protonix.  Discharge instructions discussed with the patient.  She is to follow-up with [facility] general [facility] in 2 weeks.    BURTON Gautam  5/30/2024  8:49 AM

## 2024-05-30 NOTE — PLAN OF CARE
Pt is aaox4, denies pain on the lap sites, complained of head ache this morning relieved from tylenol, had nausea this morning relieved with zofran, attempted to walk in the patton with assist but pt declined 2x, up in the chair, will attempt again later.   Problem: SKIN/TISSUE INTEGRITY - ADULT  Goal: Skin integrity remains intact  Description: INTERVENTIONS  - Assess and document risk factors for pressure ulcer development  - Assess and document skin integrity  - Monitor for areas of redness and/or skin breakdown  - Initiate interventions, skin care algorithm/standards of care as needed  Outcome: Progressing     Problem: MUSCULOSKELETAL - ADULT  Goal: Return mobility to safest level of function  Description: INTERVENTIONS:  - Assess patient stability and activity tolerance for standing, transferring and ambulating w/ or w/o assistive devices  - Assist with transfers and ambulation using safe patient handling equipment as needed  - Ensure adequate protection for wounds/incisions during mobilization  - Obtain PT/OT consults as needed  - Advance activity as appropriate  - Communicate ordered activity level and limitations with patient/family  Outcome: Progressing     Problem: GASTROINTESTINAL - ADULT  Goal: Minimal or absence of nausea and vomiting  Description: INTERVENTIONS:  - Maintain adequate hydration with IV or PO as ordered and tolerated  - Nasogastric tube to low intermittent suction as ordered  - Evaluate effectiveness of ordered antiemetic medications  - Provide nonpharmacologic comfort measures as appropriate  - Advance diet as tolerated, if ordered  - Obtain nutritional consult as needed  - Evaluate fluid balance  Outcome: Progressing     Problem: PAIN - ADULT  Goal: Verbalizes/displays adequate comfort level or patient's stated pain goal  Description: INTERVENTIONS:  - Encourage pt to monitor pain and request assistance  - Assess pain using appropriate pain scale  - Administer analgesics based on type and  severity of pain and evaluate response  - Implement non-pharmacological measures as appropriate and evaluate response  - Consider cultural and social influences on pain and pain management  - Manage/alleviate anxiety  - Utilize distraction and/or relaxation techniques  - Monitor for opioid side effects  - Notify MD/LIP if interventions unsuccessful or patient reports new pain  - Anticipate increased pain with activity and pre-medicate as appropriate  Outcome: Progressing

## 2024-05-30 NOTE — PAYOR COMM NOTE
--------------  CONTINUED STAY REVIEW    Payor: [employer]  Subscriber #:  [id number]  Authorization Number: [id number]    Admit date: 5/26/24  Admit time:  3:05 AM    5/28/24  Temp:  [97.6 °F (36.4 °C)-98.5 °F (36.9 °C)] 98.5 °F (36.9 °C)  Pulse:  [] 92  Resp:  [16] 16  BP: (120-184)/(64-91) 154/66  SpO2:  [94 %-95 %] 95 %     Physical Exam:    General: No acute distress  Respiratory: No wheezes, no rhonchi  Cardiovascular: S1, S2, regular rate and rhythm  Abdomen: Soft, Non-tender, non-distended, positive bowel sounds  Neuro: No new focal deficits.   Extremities: No edema        Diagnostic Data:    Labs:        Recent Labs   Lab 05/25/24 2233 05/26/24  1353 05/27/24  0540   WBC 8.1 8.3 6.5   HGB 13.7 13.6 12.2   MCV 91.7 92.7 94.4   .0 264.0 232.0               Recent Labs   Lab 05/25/24 2233 05/26/24  1353 05/27/24  0540   * 143* 120*   BUN 10 5* 2*   CREATSERUM 0.75 0.68 0.67   CA 9.0 9.2 8.7   ALB 3.5 3.7 3.2*   * 137 139   K 4.0 3.9 4.2    106 108   CO2 23.0 25.0 26.0   ALKPHO 67 56 46*   AST 22 19 21   ALT 14 12* 11*   BILT 0.3 0.4 0.3   TP 7.3 7.6 6.6               Hospital Encounter on 05/25/24   1. Urine Culture, Routine     Status: Abnormal     Collection Time: 05/26/24 12:50 AM     Specimen: Urine, clean catch   Result Value Ref Range     Urine Culture >100,000 CFU/ML Enterobacter cloacae complex (A) N/A       Susceptibility     Enterobacter cloacae complex -  (no method available)       Cefazolin >=64 Resistant         Cefepime <=1 Sensitive         Ceftriaxone <=1 Sensitive         Ciprofloxacin <=0.25 Sensitive         Gentamicin <=1 Sensitive         Meropenem <=0.25 Sensitive         Levofloxacin <=0.12 Sensitive         Nitrofurantoin 64 Intermediate         Piperacillin + Tazobactam <=4 Sensitive         Trimethoprim/Sulfa <=20 Sensitive              Imaging: Reviewed in Epic.     Medications:    pantoprazole  40 mg Intravenous Daily    enoxaparin   40 mg Subcutaneous Daily    levothyroxine  50 mcg Oral Before breakfast    venlafaxine ER  37.5 mg Oral Daily    losartan  50 mg Oral Daily    cefTRIAXone  1 g Intravenous Q24H         Assessment & Plan:       #Bilary colic  -US with distended GB w/o cholecystitis and choledocholithiasis  -Pain control  -MRI showing cholelithiasis and no choledocholithiasis  -HIDA completed, results pending  -lap christie in am  -Pt is a moderate risk for a low risk procedure for a cardiac event based on age and grade 1 diastolic dysfunction.     #UTI- enterobacter  -continue Rocephin d3     #HTN  -Cont. ARB     #Anxiety/depression  -Cont home meds     #Hypothyroid  -Synthroid      5/29/24  Reason for Consultation:   RUQ Abdominal pain     Subjective:   Plan for OR for lap ccy today   Denies any overt GI bleeding  Still with RUQ discomfort     /75 (BP Location: Right arm)   Pulse 101   Temp 97.7 °F (36.5 °C) (Oral)   Resp 16   Wt 143 lb 4.8 oz (65 kg)   SpO2 93%   BMI 24.60 kg/m²   Body mass index is 24.6 kg/m².     Abd: Soft, RUQ mildly -tender, non-distended. No rebound tenderness, no guarding.   Neuro: Aox3.      Lab 05/25/24  2233 05/26/24  1353 05/27/24  0540 05/29/24  0652   WBC 8.1 8.3 6.5  --    HGB 13.7 13.6 12.2  --    MCV 91.7 92.7 94.4  --    .0 264.0 232.0 252.0   Medications:    pantoprazole  40 mg Intravenous Daily    enoxaparin  40 mg Subcutaneous Daily    levothyroxine  50 mcg Oral Before breakfast    venlafaxine ER  37.5 mg Oral Daily    losartan  50 mg Oral Daily    cefTRIAXone  1 g Intravenous Q24H         Allergies:  No Known Allergies     Imaging:  I have personally reviewed all pertinent available imaging.        ASSESSMENT / PLAN:      Noemy Barrios is a 89 year old female with GI consult for RUQ abdominal pain, which is improved this am. MRCP most recently shows cholelithiasis, without any CBD stone (reviewed prior US - CBD stone may have passed).   MRCP Dilated CBD. Lipase wnl.   HIDA  scan noted     Recommendations:   General surgery on consult - plan for OR - lap CCY later today   Will follow up, pending results of IOC - consider role for any GI intervention   DVT ppx  IVF, analgesia, anti-emetics per primary team       OPERATIVE NOTE           Noemy Barrios Location: OR   CSN [id number] MRN [id number]   Admission Date 5/25/2024 Operation Date 5/29/2024   Attending Physician Patria Smith MD Operating Physician Emily Mae MD      PREOPERATIVE DIAGNOSIS  Symptomatic cholelithiasis  Choledocholithiasis     POSTOPERATIVE DIAGNOSIS  Same     PROCEDURE PERFORMED  Laparoscopic cholecystectomy with ICG guidance  Transversus abdominis plane block  Intra-operative cholangiogram     INDICATIONS              This is a 89-year-old woman with a history of choledocholithiasis status post ERCP and stent placement 5 years ago at [facility].  She presented to the hospital with right upper quadrant abdominal pain.  Ultrasound imaging demonstrated a dilated gallbladder with cholelithiasis as well as dilated CBD with filling defects.  Patient underwent MRCP which was negative for choledocholithiasis.  She also had HIDA scan which was negative for acute cholecystitis.  Patient continued to have right upper quadrant abdominal pain as well as nausea.  Cholecystectomy was indicated.     FINDINGS  Omental adhesions as well as adhesions to the stomach and duodenum taken down sharply, distended gallbladder without any inflammation, cystic and common bile duct identified via ICG guidance, cholangiogram showed dilated cystic as well as common bile duct but no filling defects or changes at the ampulla with good flow of contrast into the duodenum        MEDICATIONS ADMINISTERED IN LAST 1 DAY:  acetaminophen (Tylenol Extra Strength) tab 1,000 mg       Date Action Dose Route User    5/30/2024 0840 Given 1,000 mg Oral Zully Tavares RN          amLODIPine (Norvasc) tab 5 mg       Date Action Dose Route User     5/30/2024 0823 Given 5 mg Oral Zully Tavares YONISEDGARD       cefTRIAXone (Rocephin) 1 g in D5W 100 mL IVPB-ADD       Date Action Dose Route User    5/30/2024 0047 New Bag 1 g Intravenous Capri Sifuentes RN     dextrose in lactated ringers 5% infusion       Date Action Dose Route User    5/30/2024 0626 New Bag 100 mL/hr Intravenous Capri Sifuentes RN          enoxaparin (Lovenox) 40 MG/0.4ML SUBQ injection 40 mg       Date Action Dose Route User    5/30/2024 0818 Given 40 mg Subcutaneous (Left Lower Abdomen) Zully Tavares RN       hydrALAzine (Apresoline) 20 mg/mL injection 5 mg       Date Action Dose Route User    5/29/2024 2127 Given 5 mg Intravenous Capri Sifuentes RN       labetalol (Trandate) 5 mg/mL injection 10 mg       Date Action Dose Route User    5/30/2024 0024 Given 10 mg Intravenous Capri Sifuentes RN    5/29/2024 1908 Given 10 mg Intravenous Nida Melissa RN    5/29/2024 1259 Given 10 mg Intravenous Grace Grover RN          labetalol (Trandate) 5 mg/mL injection       Date Action Dose Route User    5/29/2024 1908 Given 10 mg Intravenous Nida Melisas RN     levothyroxine (Synthroid) tab 50 mcg       Date Action Dose Route User    5/30/2024 0626 Given 50 mcg Oral Capri Sifuentes RN     losartan (Cozaar) tab 100 mg       Date Action Dose Route User    5/30/2024 0823 Given 100 mg Oral Zully Tavares RN          morphINE PF 2 MG/ML injection 1 mg       Date Action Dose Route User    5/29/2024 1347 Given 1 mg Intravenous Grace Grover RN          morphINE PF 2 MG/ML injection 2 mg       Date Action Dose Route User    5/29/2024 2134 Given 2 mg Intravenous Capri Sifuentes RN          morphINE PF 4 MG/ML injection 4 mg       Date Action Dose Route User    5/30/2024 0019 Given 4 mg Intravenous Capri Sifuentes RN          ondansetron (Zofran) 4 MG/2ML injection 4 mg       Date Action Dose Route User    5/30/2024 0840 Given 4 mg Intravenous Zully Tavares RN     5/29/2024 1347 Given 4 mg Intravenous Siapno, Grace Mejias, RN        Date Action Dose Route User    5/30/2024 0817 Given 40 mg Intravenous Zully Tavares RN     sodium chloride 0.9% infusion       Date Action Dose Route User    5/30/2024 0817 New Bag (none) Intravenous Zully Tavares, EDGARD     venlafaxine ER (Effexor-XR) 24 hr cap 37.5 mg       Date Action Dose Route User    5/30/2024 0840 Given 37.5 mg Oral Zully Tavares RN       Vitals (last day)       Date/Time Temp Pulse Resp BP SpO2 Weight O2 Device O2 Flow Rate (L/min) [facility]    05/30/24 0445 97.7 °F (36.5 °C) 75 16 94/68 95 % -- -- --     05/30/24 0010 97.4 °F (36.3 °C) 75 15 171/81 96 % -- -- --     05/29/24 2223 98 °F (36.7 °C) 91 18 162/78 95 % -- Nasal cannula 2 L/min     05/29/24 1943 98.1 °F (36.7 °C) -- -- -- 94 % -- -- 3 L/min     05/29/24 1940 -- 79 20 170/86 93 % -- Nasal cannula 3 L/min     05/29/24 1935 -- 78 23 172/93 -- -- -- --     05/29/24 1920 -- 81 28 168/90 -- -- -- --     05/29/24 1905 97.8 °F (36.6 °C) 83 17 176/90 95 % -- Nasal cannula 3 L/min     05/29/24 1850 -- 85 23 167/87 92 % -- Nasal cannula 3 L/min     05/29/24 1845 -- 88 21 160/90 91 % -- -- --     05/29/24 1840 -- 88 21 165/91 91 % -- Nasal cannula 2 L/min     05/29/24 1835 97.3 °F (36.3 °C) 90 18 162/89 87 % -- None (Room air) --     05/29/24 1526 -- -- -- 159/99 -- -- -- -- AS    05/29/24 1341 -- 76 -- 171/80 -- -- -- -- AS    05/29/24 1249 -- -- -- 204/143 -- -- -- -- LV    05/29/24 0830 -- 101 -- -- 93 % -- -- -- LV    05/29/24 0335 97.7 °F (36.5 °C) 98 16 138/75 90 % -- None (Room air) -- RS

## 2024-05-31 ENCOUNTER — TELEPHONE (OUTPATIENT)
Facility: LOCATION | Age: 89
End: 2024-05-31

## 2024-05-31 VITALS
OXYGEN SATURATION: 93 % | WEIGHT: 143.31 LBS | DIASTOLIC BLOOD PRESSURE: 71 MMHG | SYSTOLIC BLOOD PRESSURE: 133 MMHG | HEART RATE: 96 BPM | TEMPERATURE: 98 F | BODY MASS INDEX: 25 KG/M2 | RESPIRATION RATE: 18 BRPM

## 2024-05-31 PROCEDURE — 99239 HOSP IP/OBS DSCHRG MGMT >30: CPT | Performed by: INTERNAL MEDICINE

## 2024-05-31 RX ORDER — POLYETHYLENE GLYCOL 3350 17 G/17G
17 POWDER, FOR SOLUTION ORAL DAILY PRN
Qty: 30 EACH | Refills: 0 | Status: SHIPPED | OUTPATIENT
Start: 2024-05-31

## 2024-05-31 RX ORDER — CIPROFLOXACIN 500 MG/1
500 TABLET, FILM COATED ORAL
Status: DISCONTINUED | OUTPATIENT
Start: 2024-05-31 | End: 2024-05-31

## 2024-05-31 RX ORDER — VALSARTAN 80 MG/1
160 TABLET ORAL DAILY
Status: SHIPPED | COMMUNITY
Start: 2024-05-31

## 2024-05-31 RX ORDER — ONDANSETRON 4 MG/1
4 TABLET, ORALLY DISINTEGRATING ORAL EVERY 8 HOURS PRN
Qty: 10 TABLET | Refills: 0 | Status: SHIPPED | OUTPATIENT
Start: 2024-05-31 | End: 2024-05-31

## 2024-05-31 RX ORDER — TRAMADOL HYDROCHLORIDE 50 MG/1
50 TABLET ORAL EVERY 8 HOURS PRN
Qty: 10 TABLET | Refills: 0 | Status: SHIPPED | OUTPATIENT
Start: 2024-05-31 | End: 2024-05-31

## 2024-05-31 RX ORDER — TRAMADOL HYDROCHLORIDE 50 MG/1
50 TABLET ORAL EVERY 8 HOURS PRN
Qty: 10 TABLET | Refills: 0 | Status: SHIPPED | OUTPATIENT
Start: 2024-05-31

## 2024-05-31 NOTE — TELEPHONE ENCOUNTER
Spoke with Pati at pharmacy to clarify per VÍCTOR Navarro PA-C the the tramadol should be 50 mg every 8 hours as needed.  Verbalized understanding.

## 2024-05-31 NOTE — PLAN OF CARE
Patient reporting pain to abdomen at surgical site. Patient medicated for pain on a PRN basis relief reported. Patient denies nausea. Bed locked and in lowest position call light within reach.   Problem: SKIN/TISSUE INTEGRITY - ADULT  Goal: Skin integrity remains intact  Description: INTERVENTIONS  - Assess and document risk factors for pressure ulcer development  - Assess and document skin integrity  - Monitor for areas of redness and/or skin breakdown  - Initiate interventions, skin care algorithm/standards of care as needed  Outcome: Progressing     Problem: MUSCULOSKELETAL - ADULT  Goal: Return mobility to safest level of function  Description: INTERVENTIONS:  - Assess patient stability and activity tolerance for standing, transferring and ambulating w/ or w/o assistive devices  - Assist with transfers and ambulation using safe patient handling equipment as needed  - Ensure adequate protection for wounds/incisions during mobilization  - Obtain PT/OT consults as needed  - Advance activity as appropriate  - Communicate ordered activity level and limitations with patient/family  Outcome: Progressing     Problem: GASTROINTESTINAL - ADULT  Goal: Minimal or absence of nausea and vomiting  Description: INTERVENTIONS:  - Maintain adequate hydration with IV or PO as ordered and tolerated  - Nasogastric tube to low intermittent suction as ordered  - Evaluate effectiveness of ordered antiemetic medications  - Provide nonpharmacologic comfort measures as appropriate  - Advance diet as tolerated, if ordered  - Obtain nutritional consult as needed  - Evaluate fluid balance  Outcome: Progressing     Problem: PAIN - ADULT  Goal: Verbalizes/displays adequate comfort level or patient's stated pain goal  Description: INTERVENTIONS:  - Encourage pt to monitor pain and request assistance  - Assess pain using appropriate pain scale  - Administer analgesics based on type and severity of pain and evaluate response  - Implement  non-pharmacological measures as appropriate and evaluate response  - Consider cultural and social influences on pain and pain management  - Manage/alleviate anxiety  - Utilize distraction and/or relaxation techniques  - Monitor for opioid side effects  - Notify MD/LIP if interventions unsuccessful or patient reports new pain  - Anticipate increased pain with activity and pre-medicate as appropriate  Outcome: Progressing

## 2024-05-31 NOTE — SPIRITUAL CARE NOTE
Spiritual Care Visit Note    Patient Name: Noemy Barrios Date of Spiritual Care Visit: 24   : 1931 Primary Dx: Urinary tract infection without hematuria, site unspecified       Referred By: Referral From: Other (Comment)    Spiritual Care Taxonomy:    Intended Effects: Build relationship of care and support    Methods: Encourage sharing of feelings    Interventions: Silent prayer;Acknowledge current situation;Ask questions to bring forth feelings;Explain  role    Visit Type/Summary:     - Spiritual Care: Consulted with RN prior to visit. Patient and family expressed appreciation for  visit.  remains available as needed for follow up.    Spiritual Care support can be requested via an Epic consult. For urgent/immediate needs, please contact the On Call  at: Edward: ext [phone]

## 2024-05-31 NOTE — PROGRESS NOTES
[facility]  Progress Note    Noemy Barrios Patient Status:  Inpatient    1931 MRN [id number]   Location [facility] 4NW-A Attending Patria Smith MD   Hosp Day # 5 PCP Andrew Montilla MD     Subjective:  She is seen and examined resting in bed. She reports expected incisional soreness. She reports tolerating diet without nausea or vomiting. She reports ambulating without difficulty. She reports no other concerns.     Objective/Physical Exam:  /71 (BP Location: Right arm)   Pulse 96   Temp 97.9 °F (36.6 °C) (Oral)   Resp 18   Wt 143 lb 4.8 oz   SpO2 93%   BMI 24.60 kg/m²     Intake/Output Summary (Last 24 hours) at 2024 1314  Last data filed at 2024 0200  Gross per 24 hour   Intake 1195 ml   Output --   Net 1195 ml         General: Awake, Alert,  Cooperative.  No apparent distress.  Pulm: no respiratory distress, no increased work of breathing  Abdomen:  Soft, non-distended,appropriate incisional tenderness with no rebound or guarding.  No peritoneal signs.  Incision:  Clean, dry, intact without erythema.  Dermabond in place.        Labs:      Lab Results   Component Value Date    PT 14.8 (H) 2014    INR 1.20 (H) 2014               Assessment:  Patient Active Problem List   Diagnosis    Chest pain    Dyspnea    GERD (gastroesophageal reflux disease)    HTN (hypertension)    Urinary tract infection without hematuria, site unspecified    ACP (advance care planning)    Biliary colic    Choledocholithiasis       POD 2 laparoscopic cholecystectomy     Plan:  She is stable for discharge home today from a general surgery standpoint  Discharge instructions discussed  Follow up with [facility] general surgery in 2 weeks, sooner if needed  Sharon Navarro PA-C  2024  1:14 PM

## 2024-05-31 NOTE — TELEPHONE ENCOUNTER
[facility] pharmacy called to clarify directions for medication traMADol 50 MG Oral Tab.    Please advise   # [phone]

## 2024-05-31 NOTE — PLAN OF CARE
Patient alert oriented times four, on room air, patient denies pain to lap sites. Ambulates to bathroom and gait steady, uses walker.

## 2024-05-31 NOTE — DISCHARGE SUMMARY
[facility]     Noemy Barrios Patient Status:  Inpatient    1931 MRN [id number]   Location [facility] 4NW-A Attending Patria Smith MD   Hosp Day # 5 PCP Andrew Montilla MD     Date of Admission: 2024  Date of Discharge:   24    Discharge Disposition: Home or Self Care    Discharge Diagnosis:  #Bilary colic with choledocholithiasis  - sp lap cholecystectomy with negative IOC 24     #UTI- enterobacter  -completed abx      #HTN  -Cont. ARB (inc dose )      #Anxiety/depression     #Hypothyroid    History of Present Illness: Noemy Barrios is a 89 year old female with a past medical history of anxiety and depression and hypertension.  She comes to the emergency room now secondary to abdominal pain.  Pain is located in the right upper quadrant.  Denies any fevers, chills or vomiting.  In the emergency room she had an abdominal ultrasound that shows choledocholithiasis.       Brief Synopsis: patient admitted and per imaging result proceeded to lap cholecystectomy per surgery. She has done well post op and her nausea resolved and able to tolerate diet now. She completed abx for her UTI.     Lace+ Score: 58  59-90 High Risk  29-58 Medium Risk  0-28   Low Risk       TCM Follow-Up Recommendation:  LACE > 58: High Risk of readmission after discharge from the hospital.      Consultants:  GI, surgery    Discharge Medication List:     Discharge Medications        START taking these medications        Instructions Prescription details   ondansetron 4 MG Tbdp  Commonly known as: Zofran-ODT      Take 1 tablet (4 mg total) by mouth every 8 (eight) hours as needed for Nausea.   Quantity: 10 tablet  Refills: 0     Polyethylene Glycol 3350 17 g Pack  Commonly known as: MIRALAX      Take 17 g by mouth daily as needed.   Quantity: 30 each  Refills: 0     traMADol 50 MG Tabs  Commonly known as: Ultram      Take 1 tablet (50 mg total) by mouth every 8 (eight) hours as  needed for Pain. 1 tablet by mouth every 4-6 hours as needed for pain.   Quantity: 10 tablet  Refills: 0            CHANGE how you take these medications        Instructions Prescription details   valsartan 80 MG Tabs  Commonly known as: Diovan  What changed: how much to take      Take 2 tablets (160 mg total) by mouth daily.   Refills: 0            CONTINUE taking these medications        Instructions Prescription details   ALPRAZolam 0.25 MG Tabs  Commonly known as: Xanax      Take 1 tablet (0.25 mg total) by mouth 2 (two) times daily as needed for Anxiety.   Refills: 0     denosumab 60 MG/ML Sosy  Commonly known as: Prolia      Inject 1 mL (60 mg total) into the skin one time.   Refills: 0     levothyroxine 75 MCG Tabs  Commonly known as: Synthroid      Take 50 mcg by mouth before breakfast.   Refills: 0     Venlafaxine HCl ER 75 MG Tb24  Commonly known as: EFFEXOR      Take 0.5 tablets (37.5 mg total) by mouth daily.   Refills: 0            STOP taking these medications      aspirin 81 MG Tabs        cyanocobalamin 500 MCG Tabs  Commonly known as: Vitamin B12        metoprolol tartrate 25 MG Tabs  Commonly known as: Lopressor        Vitamin D3 250 MCG (70450 UT) Caps                  Where to Get Your Medications        These medications were sent to [place], [place] - [address] [place] [address], [facility] [address] [phone], [phone]  [address] [place] [id number]      Phone: [phone]   ondansetron 4 MG Tbdp  Polyethylene Glycol 3350 17 g Pack       Please  your prescriptions at the location directed by your doctor or nurse    Bring a paper prescription for each of these medications  traMADol 50 MG Tabs         [place]    Follow-up appointment:   [facility]  [address] [facility] [id number] [phone]    Schedule an appointment as soon as possible for a visit in 2 week(s)      Emily Mae MD  [address] [facility]  [id number]  [phone]    Follow up  As needed    Appointments for Next 30 Days 2024 - 2024      None            Vital signs:  Temp:  [97.9 °F (36.6 °C)-98.3 °F (36.8 °C)] 97.9 °F (36.6 °C)  Pulse:  [96-99] 96  Resp:  [16-18] 18  BP: (133-134)/(69-71) 133/71  SpO2:  [93 %-94 %] 93 %    Physical Exam:    General: No acute distress   Lungs: clear to auscultation  Cardiovascular: S1, S2  Abdomen: Soft NTND    -----------------------------------------------------------------------------------------------  PATIENT DISCHARGE INSTRUCTIONS: See electronic chart    Patria Smith MD    Total time spent on discharge plannin minutes     The  [employer] Cures Act makes medical notes like these available to patients in the interest of transparency. Please be advised this is a medical document. Medical documents are intended to carry relevant information, facts as evident, and the clinical opinion of the practitioner. The medical note is intended as peer to peer communication and may appear blunt or direct. It is written in medical language and may contain abbreviations or verbiage that are unfamiliar.

## 2024-05-31 NOTE — CM/SW NOTE
SW spoke to pt, reports she is medically cleared and her son will transport her back to [facility].    Nurses please call report to:      [facility] [phone]    SW/CM to remain available for dc planning, and/or additional need for support.    Bradley Martinez, GIAN  Discharge Planner  [phone]

## 2024-06-01 NOTE — PLAN OF CARE
Patient discharged, left hospital per wheelchair, left with family. Denied having any questions concerning discharge meds.Papers sent to facilty in regards to admission.

## 2024-06-18 ENCOUNTER — OFFICE VISIT (OUTPATIENT)
Facility: LOCATION | Age: 89
End: 2024-06-18

## 2024-06-18 VITALS — HEART RATE: 72 BPM | OXYGEN SATURATION: 97 % | TEMPERATURE: 97 F

## 2024-06-18 DIAGNOSIS — Z90.49 S/P LAPAROSCOPIC CHOLECYSTECTOMY: ICD-10-CM

## 2024-06-18 DIAGNOSIS — R10.11 RUQ PAIN: Primary | ICD-10-CM

## 2024-06-18 PROCEDURE — 99024 POSTOP FOLLOW-UP VISIT: CPT | Performed by: STUDENT IN AN ORGANIZED HEALTH CARE EDUCATION/TRAINING PROGRAM

## 2024-06-18 PROCEDURE — 1159F MED LIST DOCD IN RCRD: CPT | Performed by: STUDENT IN AN ORGANIZED HEALTH CARE EDUCATION/TRAINING PROGRAM

## 2024-06-18 PROCEDURE — 1160F RVW MEDS BY RX/DR IN RCRD: CPT | Performed by: STUDENT IN AN ORGANIZED HEALTH CARE EDUCATION/TRAINING PROGRAM

## 2024-06-18 PROCEDURE — 1170F FXNL STATUS ASSESSED: CPT | Performed by: STUDENT IN AN ORGANIZED HEALTH CARE EDUCATION/TRAINING PROGRAM

## 2024-06-18 NOTE — PROGRESS NOTES
Patient ID: Noemy Barrios is a 89 year old female.    Chief Complaint   Patient presents with    Post-Op     PO - LAPAROSCOPIC CHOLECYSTECTOMY WITH ICG AND CHOLANGIOGRAM 5/29/24, painful, soreness, discomfort of right side abdominal area, no other symptoms.       HPI: Noemy Barrios is a 89 year old female presents to clinic for evaluation.  Patient is status post laparoscopic cholecystectomy on 5/29/2024.  Since then, she has been doing well.  Last night, she began to feel severe right upper quadrant abdominal pain.  She took some Tylenol and ice the area and reports her pain is improved today.  She reports having associated nausea but denies any vomiting.  She had 1 other episode during her recovery.  Otherwise, she denies any fevers or chills.  She is tolerating diet and having normal bowel function.  She is ambulating well and hoping to get back to physical therapy in her chair exercises.    Workup:   Pathology  Cholecystectomy:  -Chronic cholecystitis and cholelithiasis.      Past Medical History  Past Medical History:    Depression    Hearing impairment    Psychiatric disorder    ANXIETY    Thyroid disease    Unspecified essential hypertension    Visual impairment       Past Surgical History  Past Surgical History:   Procedure Laterality Date    Colonoscopy         Medications  Current Outpatient Medications   Medication Sig Dispense Refill    valsartan 80 MG Oral Tab Take 2 tablets (160 mg total) by mouth daily.      Polyethylene Glycol 3350 17 g Oral Powd Pack Take 17 g by mouth daily as needed. 30 each 0    traMADol 50 MG Oral Tab Take 1 tablet (50 mg total) by mouth every 8 (eight) hours as needed for Pain. 1 tablet by mouth every 4-6 hours as needed for pain. 10 tablet 0    ALPRAZolam 0.25 MG Oral Tab Take 1 tablet (0.25 mg total) by mouth 2 (two) times daily as needed for Anxiety.      denosumab 60 MG/ML Subcutaneous Solution Prefilled Syringe Inject 1 mL (60 mg total) into the skin one time.       Venlafaxine HCl ER 75 MG Oral Tablet 24 Hr Take 0.5 tablets (37.5 mg total) by mouth daily.      Levothyroxine Sodium (SYNTHROID, LEVOTHROID) 75 MCG Oral Tab Take 50 mcg by mouth before breakfast.         Allergies  Allergies   Allergen Reactions    Seasonal OTHER (SEE COMMENTS)     Congestion        Social History  History   Smoking Status    Never   Smokeless Tobacco    Never     History   Alcohol Use    Yes     Comment: VERY RARE     History   Drug Use Not on file       Family History  History reviewed. No pertinent family history.    Review of Systems  Review of Systems   Constitutional: Negative.    Respiratory: Negative.     Cardiovascular: Negative.    Gastrointestinal:  Positive for abdominal pain and nausea. Negative for abdominal distention, constipation and vomiting.       Exam  Vitals:    06/18/24 1003   Pulse: 72   Temp: 97.4 °F (36.3 °C)     Physical Exam  Constitutional:       Appearance: Normal appearance.   Cardiovascular:      Rate and Rhythm: Normal rate.   Pulmonary:      Effort: Pulmonary effort is normal.   Abdominal:      General: Abdomen is flat. There is no distension.      Palpations: Abdomen is soft.      Tenderness: There is no abdominal tenderness.      Comments: Surgical incisions healing well, no erythema or drainage, some Dermabond remains   Skin:     General: Skin is warm and dry.   Neurological:      Mental Status: She is alert and oriented to person, place, and time.           Assessment/Plan  Assessment   Problem List Items Addressed This Visit    None  Visit Diagnoses       RUQ pain    -  Primary    Relevant Orders    US ABDOMEN COMPLETE (CPT=76700)    S/P laparoscopic cholecystectomy        Relevant Orders    US ABDOMEN COMPLETE (CPT=76700)            Noemy Barrios is a 89 year old female status post laparoscopic cholecystectomy now with right upper quadrant/flank pain    Overall, patient is doing well from a surgical standpoint  She is tolerating diet and having  normal bowel function  She does report recent episode of right-sided abdominal pain  She has a history of kidney stones  Differential includes a kidney stone versus musculoskeletal in nature  Will obtain ultrasound of the abdomen to rule out any other etiologies  Patient can continue to take her Tylenol and icing the area as needed  Patient can begin doing her physical therapy but I encouraged her to wait 1 more week before doing her chair exercises  Once ultrasound is done, I will reach out to patient with results  If she has worsening symptoms she is call my office to be evaluated sooner  Otherwise, patient can follow-up as needed    Thank you for letting me participate in the care of this patient      Emily Mae MD  General Surgery  [facility]     CC:  Andrew Montilla MD

## 2024-06-20 ENCOUNTER — TELEPHONE (OUTPATIENT)
Facility: LOCATION | Age: 89
End: 2024-06-20

## 2024-06-20 NOTE — TELEPHONE ENCOUNTER
Patient's daughter called to request ultrasound order be faxed to [facility].   Faxed to [phone]

## 2024-09-30 ENCOUNTER — APPOINTMENT (OUTPATIENT)
Dept: GENERAL RADIOLOGY | Facility: HOSPITAL | Age: 89
End: 2024-09-30
Attending: EMERGENCY MEDICINE
Payer: MEDICARE

## 2024-09-30 ENCOUNTER — HOSPITAL ENCOUNTER (OUTPATIENT)
Facility: HOSPITAL | Age: 89
Setting detail: OBSERVATION
Discharge: HOME | End: 2024-10-03
Attending: EMERGENCY MEDICINE | Admitting: HOSPITALIST
Payer: MEDICARE

## 2024-09-30 DIAGNOSIS — N39.0 URINARY TRACT INFECTION WITHOUT HEMATURIA, SITE UNSPECIFIED: Primary | ICD-10-CM

## 2024-09-30 DIAGNOSIS — R53.1 WEAKNESS GENERALIZED: ICD-10-CM

## 2024-09-30 LAB
ALBUMIN SERPL-MCNC: 4.4 G/DL (ref 3.2–4.8)
ALBUMIN/GLOB SERPL: 1.2 {RATIO} (ref 1–2)
ALP LIVER SERPL-CCNC: 43 U/L
ALT SERPL-CCNC: 7 U/L
ANION GAP SERPL CALC-SCNC: 5 MMOL/L (ref 0–18)
AST SERPL-CCNC: 29 U/L (ref ?–34)
ATRIAL RATE: 90 BPM
BASOPHILS # BLD AUTO: 0.05 X10(3) UL (ref 0–0.2)
BASOPHILS NFR BLD AUTO: 1.1 %
BILIRUB SERPL-MCNC: 0.5 MG/DL (ref 0.2–0.9)
BILIRUB UR QL STRIP.AUTO: NEGATIVE
BUN BLD-MCNC: 8 MG/DL (ref 9–23)
CALCIUM BLD-MCNC: 9.7 MG/DL (ref 8.7–10.4)
CHLORIDE SERPL-SCNC: 105 MMOL/L (ref 98–112)
CLARITY UR REFRACT.AUTO: CLEAR
CO2 SERPL-SCNC: 23 MMOL/L (ref 21–32)
COLOR UR AUTO: COLORLESS
CREAT BLD-MCNC: 0.72 MG/DL
EGFRCR SERPLBLD CKD-EPI 2021: 78 ML/MIN/1.73M2 (ref 60–?)
EOSINOPHIL # BLD AUTO: 0.14 X10(3) UL (ref 0–0.7)
EOSINOPHIL NFR BLD AUTO: 3 %
ERYTHROCYTE [DISTWIDTH] IN BLOOD BY AUTOMATED COUNT: 13.2 %
GLOBULIN PLAS-MCNC: 3.6 G/DL (ref 2–3.5)
GLUCOSE BLD-MCNC: 104 MG/DL (ref 70–99)
GLUCOSE UR STRIP.AUTO-MCNC: NORMAL MG/DL
HCT VFR BLD AUTO: 41.3 %
HGB BLD-MCNC: 14.5 G/DL
IMM GRANULOCYTES # BLD AUTO: 0.01 X10(3) UL (ref 0–1)
IMM GRANULOCYTES NFR BLD: 0.2 %
KETONES UR STRIP.AUTO-MCNC: NEGATIVE MG/DL
LACTATE SERPL-SCNC: 1.2 MMOL/L (ref 0.5–2)
LEUKOCYTE ESTERASE UR QL STRIP.AUTO: 250
LYMPHOCYTES # BLD AUTO: 1.5 X10(3) UL (ref 1–4)
LYMPHOCYTES NFR BLD AUTO: 32.5 %
MCH RBC QN AUTO: 30.9 PG (ref 26–34)
MCHC RBC AUTO-ENTMCNC: 35.1 G/DL (ref 31–37)
MCV RBC AUTO: 88.1 FL
MONOCYTES # BLD AUTO: 0.38 X10(3) UL (ref 0.1–1)
MONOCYTES NFR BLD AUTO: 8.2 %
NEUTROPHILS # BLD AUTO: 2.53 X10 (3) UL (ref 1.5–7.7)
NEUTROPHILS # BLD AUTO: 2.53 X10(3) UL (ref 1.5–7.7)
NEUTROPHILS NFR BLD AUTO: 55 %
NITRITE UR QL STRIP.AUTO: NEGATIVE
OSMOLALITY SERPL CALC.SUM OF ELEC: 275 MOSM/KG (ref 275–295)
P AXIS: 79 DEGREES
P-R INTERVAL: 182 MS
PH UR STRIP.AUTO: 7 [PH] (ref 5–8)
PLATELET # BLD AUTO: 252 10(3)UL (ref 150–450)
POTASSIUM SERPL-SCNC: 5.2 MMOL/L (ref 3.5–5.1)
PROT SERPL-MCNC: 8 G/DL (ref 5.7–8.2)
PROT UR STRIP.AUTO-MCNC: NEGATIVE MG/DL
Q-T INTERVAL: 354 MS
QRS DURATION: 78 MS
QTC CALCULATION (BEZET): 433 MS
R AXIS: -41 DEGREES
RBC # BLD AUTO: 4.69 X10(6)UL
RBC UR QL AUTO: NEGATIVE
SARS-COV-2 RNA RESP QL NAA+PROBE: NOT DETECTED
SODIUM SERPL-SCNC: 133 MMOL/L (ref 136–145)
SP GR UR STRIP.AUTO: 1.01 (ref 1–1.03)
T AXIS: 38 DEGREES
TROPONIN I SERPL HS-MCNC: 3 NG/L
UROBILINOGEN UR STRIP.AUTO-MCNC: NORMAL MG/DL
VENTRICULAR RATE: 90 BPM
WBC # BLD AUTO: 4.6 X10(3) UL (ref 4–11)

## 2024-09-30 PROCEDURE — 99223 1ST HOSP IP/OBS HIGH 75: CPT | Performed by: HOSPITALIST

## 2024-09-30 PROCEDURE — 71045 X-RAY EXAM CHEST 1 VIEW: CPT | Performed by: EMERGENCY MEDICINE

## 2024-09-30 RX ORDER — ECHINACEA PURPUREA EXTRACT 125 MG
1 TABLET ORAL
Status: DISCONTINUED | OUTPATIENT
Start: 2024-09-30 | End: 2024-10-03

## 2024-09-30 RX ORDER — LEVOTHYROXINE SODIUM 50 UG/1
50 TABLET ORAL
COMMUNITY

## 2024-09-30 RX ORDER — METOCLOPRAMIDE HYDROCHLORIDE 5 MG/ML
10 INJECTION INTRAMUSCULAR; INTRAVENOUS EVERY 8 HOURS PRN
Status: DISCONTINUED | OUTPATIENT
Start: 2024-09-30 | End: 2024-10-03

## 2024-09-30 RX ORDER — VENLAFAXINE HYDROCHLORIDE 37.5 MG/1
75 CAPSULE, EXTENDED RELEASE ORAL DAILY
Status: DISCONTINUED | OUTPATIENT
Start: 2024-09-30 | End: 2024-10-03

## 2024-09-30 RX ORDER — ENOXAPARIN SODIUM 100 MG/ML
40 INJECTION SUBCUTANEOUS DAILY
Status: DISCONTINUED | OUTPATIENT
Start: 2024-09-30 | End: 2024-10-03

## 2024-09-30 RX ORDER — ALPRAZOLAM 0.25 MG
0.25 TABLET ORAL 2 TIMES DAILY PRN
Status: DISCONTINUED | OUTPATIENT
Start: 2024-09-30 | End: 2024-09-30

## 2024-09-30 RX ORDER — SODIUM CHLORIDE 9 MG/ML
INJECTION, SOLUTION INTRAVENOUS CONTINUOUS
Status: DISCONTINUED | OUTPATIENT
Start: 2024-09-30 | End: 2024-09-30

## 2024-09-30 RX ORDER — ACETAMINOPHEN 500 MG
TABLET ORAL
Status: COMPLETED
Start: 2024-09-30 | End: 2024-09-30

## 2024-09-30 RX ORDER — LOSARTAN POTASSIUM 50 MG/1
50 TABLET ORAL DAILY
Status: DISCONTINUED | OUTPATIENT
Start: 2024-09-30 | End: 2024-10-03

## 2024-09-30 RX ORDER — TRAMADOL HYDROCHLORIDE 50 MG/1
50 TABLET ORAL EVERY 6 HOURS PRN
Status: DISCONTINUED | OUTPATIENT
Start: 2024-09-30 | End: 2024-09-30

## 2024-09-30 RX ORDER — MELATONIN
3 NIGHTLY PRN
Status: DISCONTINUED | OUTPATIENT
Start: 2024-09-30 | End: 2024-10-03

## 2024-09-30 RX ORDER — POLYETHYLENE GLYCOL 3350 17 G/17G
17 POWDER, FOR SOLUTION ORAL DAILY PRN
Status: DISCONTINUED | OUTPATIENT
Start: 2024-09-30 | End: 2024-10-03

## 2024-09-30 RX ORDER — ONDANSETRON 2 MG/ML
4 INJECTION INTRAMUSCULAR; INTRAVENOUS EVERY 6 HOURS PRN
Status: DISCONTINUED | OUTPATIENT
Start: 2024-09-30 | End: 2024-10-03

## 2024-09-30 RX ORDER — TRAMADOL HYDROCHLORIDE 50 MG/1
50 TABLET ORAL EVERY 6 HOURS PRN
Status: ON HOLD | COMMUNITY
End: 2024-10-03 | Stop reason: CLARIF

## 2024-09-30 RX ORDER — ONDANSETRON 4 MG/1
4 TABLET, FILM COATED ORAL EVERY 8 HOURS PRN
COMMUNITY

## 2024-09-30 RX ORDER — LEVOTHYROXINE SODIUM 50 UG/1
50 TABLET ORAL
Status: DISCONTINUED | OUTPATIENT
Start: 2024-10-01 | End: 2024-10-03

## 2024-09-30 RX ORDER — ACETAMINOPHEN 500 MG
500 TABLET ORAL EVERY 4 HOURS PRN
Status: DISCONTINUED | OUTPATIENT
Start: 2024-09-30 | End: 2024-10-03

## 2024-09-30 NOTE — ED QUICK NOTES
Orders for admission, patient is aware of plan and ready to go upstairs. Any questions, please call ED RN Rojas at extension [phone].     Patient Covid vaccination status: Unvaccinated     COVID Test Ordered in ED: Rapid SARS-CoV-2 by PCR    COVID Suspicion at Admission: N/A    Running Infusions:  None    Mental Status/LOC at time of transport: A/OX4    Other pertinent information:   CIWA score: N/A   NIH score:  N/A

## 2024-09-30 NOTE — PLAN OF CARE
A&Ox4.  VSS.  O2 in low 90s on RA.  C/o SOB, back pain, HA.  Ambulates with walker to bathroom.    Problem: RESPIRATORY - ADULT  Goal: Achieves optimal ventilation and oxygenation  Description: INTERVENTIONS:  - Assess for changes in respiratory status  - Assess for changes in mentation and behavior  - Position to facilitate oxygenation and minimize respiratory effort  - Oxygen supplementation based on oxygen saturation or ABGs  - Provide Smoking Cessation handout, if applicable  - Encourage broncho-pulmonary hygiene including cough, deep breathe, Incentive Spirometry  - Assess the need for suctioning and perform as needed  - Assess and instruct to report SOB or any respiratory difficulty  - Respiratory Therapy support as indicated  - Manage/alleviate anxiety  - Monitor for signs/symptoms of CO2 retention  Outcome: Progressing     Problem: SAFETY ADULT - FALL  Goal: Free from fall injury  Description: INTERVENTIONS:  - Assess pt frequently for physical needs  - Identify cognitive and physical deficits and behaviors that affect risk of falls.  - [place] fall precautions as indicated by assessment.  - Educate pt/family on patient safety including physical limitations  - Instruct pt to call for assistance with activity based on assessment  - Modify environment to reduce risk of injury  - Provide assistive devices as appropriate  - Consider OT/PT consult to assist with strengthening/mobility  - Encourage toileting schedule  Outcome: Progressing     Problem: GENITOURINARY - ADULT  Goal: Absence of urinary retention  Description: INTERVENTIONS:  - Assess patient’s ability to void and empty bladder  - Monitor intake/output and perform bladder scan as needed  - Follow urinary retention protocol/standard of care  - Consider collaborating with pharmacy to review patient's medication profile  - Implement strategies to promote bladder emptying  Outcome: Progressing

## 2024-09-30 NOTE — H&P
[facility]     Noemy Barrios Patient Status:  Observation    1931 MRN [id number]   Location [facility] 5NW-A Attending Rj Palomino MD   Hosp Day # 0 PCP Andrew Montilla MD     Chief Complaint: SOB, UTI    Subjective:    History of Present Illness:     Noemy Barrios is a 89 year old female with past medical history significant for hypertension, hypothyroidism, anxiety/depression presents to the ER with complaint of generalized weakness and fatigue.  Patient states her symptoms started suddenly yesterday.  She complained of worsening fatigue.  She denies fever, chills, nausea, vomiting, urgency, frequency, hematuria, or abdominal pain.  In the ER, she had mild shortness of breath as well.  At this time, she is sitting comfortably in her room.  She just had lunch.  She denies dyspnea or chest pain.  In the ER, UA was consistent with a UTI.    History/Other:    Past Medical History:  Past Medical History:    Anxiety state    Hearing impairment    High blood pressure    Psychiatric disorder    ANXIETY    Thyroid disease    Unspecified essential hypertension    Visual impairment     Past Surgical History:   Past Surgical History:   Procedure Laterality Date    Colonoscopy        Family History:   No family history on file.  Social History:    reports that she has never smoked. She has never used smokeless tobacco. She reports current alcohol use. She reports that she does not use drugs.     Allergies:   Allergies   Allergen Reactions    Seasonal OTHER (SEE COMMENTS)     Congestion        Medications:    No current facility-administered medications on file prior to encounter.     Current Outpatient Medications on File Prior to Encounter   Medication Sig Dispense Refill    levothyroxine 50 MCG Oral Tab Take 1 tablet (50 mcg total) by mouth before breakfast.      valsartan 80 MG Oral Tab Take 2 tablets (160 mg total) by mouth daily.      Venlafaxine HCl ER 75 MG Oral  Tablet 24 Hr Take 1 tablet (75 mg total) by mouth daily.      traMADol 50 MG Oral Tab Take 1 tablet (50 mg total) by mouth every 6 (six) hours as needed for Pain.      ondansetron (ZOFRAN) 4 mg tablet Take 1 tablet (4 mg total) by mouth every 8 (eight) hours as needed for Nausea.      Polyethylene Glycol 3350 17 g Oral Powd Pack Take 17 g by mouth daily as needed. 30 each 0    ALPRAZolam 0.25 MG Oral Tab Take 1 tablet (0.25 mg total) by mouth 2 (two) times daily as needed for Anxiety.      denosumab 60 MG/ML Subcutaneous Solution Prefilled Syringe Inject 1 mL (60 mg total) into the skin every 6 (six) months.         Review of Systems:   A comprehensive review of systems was completed.    Pertinent positives and negatives noted in the HPI.    Objective:   Physical Exam:    BP (!) 142/91 (BP Location: Left arm)   Pulse 91   Temp 97.5 °F (36.4 °C) (Oral)   Resp 18   Wt 147 lb (66.7 kg)   SpO2 95%   BMI 25.23 kg/m²   General: No acute distress, Alert  Respiratory: No rhonchi, no wheezes  Cardiovascular: S1, S2. Regular rate and rhythm  Abdomen: Soft, Non-tender, non-distended, positive bowel sounds  Neuro: No new focal deficits  Extremities: No edema      Results:    Labs:      Labs Last 24 Hours:    Recent Labs   Lab 09/30/24  0828   RBC 4.69   HGB 14.5   HCT 41.3   MCV 88.1   MCH 30.9   MCHC 35.1   RDW 13.2   NEPRELIM 2.53   WBC 4.6   .0       Recent Labs   Lab 09/30/24  0828   *   BUN 8*   CREATSERUM 0.72   EGFRCR 78   CA 9.7   ALB 4.4   *   K 5.2*      CO2 23.0   ALKPHO 43*   AST 29   ALT 7*   BILT 0.5   TP 8.0       Lab Results   Component Value Date    PT 14.8 (H) 07/01/2014    INR 1.20 (H) 07/01/2014       Recent Labs   Lab 09/30/24  0828   TROPHS 3       No results for input(s): \"TROP\", \"PBNP\" in the last 168 hours.    No results for input(s): \"PCT\" in the last 168 hours.    Imaging: Imaging data reviewed in Epic.    Assessment & Plan:      #UTI, IV abx, f/u UCx    #SOB, unclear  etiology  CXR reviewed  WBC wnl, afebrile  Consider D dimer and CTA to r/o PE if elevated    #HTN, controlled    #DL, statin    #Hypothyroid, Synthroid    #Anxiety/depression, home meds resumed    #Hyponatremia, gentle IVF, f/u BMP in am        Plan of care discussed with pt and RN.    Rj Palomino MD    Supplementary Documentation:     The 21st Century Cures Act makes medical notes like these available to patients in the interest of transparency. Please be advised this is a medical document. Medical documents are intended to carry relevant information, facts as evident, and the clinical opinion of the practitioner. The medical note is intended as peer to peer communication and may appear blunt or direct. It is written in medical language and may contain abbreviations or verbiage that are unfamiliar.               **Certification      PHYSICIAN Certification of Need for Inpatient Hospitalization - Initial Certification    Patient will require inpatient services that will reasonably be expected to span two midnight's based on the clinical documentation in H+P.   Based on patients current state of illness, I anticipate that, after discharge, patient will require TBD.

## 2024-09-30 NOTE — PLAN OF CARE
NURSING ADMISSION NOTE      Patient admitted via AMBULANCE  Oriented to room.  Safety precautions initiated.  Bed in low position.  Call light in reach.    Patient on RA.  Tele and pulse ox on.  C/o back ache, HA, SOB.  Upper & lower dentures in-place in mouth.

## 2024-09-30 NOTE — ED PROVIDER NOTES
Patient Seen in: [facility] Emergency Department      History     Chief Complaint   Patient presents with    Cough/URI     Stated Complaint: Cold symptoms and nausea that started yesterday. DIALLO today with some wheezing.    Subjective:   HPI    89-year-old female presents reporting general fatigue with shortness of breath.  Symptoms began 1 day ago.  Said she spent most of the day yesterday in bed due to fatigue.  She does report eating a couple of small meals yesterday.  Today she said she found she is even more short of breath.  Denies significant cough.  No vomiting or diarrhea.  No chest or abdominal pain.  Denies fever.    Objective:   Past Medical History:    Depression    Hearing impairment    Psychiatric disorder    ANXIETY    Thyroid disease    Unspecified essential hypertension    Visual impairment              Past Surgical History:   Procedure Laterality Date    Colonoscopy                  Social History     Socioeconomic History    Marital status:    Tobacco Use    Smoking status: Never    Smokeless tobacco: Never   Substance and Sexual Activity    Alcohol use: Yes     Comment: VERY RARE     Social Determinants of Health     Food Insecurity: No Food Insecurity (5/26/2024)    Food Insecurity     Food Insecurity: Never true   Transportation Needs: No Transportation Needs (5/26/2024)    Transportation Needs     Lack of Transportation: No   Housing Stability: Low Risk  (5/26/2024)    Housing Stability     Housing Instability: No              Review of Systems    Positive for stated Chief Complaint: Cough/URI    Other systems are as noted in HPI.  Constitutional and vital signs reviewed.      All other systems reviewed and negative except as noted above.    Physical Exam     ED Triage Vitals [09/30/24 0822]   BP (!) 154/101   Pulse 85   Resp 20   Temp 97.3 °F (36.3 °C)   Temp src Temporal   SpO2 100 %   O2 Device None (Room air)       Current Vitals:   Vital Signs  BP: (!) 147/91  Pulse:  86  Resp: 24  Temp: 97.3 °F (36.3 °C)  Temp src: Temporal  MAP (mmHg): (!) 108    Oxygen Therapy  SpO2: 91 %  O2 Device: None (Room air)            Physical Exam    General:  Vitals as listed.  Elderly and frail  HEENT: Sclerae anicteric.  Conjunctivae show no pallor.  Oropharynx clear, mucous membranes moist   Neck: supple, no rigidity   Lungs: Tachypneic.  Conversationally dyspneic.  Good air exchange and clear   Heart: regular rate rhythm and no murmur   Abdomen: Soft and nontender.  No abdominal masses.  No peritoneal signs   Extremities: no edema, normal peripheral pulses   Neuro: Alert oriented and nonfocal   Skin: no rashes or nodules    ED Course     Labs Reviewed   COMP METABOLIC PANEL (14) - Abnormal; Notable for the following components:       Result Value    Glucose 104 (*)     Sodium 133 (*)     Potassium 5.2 (*)     BUN 8 (*)     ALT 7 (*)     Alkaline Phosphatase 43 (*)     Globulin  3.6 (*)     All other components within normal limits   URINALYSIS, ROUTINE - Abnormal; Notable for the following components:    Urine Color Colorless (*)     Leukocyte Esterase Urine 250 (*)     WBC Urine 11-20 (*)     Bacteria Urine Rare (*)     Squamous Epi. Cells Few (*)     All other components within normal limits   TROPONIN I HIGH SENSITIVITY - Normal   RAPID SARS-COV-2 BY PCR - Normal   CBC WITH DIFFERENTIAL WITH PLATELET   LACTIC ACID, PLASMA   RAINBOW DRAW BLUE   BLOOD CULTURE   BLOOD CULTURE     EKG    Rate, intervals and axes as noted on EKG Report.  Rate: 90  Rhythm: Sinus Rhythm  Reading: No ST elevation MI                 XR CHEST AP PORTABLE  (CPT=71045)    Result Date: 9/30/2024  PROCEDURE:  XR CHEST AP PORTABLE  (CPT=71045)  TECHNIQUE:  AP chest radiograph was obtained.  COMPARISON:  [facility] , XR, XR CHEST PA + LAT CHEST (CPT=71046), 10/29/2020, 10:55 AM.  [facility] , XR, XR CHEST AP PORTABLE  (CPT=71045), 1/07/2020, 10:55 AM.  INDICATIONS:  Cold symptoms and nausea that started yesterday. DIALLO today with  some wheezing.  PATIENT STATED HISTORY: (As transcribed by Technologist)  Patient states she has trouble breathing for a week.    FINDINGS:  Cardiac size and pulmonary vasculature are within normal limits. No pleural effusions. No pneumothorax.  Atheromatous calcifications of the aorta.  Minimal left basilar opacity.             CONCLUSION:  1. Hyperexpansion of the lungs. 2.  Minimal left basilar opacity which may represent scarring versus atelectasis.   LOCATION:  [facility]      Dictated by (CST): Veronica Tanner MD on 9/30/2024 at 10:17 AM     Finalized by (CST): Veronica Tanner MD on 9/30/2024 at 10:18 AM               MDM      89-year-old female presents with shortness of breath.  She reports symptoms began a day ago but are worse today.  Denies any cough or fevers.  No vomiting or diarrhea.  On examination she is tachypneic and conversationally dyspneic    Differential includes but is not limited to viral URI, bacterial pneumonia, metabolic disturbance, anemia, cardiac ischemia, a life threat.    CBC, CMP, COVID, troponin, chest x-ray, urinalysis ordered for further evaluation.    My independent interpretation of chest x-ray is that there is no pneumothorax.    Radiology reports \"opacity which may represent scarring versus atelectasis\".    The patient is dyspneic but has no acute findings on chest x-ray.  She is afebrile.  No leukocytosis.  Urinalysis raises concern for a UTI.  Lungs are clear.  Will treat for urinary tract infection.  89-year-old female with UTI and general weakness.  Will admit for IV antibiotics and further management.  Discussed with admitting physician.      Admission disposition: 9/30/2024 11:13 AM                                        Medical Decision Making      Disposition and Plan     Clinical Impression:  1. Urinary tract infection without hematuria, site unspecified    2. Weakness generalized         Disposition:  Admit  9/30/2024 11:13 am    Follow-up:  No follow-up provider  specified.        Medications Prescribed:  Current Discharge Medication List                            Hospital Problems       Present on Admission  Date Reviewed: 6/18/2024            ICD-10-CM Noted POA    * (Principal) Urinary tract infection without hematuria, site unspecified N39.0 5/26/2024 Unknown

## 2024-10-01 PROCEDURE — 99232 SBSQ HOSP IP/OBS MODERATE 35: CPT | Performed by: HOSPITALIST

## 2024-10-01 NOTE — PLAN OF CARE
[facility] plan [facility]  Aox4  VSS on RA/2L noc  afebrile  NSR/ST on tele, receiving Lovenox  Electrolyte Cardiac replacement  continent  Up SBA w/ walker, bed alarm on and call light within reach  Regular diet  Receiving IV Rocephin  Pt updated on current POC, no questions at this time    Problem: RESPIRATORY - ADULT  Goal: Achieves optimal ventilation and oxygenation  Description: INTERVENTIONS:  - Assess for changes in respiratory status  - Assess for changes in mentation and behavior  - Position to facilitate oxygenation and minimize respiratory effort  - Oxygen supplementation based on oxygen saturation or ABGs  - Provide Smoking Cessation handout, if applicable  - Encourage broncho-pulmonary hygiene including cough, deep breathe, Incentive Spirometry  - Assess the need for suctioning and perform as needed  - Assess and instruct to report SOB or any respiratory difficulty  - Respiratory Therapy support as indicated  - Manage/alleviate anxiety  - Monitor for signs/symptoms of CO2 retention  Outcome: Progressing     Problem: SAFETY ADULT - FALL  Goal: Free from fall injury  Description: INTERVENTIONS:  - Assess pt frequently for physical needs  - Identify cognitive and physical deficits and behaviors that affect risk of falls.  - [place] fall precautions as indicated by assessment.  - Educate pt/family on patient safety including physical limitations  - Instruct pt to call for assistance with activity based on assessment  - Modify environment to reduce risk of injury  - Provide assistive devices as appropriate  - Consider OT/PT consult to assist with strengthening/mobility  - Encourage toileting schedule  Outcome: Progressing     Problem: GENITOURINARY - ADULT  Goal: Absence of urinary retention  Description: INTERVENTIONS:  - Assess patient’s ability to void and empty bladder  - Monitor intake/output and perform bladder scan as needed  - Follow urinary retention protocol/standard of care  -  Consider collaborating with pharmacy to review patient's medication profile  - Implement strategies to promote bladder emptying  Outcome: Progressing     Problem: Patient/Family Goals  Goal: Patient/Family Long Term Goal  Description: Patient's Long Term Goal: DC    Interventions:  - IV abx  - See additional Care Plan goals for specific interventions  Outcome: Progressing  Goal: Patient/Family Short Term Goal  Description: Patient's Short Term Goal: Sleep    Interventions:   - Cluster care  - See additional Care Plan goals for specific interventions  Outcome: Progressing

## 2024-10-01 NOTE — PLAN OF CARE
Pt A&Ox4. Currently on RA. On tele NSR/ST with ambulation. Lovenox. IV ABX. Up stand by with walker. Tolerating regular diet. Pt updated on POC. Call light within reach. No further needs at this time.     Problem: RESPIRATORY - ADULT  Goal: Achieves optimal ventilation and oxygenation  Description: INTERVENTIONS:  - Assess for changes in respiratory status  - Assess for changes in mentation and behavior  - Position to facilitate oxygenation and minimize respiratory effort  - Oxygen supplementation based on oxygen saturation or ABGs  - Provide Smoking Cessation handout, if applicable  - Encourage broncho-pulmonary hygiene including cough, deep breathe, Incentive Spirometry  - Assess the need for suctioning and perform as needed  - Assess and instruct to report SOB or any respiratory difficulty  - Respiratory Therapy support as indicated  - Manage/alleviate anxiety  - Monitor for signs/symptoms of CO2 retention  Outcome: Progressing     Problem: SAFETY ADULT - FALL  Goal: Free from fall injury  Description: INTERVENTIONS:  - Assess pt frequently for physical needs  - Identify cognitive and physical deficits and behaviors that affect risk of falls.  - [place] fall precautions as indicated by assessment.  - Educate pt/family on patient safety including physical limitations  - Instruct pt to call for assistance with activity based on assessment  - Modify environment to reduce risk of injury  - Provide assistive devices as appropriate  - Consider OT/PT consult to assist with strengthening/mobility  - Encourage toileting schedule  Outcome: Progressing     Problem: GENITOURINARY - ADULT  Goal: Absence of urinary retention  Description: INTERVENTIONS:  - Assess patient’s ability to void and empty bladder  - Monitor intake/output and perform bladder scan as needed  - Follow urinary retention protocol/standard of care  - Consider collaborating with pharmacy to review patient's medication profile  - Implement strategies to  promote bladder emptying  Outcome: Progressing     Problem: Patient/Family Goals  Goal: Patient/Family Long Term Goal  Description: Patient's Long Term Goal: DC    Interventions:  - IV abx  - See additional Care Plan goals for specific interventions  Outcome: Progressing  Goal: Patient/Family Short Term Goal  Description: Patient's Short Term Goal: Sleep    Interventions:   - Cluster care  - See additional Care Plan goals for specific interventions  Outcome: Progressing

## 2024-10-01 NOTE — CM/SW NOTE
10/01/24 1100   CM/SW Referral Data   Referral Source Social Work (self-referral)   Reason for Referral Discharge planning   Informant Clinical Staff Member;EMR   Patient Info   Patient's Home Environment Assisted Living   Post Acute Care Provider Upon Admission [facility]   Discharge Needs   Anticipated D/C needs No anticipated discharge needs     Patient is a 89 y/o female who admitted with Urinary tract infection without hematuria.   Spoke with Liz from [facility], she is agreeable with patient returning. She requests printed paper RX for any new medications sent with patient. She requested RN give report as well.    [facility] [address]  [phone]    SW/CM to remain available for support and/or discharge planning.    GIAN David  Discharge Planner  [phone]

## 2024-10-01 NOTE — PROGRESS NOTES
[facility]   part of [facility]     Hospitalist Progress Note     Noemy Barrios Patient Status:  Observation    1931 MRN [id number]   Location [facility] 5NW-A Attending Rj Palomino MD   Hosp Day # 0 PCP Andrew Montilla MD     Chief Complaint: Weakness    Subjective:     Patient feels better today, eager to go home.    Objective:    Review of Systems:   A comprehensive review of systems was completed; pertinent positive and negatives stated in subjective.    Vital signs:  Temp:  [97.5 °F (36.4 °C)-98.2 °F (36.8 °C)] 98.2 °F (36.8 °C)  Pulse:  [] 86  Resp:  [12-33] 18  BP: (115-154)/(62-91) 115/62  SpO2:  [86 %-100 %] 97 %    Physical Exam:    General: No acute distress  Respiratory: No wheezes, no rhonchi  Cardiovascular: S1, S2, regular rate and rhythm  Abdomen: Soft, Non-tender, non-distended, positive bowel sounds  Neuro: No new focal deficits.   Extremities: No edema      Diagnostic Data:    Labs:  Recent Labs   Lab 24  0828   WBC 4.6   HGB 14.5   MCV 88.1   .0       Recent Labs   Lab 24  0828   *   BUN 8*   CREATSERUM 0.72   CA 9.7   ALB 4.4   *   K 5.2*      CO2 23.0   ALKPHO 43*   AST 29   ALT 7*   BILT 0.5   TP 8.0       Estimated Creatinine Clearance: 42.2 mL/min (based on SCr of 0.72 mg/dL).    Recent Labs   Lab 24  0828   TROPHS 3       No results for input(s): \"PTP\", \"INR\" in the last 168 hours.               Microbiology    No results found for this visit on 24.      Imaging: Reviewed in Epic.    Medications:    levothyroxine  50 mcg Oral Before breakfast    losartan  50 mg Oral Daily    venlafaxine ER  75 mg Oral Daily    enoxaparin  40 mg Subcutaneous Daily    cefTRIAXone  1 g Intravenous Q24H       Assessment & Plan:      #UTI, IV abx, f/u UCx, no culture results available, will add on now and possibly dc on empiric abx with f/u     #SOB, unclear etiology  CXR reviewed  WBC wnl, afebrile  Consider D dimer and CTA to  r/o PE if elevated    #HTN, controlled     #DL, statin     #Hypothyroid, Synthroid    #Anxiety/depression, home meds resumed    #Hyponatremia, gentle IVF, f/u BMP in am         Rj Palomino MD    Supplementary Documentation:     Quality:  DVT Mechanical Prophylaxis:   SCDs,    DVT Pharmacologic Prophylaxis   Medication    enoxaparin (Lovenox) 40 MG/0.4ML SUBQ injection 40 mg                Code Status: DNAR/Selective Treatment  Sargent: No urinary catheter in place  Sargent Duration (in days):   Central line:    EUNICE:     Discharge is dependent on: progress  At this point Ms. Barrios is expected to be discharge to: home    The 21st Century Cures Act makes medical notes like these available to patients in the interest of transparency. Please be advised this is a medical document. Medical documents are intended to carry relevant information, facts as evident, and the clinical opinion of the practitioner. The medical note is intended as peer to peer communication and may appear blunt or direct. It is written in medical language and may contain abbreviations or verbiage that are unfamiliar.

## 2024-10-02 PROBLEM — E03.9 HYPOTHYROIDISM: Status: ACTIVE | Noted: 2024-10-02

## 2024-10-02 PROBLEM — E87.5 HYPERKALEMIA: Status: ACTIVE | Noted: 2024-10-02

## 2024-10-02 LAB
ANION GAP SERPL CALC-SCNC: 1 MMOL/L (ref 0–18)
BASOPHILS # BLD AUTO: 0.04 X10(3) UL (ref 0–0.2)
BASOPHILS NFR BLD AUTO: 0.6 %
BUN BLD-MCNC: 12 MG/DL (ref 9–23)
CALCIUM BLD-MCNC: 10.4 MG/DL (ref 8.7–10.4)
CHLORIDE SERPL-SCNC: 104 MMOL/L (ref 98–112)
CO2 SERPL-SCNC: 26 MMOL/L (ref 21–32)
CREAT BLD-MCNC: 0.71 MG/DL
D DIMER PPP FEU-MCNC: 1.97 UG/ML FEU (ref ?–0.93)
EGFRCR SERPLBLD CKD-EPI 2021: 79 ML/MIN/1.73M2 (ref 60–?)
EOSINOPHIL # BLD AUTO: 0.26 X10(3) UL (ref 0–0.7)
EOSINOPHIL NFR BLD AUTO: 3.9 %
ERYTHROCYTE [DISTWIDTH] IN BLOOD BY AUTOMATED COUNT: 13.4 %
GLUCOSE BLD-MCNC: 107 MG/DL (ref 70–99)
HCT VFR BLD AUTO: 40.9 %
HGB BLD-MCNC: 13.9 G/DL
IMM GRANULOCYTES # BLD AUTO: 0.02 X10(3) UL (ref 0–1)
IMM GRANULOCYTES NFR BLD: 0.3 %
LYMPHOCYTES # BLD AUTO: 1.59 X10(3) UL (ref 1–4)
LYMPHOCYTES NFR BLD AUTO: 24 %
MCH RBC QN AUTO: 30.8 PG (ref 26–34)
MCHC RBC AUTO-ENTMCNC: 34 G/DL (ref 31–37)
MCV RBC AUTO: 90.7 FL
MONOCYTES # BLD AUTO: 0.74 X10(3) UL (ref 0.1–1)
MONOCYTES NFR BLD AUTO: 11.2 %
NEUTROPHILS # BLD AUTO: 3.97 X10 (3) UL (ref 1.5–7.7)
NEUTROPHILS # BLD AUTO: 3.97 X10(3) UL (ref 1.5–7.7)
NEUTROPHILS NFR BLD AUTO: 60 %
OSMOLALITY SERPL CALC.SUM OF ELEC: 272 MOSM/KG (ref 275–295)
PLATELET # BLD AUTO: 256 10(3)UL (ref 150–450)
POTASSIUM SERPL-SCNC: 4.2 MMOL/L (ref 3.5–5.1)
RBC # BLD AUTO: 4.51 X10(6)UL
SODIUM SERPL-SCNC: 131 MMOL/L (ref 136–145)
TROPONIN I SERPL HS-MCNC: 4 NG/L
TSI SER-ACNC: 2.81 MIU/ML (ref 0.55–4.78)
WBC # BLD AUTO: 6.6 X10(3) UL (ref 4–11)

## 2024-10-02 PROCEDURE — 99232 SBSQ HOSP IP/OBS MODERATE 35: CPT | Performed by: INTERNAL MEDICINE

## 2024-10-02 RX ORDER — CALCIUM CARBONATE 500 MG/1
500 TABLET, CHEWABLE ORAL 3 TIMES DAILY PRN
Status: DISCONTINUED | OUTPATIENT
Start: 2024-10-02 | End: 2024-10-03

## 2024-10-02 NOTE — PLAN OF CARE
Received pt A&Ox4.  on RA, 2L with sleep. NSR/ST with activity on tele. Lovenox for VTE prophylaxis. Tolerating diet. Voids via BRP. Up SBA and walker. IV abx. Denies Pain. Plan of care continues, no further needs at this time.     Problem: RESPIRATORY - ADULT  Goal: Achieves optimal ventilation and oxygenation  Description: INTERVENTIONS:  - Assess for changes in respiratory status  - Assess for changes in mentation and behavior  - Position to facilitate oxygenation and minimize respiratory effort  - Oxygen supplementation based on oxygen saturation or ABGs  - Provide Smoking Cessation handout, if applicable  - Encourage broncho-pulmonary hygiene including cough, deep breathe, Incentive Spirometry  - Assess the need for suctioning and perform as needed  - Assess and instruct to report SOB or any respiratory difficulty  - Respiratory Therapy support as indicated  - Manage/alleviate anxiety  - Monitor for signs/symptoms of CO2 retention  Outcome: Progressing     Problem: SAFETY ADULT - FALL  Goal: Free from fall injury  Description: INTERVENTIONS:  - Assess pt frequently for physical needs  - Identify cognitive and physical deficits and behaviors that affect risk of falls.  - [place] fall precautions as indicated by assessment.  - Educate pt/family on patient safety including physical limitations  - Instruct pt to call for assistance with activity based on assessment  - Modify environment to reduce risk of injury  - Provide assistive devices as appropriate  - Consider OT/PT consult to assist with strengthening/mobility  - Encourage toileting schedule  Outcome: Progressing     Problem: GENITOURINARY - ADULT  Goal: Absence of urinary retention  Description: INTERVENTIONS:  - Assess patient’s ability to void and empty bladder  - Monitor intake/output and perform bladder scan as needed  - Follow urinary retention protocol/standard of care  - Consider collaborating with pharmacy to review patient's medication  profile  - Implement strategies to promote bladder emptying  Outcome: Progressing     Problem: Patient/Family Goals  Goal: Patient/Family Long Term Goal  Description: Patient's Long Term Goal: DC    Interventions:  - IV abx  - See additional Care Plan goals for specific interventions  Outcome: Progressing  Goal: Patient/Family Short Term Goal  Description: Patient's Short Term Goal: Sleep    Interventions:   - Cluster care  - See additional Care Plan goals for specific interventions  Outcome: Progressing

## 2024-10-02 NOTE — PLAN OF CARE
Pt A&Ox4. Glasses at bedside.  on RA- 2L at night. On tele-NSR/ST during ambulation. Lovenox. IV Rocephin. Tolerating regular diet. Up stand by with walker. Pt updated on POC. Call light within reach. No further needs at this time.     Problem: RESPIRATORY - ADULT  Goal: Achieves optimal ventilation and oxygenation  Description: INTERVENTIONS:  - Assess for changes in respiratory status  - Assess for changes in mentation and behavior  - Position to facilitate oxygenation and minimize respiratory effort  - Oxygen supplementation based on oxygen saturation or ABGs  - Provide Smoking Cessation handout, if applicable  - Encourage broncho-pulmonary hygiene including cough, deep breathe, Incentive Spirometry  - Assess the need for suctioning and perform as needed  - Assess and instruct to report SOB or any respiratory difficulty  - Respiratory Therapy support as indicated  - Manage/alleviate anxiety  - Monitor for signs/symptoms of CO2 retention  Outcome: Progressing     Problem: SAFETY ADULT - FALL  Goal: Free from fall injury  Description: INTERVENTIONS:  - Assess pt frequently for physical needs  - Identify cognitive and physical deficits and behaviors that affect risk of falls.  - [place] fall precautions as indicated by assessment.  - Educate pt/family on patient safety including physical limitations  - Instruct pt to call for assistance with activity based on assessment  - Modify environment to reduce risk of injury  - Provide assistive devices as appropriate  - Consider OT/PT consult to assist with strengthening/mobility  - Encourage toileting schedule  Outcome: Progressing     Problem: GENITOURINARY - ADULT  Goal: Absence of urinary retention  Description: INTERVENTIONS:  - Assess patient’s ability to void and empty bladder  - Monitor intake/output and perform bladder scan as needed  - Follow urinary retention protocol/standard of care  - Consider collaborating with pharmacy to review patient's medication  profile  - Implement strategies to promote bladder emptying  Outcome: Progressing     Problem: Patient/Family Goals  Goal: Patient/Family Long Term Goal  Description: Patient's Long Term Goal: DC    Interventions:  - IV abx  - See additional Care Plan goals for specific interventions  Outcome: Progressing  Goal: Patient/Family Short Term Goal  Description: Patient's Short Term Goal: Sleep    Interventions:   - Cluster care  - See additional Care Plan goals for specific interventions  Outcome: Progressing

## 2024-10-03 ENCOUNTER — APPOINTMENT (OUTPATIENT)
Dept: CT IMAGING | Facility: HOSPITAL | Age: 89
End: 2024-10-03
Attending: INTERNAL MEDICINE
Payer: MEDICARE

## 2024-10-03 ENCOUNTER — APPOINTMENT (OUTPATIENT)
Dept: CV DIAGNOSTICS | Facility: HOSPITAL | Age: 89
End: 2024-10-03
Attending: INTERNAL MEDICINE
Payer: MEDICARE

## 2024-10-03 ENCOUNTER — APPOINTMENT (OUTPATIENT)
Dept: GENERAL RADIOLOGY | Facility: HOSPITAL | Age: 89
End: 2024-10-03
Attending: INTERNAL MEDICINE
Payer: MEDICARE

## 2024-10-03 ENCOUNTER — APPOINTMENT (OUTPATIENT)
Dept: NUCLEAR MEDICINE | Facility: HOSPITAL | Age: 89
End: 2024-10-03
Attending: INTERNAL MEDICINE
Payer: MEDICARE

## 2024-10-03 VITALS
RESPIRATION RATE: 16 BRPM | DIASTOLIC BLOOD PRESSURE: 69 MMHG | HEART RATE: 104 BPM | HEIGHT: 64 IN | WEIGHT: 147 LBS | BODY MASS INDEX: 25.1 KG/M2 | SYSTOLIC BLOOD PRESSURE: 113 MMHG | TEMPERATURE: 98 F | OXYGEN SATURATION: 98 %

## 2024-10-03 PROCEDURE — 71045 X-RAY EXAM CHEST 1 VIEW: CPT | Performed by: INTERNAL MEDICINE

## 2024-10-03 PROCEDURE — 99239 HOSP IP/OBS DSCHRG MGMT >30: CPT | Performed by: INTERNAL MEDICINE

## 2024-10-03 PROCEDURE — 93306 TTE W/DOPPLER COMPLETE: CPT | Performed by: INTERNAL MEDICINE

## 2024-10-03 PROCEDURE — 78582 LUNG VENTILAT&PERFUS IMAGING: CPT | Performed by: INTERNAL MEDICINE

## 2024-10-03 NOTE — DISCHARGE SUMMARY
[facility]  Discharge Summary    Noemy Barrios Patient Status:  Observation    1931 MRN [id number]   Location [facility] 5NW-A Attending No att. providers found   Hosp Day # 0 PCP Andrew Montilla MD     Date of Admission: 2024    Date of Discharge: 10/3/2024      Disposition: Home or Self Care    Discharge Diagnosis:   # Generalized weakness-improved  # no UTI     # Shortness of breath and transient mild hypoxia, possibly due to atelectasis-resolved  CXR done on admission on 2024 with hyperexpansion of the lungs.  Minimal left basilar opacity which may represent scarring versus atelectasis.  WBC wnl, afebrile  D dimer elevated, VQ scan done on 10/3/2024 with no VQ mismatch.  Initial troponin done on 2024 was negative at 3.  EKG done on admission on 2024 showed normal sinus rhythm, nonspecific ST-T wave changes in inferior and anterior leads.  repeat troponin done 10/2/24 negative .  Seen by cardiology in hospital.  2D echo done on 10/3/2024 with EF of 60 to 65%.  No regional wall motion abnormalities.  Weaned off oxygen and on room air at the time of discharge    #HTN     # Dyslipidemia     #Hypothyroid    #Anxiety/depression    # Mild hyponatremia on admission     #Hyperkalemia on admission-resolved    Chief Complaint:   Chief Complaint   Patient presents with    Cough/URI       History of Present Illness:   Noemy Barrios is a 89 year old female with past medical history significant for hypertension, hypothyroidism, anxiety/depression presents to the ER with complaint of generalized weakness and fatigue.  Patient states her symptoms started suddenly yesterday.  She complained of worsening fatigue.  She denies fever, chills, nausea, vomiting, urgency, frequency, hematuria, or abdominal pain.  In the ER, she had mild shortness of breath as well.  At this time, she is sitting comfortably in her room.  She just had lunch.  She denies dyspnea or chest pain.  In the ER, UA  was consistent with a UTI.   Please refer to history and physical done by Dr. Palomino for details of admission    Brief Synopsis:   CXR done on admission on 9/30/2024 with hyperexpansion of the lungs.  Minimal left basilar opacity which may represent scarring versus atelectasis.  WBC wnl, afebrile.  UA positive on admission but it also had squamous epithelial cells.  Initially treated with empiric antibiotic.  Blood culture done on admission came back with no growth.  Urine culture done on admission came back with no growth at 18 to 24 hours.  COVID screening done on admission was negative.  D dimer elevated, VQ scan done on 10/3/2024 with no VQ mismatch.  Initial troponin done on 9/30/2024 was negative at 3.  EKG done on admission on 9/30/2024 showed normal sinus rhythm, nonspecific ST-T wave changes in inferior and anterior leads.  repeat troponin done 10/2/24 negative .  Seen by cardiology in hospital.  2D echo done on 10/3/2024 with EF of 60 to 65%.  No regional wall motion abnormalities.  Weaned off oxygen and on room air at the time of discharge    Patient improved clinically and eager to go home.  Discharged in stable condition.  Follow-up with regular outpatient primary care physician Andrew Montilla MD   within 1 week in office.    TCM Diagnosis at discharge from Hospital: Other: See above; still recommend for TCM follow-up    Lace+ Score: 58  59-90 High Risk  29-58 Medium Risk  0-28   Low Risk.     TCM Follow-Up Recommendation:Noemy Barrios   LACE 29-58: Moderate Risk of readmission after discharge from the hospital.      PCP: Andrew Montilla MD    Consultations:   Consultants         Provider   Role Specialty     Jcarlos Mitchell MD       Consulting Physician Cardiovascular Diseases       Procedures during hospitalization:   None    Incidental or significant findings and recommendations (brief descriptions):  None    Lab/Test results pending at Discharge:   None      Discharge Medications:         Discharge Medications        CONTINUE taking these medications        Instructions Prescription details   levothyroxine 50 MCG Tabs  Commonly known as: Synthroid      Take 1 tablet (50 mcg total) by mouth before breakfast.   Refills: 0     ondansetron 4 mg tablet  Commonly known as: Zofran      Take 1 tablet (4 mg total) by mouth every 8 (eight) hours as needed for Nausea.   Refills: 0     valsartan 80 MG Tabs  Commonly known as: Diovan      Take 2 tablets (160 mg total) by mouth daily.   Refills: 0     Venlafaxine HCl ER 75 MG Tb24  Commonly known as: EFFEXOR      Take 1 tablet (75 mg total) by mouth daily.   Refills: 0               [place] prescription monitoring program data reviewed in epic before prescribing narcotics/controlled substances: Not applicable as no narcotics prescribed    Follow up Visits: Follow-up with Andrew Montilla MD in 1 week    Andrew Montilla MD  [address] [facility] [address] [phone]    Follow up      Gil Stafford MD  [address] [address]  [phone]    Schedule an appointment as soon as possible for a visit  as directed    Appointments for Next 30 Days 10/3/2024 - 11/2/2024      None            Physical Exam:  /69 (BP Location: Left arm)   Pulse 104   Temp 98.4 °F (36.9 °C) (Oral)   Resp 16   Ht 5' 4\" (1.626 m)   Wt 147 lb (66.7 kg)   SpO2 98%   BMI 25.23 kg/m²     General: No acute distress  Respiratory: No wheezes, no rhonchi  Cardiovascular: S1, S2, regular rate and rhythm  Abdomen: Soft, Non-tender, non-distended, positive bowel sounds  Neuro: Awake alert, no new focal deficits.   Extremities: No pedal edema    Discharge Condition: stable    Patient Discharge Instructions: See electronic chart      More than 30 minutes on discharge    Greta Casper MD  10/3/2024  11:58 PM

## 2024-10-03 NOTE — PLAN OF CARE
Patient A&Ox4. Patient resting in bed, no apparent distress. Patient resting on room air. Patient  on tele/, vital sign monitoring. IV ABX  Safety/fall precautions in place. Call light in reach.    CT scan ordered, pending. Patient did request to run this by her son in law who is MD, We spoke to Tod in am and agrees with plan, patient will require new IV appropriate for contrast.     Problem: RESPIRATORY - ADULT  Goal: Achieves optimal ventilation and oxygenation  Description: INTERVENTIONS:  - Assess for changes in respiratory status  - Assess for changes in mentation and behavior  - Position to facilitate oxygenation and minimize respiratory effort  - Oxygen supplementation based on oxygen saturation or ABGs  - Provide Smoking Cessation handout, if applicable  - Encourage broncho-pulmonary hygiene including cough, deep breathe, Incentive Spirometry  - Assess the need for suctioning and perform as needed  - Assess and instruct to report SOB or any respiratory difficulty  - Respiratory Therapy support as indicated  - Manage/alleviate anxiety  - Monitor for signs/symptoms of CO2 retention  Outcome: Progressing     Problem: SAFETY ADULT - FALL  Goal: Free from fall injury  Description: INTERVENTIONS:  - Assess pt frequently for physical needs  - Identify cognitive and physical deficits and behaviors that affect risk of falls.  - [place] fall precautions as indicated by assessment.  - Educate pt/family on patient safety including physical limitations  - Instruct pt to call for assistance with activity based on assessment  - Modify environment to reduce risk of injury  - Provide assistive devices as appropriate  - Consider OT/PT consult to assist with strengthening/mobility  - Encourage toileting schedule  Outcome: Progressing     Problem: GENITOURINARY - ADULT  Goal: Absence of urinary retention  Description: INTERVENTIONS:  - Assess patient’s ability to void and empty bladder  - Monitor intake/output and perform  bladder scan as needed  - Follow urinary retention protocol/standard of care  - Consider collaborating with pharmacy to review patient's medication profile  - Implement strategies to promote bladder emptying  Outcome: Progressing     Problem: Patient/Family Goals  Goal: Patient/Family Long Term Goal  Description: Patient's Long Term Goal: DC    Interventions:  - IV abx  - See additional Care Plan goals for specific interventions  Outcome: Progressing  Goal: Patient/Family Short Term Goal  Description: Patient's Short Term Goal: Sleep  10/2 noc: rest, sleep    Interventions:   - Cluster care  - See additional Care Plan goals for specific interventions  Outcome: Progressing

## 2024-10-03 NOTE — CONSULTS
[facility] Cardiology  Consultation Note      Noemy Barrios Patient Status:  Observation    1931 MRN [id number]   Location [facility] 5NW-A Attending Greta Casper MD   Hosp Day # 0 PCP Andrew Montilla MD     Impression:  1. shortness of breath, unclear cause- r/o cardiac etiology  2. abnormal ECG- SR inferior Q wave pattern  3. HTN  4. HL  5. generalized weakness, presumed UTI    Recommendations:  - SOB: unclear cause, primary cardiac etiology seems less likely.  Appears euvolemic on exam, CXR without gross congestion. ECG shows inferior infarct pattern, which is chronic.  HS troponin negative.  TTE is ordered for today. As long as there are no gross abnormalities, I would not recommend further ischemic testing.  Considering D-dimer is elevated, PE work-up seems appropriate- with IV infiltration, may need to consider alternative imaging, such as VQ scan.    - discussed with pt, RN.        History of Present Illness:  Noemy Barrios is a 89 year old female who presented to [facility] on 2024.    Seen in cardiology consultation for persistent SOB.  Elderly 89 YO F hx of HTN/HL admitted 24 with c/o generalized weakness.  Admission work-up suggestive of UTI, she has been treated with IV ceftriaxone.  Additionally, she complains of SOB, which seems to wax/wane, even prior to admission.  Denies exertional trigger, or associated chest pain.  No PND/orthopnea, LE edema.  ECG shows SR with inferior Q wave pattern, which is unchanged from prior tracings.  Troponin negative.  She was to have a CTA today r/o PE, but IV infiltrated.      Medications:  Current Facility-Administered Medications   Medication Dose Route Frequency    calcium carbonate (Tums) chewable tab 500 mg  500 mg Oral TID PRN    levothyroxine (Synthroid) tab 50 mcg  50 mcg Oral Before breakfast    polyethylene glycol (PEG 3350) (Miralax) 17 g oral packet 17 g  17 g Oral Daily PRN    losartan (Cozaar) tab 50 mg   50 mg Oral Daily    venlafaxine ER (Effexor-XR) 24 hr cap 75 mg  75 mg Oral Daily    acetaminophen (Tylenol Extra Strength) tab 500 mg  500 mg Oral Q4H PRN    melatonin tab 3 mg  3 mg Oral Nightly PRN    glycerin-hypromellose- (Artificial Tears) 0.2-0.2-1 % ophthalmic solution 1 drop  1 drop Both Eyes QID PRN    sodium chloride (Saline Mist) 0.65 % nasal solution 1 spray  1 spray Each Nare Q3H PRN    enoxaparin (Lovenox) 40 MG/0.4ML SUBQ injection 40 mg  40 mg Subcutaneous Daily    ondansetron (Zofran) 4 MG/2ML injection 4 mg  4 mg Intravenous Q6H PRN    metoclopramide (Reglan) 5 mg/mL injection 10 mg  10 mg Intravenous Q8H PRN    cefTRIAXone (Rocephin) 1 g in sodium chloride 0.9% 100 mL IVPB-ADDV  1 g Intravenous Q24H       Past Medical History:    Anxiety state    Hearing impairment    High blood pressure    Psychiatric disorder    ANXIETY    Thyroid disease    Unspecified essential hypertension    Visual impairment       Past Surgical History:   Procedure Laterality Date    Colonoscopy         Family History  family history is not on file.    Social History   reports that she has never smoked. She has never used smokeless tobacco. She reports current alcohol use. She reports that she does not use drugs.     Allergies  Allergies   Allergen Reactions    Seasonal OTHER (SEE COMMENTS)     Congestion          Review of Systems:  Constitutional: negative for fevers  Eyes: negative for visual disturbance  Ears, nose, mouth, throat, and face: negative for epistaxis  Respiratory: negative for dyspnea on exertion  Cardiovascular: negative for chest pain  Gastrointestinal: negative for melena  Genitourinary:negative for hematuria  Hematologic/lymphatic: negative for bleeding  Musculoskeletal:negative for myalgias  Neurological: negative for dizziness and headaches  Endocrine: negative for temperature intolerance      Physical Exam:  Blood pressure (!) 131/108, pulse 107, temperature 98.8 °F (37.1 °C), temperature  source Oral, resp. rate 16, height 5' 4\" (1.626 m), weight 147 lb (66.7 kg), SpO2 98%.  Temp (24hrs), Av.1 °F (36.7 °C), Min:97.8 °F (36.6 °C), Max:98.8 °F (37.1 °C)    Wt Readings from Last 3 Encounters:   24 147 lb (66.7 kg)   24 143 lb 4.8 oz (65 kg)   24 141 lb 5 oz (64.1 kg)       General: Awake and alert; in no acute distress  HEENT: Extraocular movements are intact; sclerae are anicteric; scalp is atrauamatic; no thyromegaly  Neck: Supple; no JVD; no carotid bruits  Cardiac: Regular rate and regular rhythm; no murmurs/rubs/gallops are appreciated; PMI is non-displaced; there is no evidence of a sternal heave  Lungs: Clear to auscultation bilaterally; no accessory muscle use is noted  Abdomen: Soft, non-tender; bowel sounds are normoactive; no hepatosplenomegaly  Extremities: No clubbing or cyanosis; moves all 4 extremities normally  Psychiatric: Normal mood and affect; answers questions appropriately  Dermatologic: No rashes; normal skin turgor    Diagnostic testing:    EKG: Normal sinus rhythm    Labs:   Lab Results   Component Value Date    PT 14.8 (H) 2014    INR 1.20 (H) 2014        Lab Results   Component Value Date    WBC 6.6 10/02/2024    HGB 13.9 10/02/2024    HCT 40.9 10/02/2024    .0 10/02/2024    CREATSERUM 0.71 10/02/2024    BUN 12 10/02/2024     10/02/2024    K 4.2 10/02/2024     10/02/2024    CO2 26.0 10/02/2024     10/02/2024    CA 10.4 10/02/2024    TSH 2.810 10/02/2024    DDIMER 1.97 10/02/2024         Thank you for allowing our practice to participate in the care of your patient. Please do not hesitate to contact me if you have any questions.    Jcarlos Mitchell MD  10/3/2024  10:45 AM

## 2024-10-03 NOTE — PROGRESS NOTES
[facility]   part of [facility]     Hospitalist Progress Note     Noemy Barrios Patient Status:  Observation    1931 MRN [id number]   Location [facility] 5NW-A Attending Rj Palomino MD   Hosp Day # 0 PCP Andrew Montilla MD     Chief Complaint: Shortness of breath, generalized weakness    Subjective:   Denies any chest pain.denies any shortness of breath today.  Afebrile.  Patient sitting up in chair   Denies abdominal pain.  No nausea vomiting.    Objective:    Review of Systems:   A comprehensive review of systems was completed; pertinent positive and negatives stated in subjective.    Vital signs:  Temp:  [97.8 °F (36.6 °C)-98.8 °F (37.1 °C)] 98.8 °F (37.1 °C)  Pulse:  [] 107  Resp:  [16-18] 16  BP: (125-149)/() 131/108  SpO2:  [93 %-98 %] 98 %    Physical Exam:    BP (!) 131/108 (BP Location: Left arm)   Pulse 107   Temp 98.8 °F (37.1 °C) (Oral)   Resp 16   Ht 5' 4\" (1.626 m)   Wt 147 lb (66.7 kg)   SpO2 98%   BMI 25.23 kg/m²   Was on 2 L of oxygen by nasal cannula earlier during the day, weaned off to room air  General: No acute distress  Respiratory: No wheezes, no rhonchi  Cardiovascular: S1, S2, regular rate and rhythm  Abdomen: Soft, Non-tender, non-distended, positive bowel sounds  Neuro: Awake alert, no new focal deficits.   Extremities: No pedal edema      Diagnostic Data:    Labs:  Recent Labs   Lab 09/30/24  0828 10/02/24  1959   WBC 4.6 6.6   HGB 14.5 13.9   MCV 88.1 90.7   .0 256.0       Recent Labs   Lab 09/30/24  0828 10/02/24  1959   * 107*   BUN 8* 12   CREATSERUM 0.72 0.71   CA 9.7 10.4   ALB 4.4  --    * 131*   K 5.2* 4.2    104   CO2 23.0 26.0   ALKPHO 43*  --    AST 29  --    ALT 7*  --    BILT 0.5  --    TP 8.0  --        Estimated Creatinine Clearance: 42.7 mL/min (based on SCr of 0.71 mg/dL).    Recent Labs   Lab 09/30/24  0828 10/02/24  1959   TROPHS 3 4       No results for input(s): \"PTP\", \"INR\" in the last 168  hours.    Lab Results   Component Value Date    TSH 2.810 10/02/2024             Microbiology    Hospital Encounter on 09/30/24   1. Blood Culture     Status: None (Preliminary result)    Collection Time: 09/30/24 11:42 AM    Specimen: Blood,peripheral   Result Value Ref Range    Blood Culture Result No Growth 2 Days N/A   2. Urine Culture, Routine     Status: None    Collection Time: 09/30/24  9:49 AM    Specimen: Urine, clean catch   Result Value Ref Range    Urine Culture No Growth at 18-24 hrs. N/A         Imaging: Reviewed in Epic.    Medications:    levothyroxine  50 mcg Oral Before breakfast    losartan  50 mg Oral Daily    venlafaxine ER  75 mg Oral Daily    enoxaparin  40 mg Subcutaneous Daily    cefTRIAXone  1 g Intravenous Q24H       Assessment & Plan:      # Generalized weakness, no UTI   -dc empiric IV abx, UCx and blood cx with no growth  -TSH normal at 2.810     # Shortness of breath, unclear etiology  CXR done on admission on 9/30/2024 with hyperexpansion of the lungs.  Minimal left basilar opacity which may represent scarring versus atelectasis.  WBC wnl, afebrile  D dimer elevated and CTA to r/o PE pending, if this negative , will plan dc today, d/w RN.  Initial troponin done on 9/30/2024 was negative at 3.  EKG done on admission on 9/30/2024 showed normal sinus rhythm, nonspecific ST-T wave changes in inferior and anterior leads.  repeat troponin done 10/2/24 negative     #HTN, controlled     #DL, statin     #Hypothyroid, Synthroid    #Anxiety/depression, continue home meds    #Hyponatremia on admission  Follow BMP     #Hyperkalemia on admission  -Follow BMP    D dimer elevated and CTA to r/o PE pending, if this negative and if no further cardiac work up planned by cardiology , then will plan dc today, d/w RN. F/u with cardiology as directed,  reg OP PCP Andrew Montilla MD in 1 week in office.    Discussed with patient, RN    Greta Casper MD  10/3/2024        Supplementary Documentation:      Quality:  DVT Mechanical Prophylaxis:   SCDs,    DVT Pharmacologic Prophylaxis   Medication    enoxaparin (Lovenox) 40 MG/0.4ML SUBQ injection 40 mg                Code Status: DNAR/Selective Treatment  Sargent: No urinary catheter in place  Sargent Duration (in days):   Central line:    EUNICE: 10/2/2024    Discharge is dependent on: progress  At this point Ms. Barrios is expected to be discharge to: home    The 21st Century Cures Act makes medical notes like these available to patients in the interest of transparency. Please be advised this is a medical document. Medical documents are intended to carry relevant information, facts as evident, and the clinical opinion of the practitioner. The medical note is intended as peer to peer communication and may appear blunt or direct. It is written in medical language and may contain abbreviations or verbiage that are unfamiliar.

## 2024-10-03 NOTE — PROGRESS NOTES
[facility]   part of [facility]     Hospitalist Progress Note     Noemy Barrios Patient Status:  Observation    1931 MRN [id number]   Location [facility] 5NW-A Attending Rj Palomino MD   Hosp Day # 0 PCP Andrew Montilla MD     Chief Complaint: Shortness of breath, generalized weakness    Subjective:   Denies any chest pain.denies any shortness of breath today.  Afebrile.  Patient feels better today, eager to go home.  Denies any chest pain or shortness of breath.  Denies abdominal pain.  No nausea vomiting.    Objective:    Review of Systems:   A comprehensive review of systems was completed; pertinent positive and negatives stated in subjective.    Vital signs:  Temp:  [97.8 °F (36.6 °C)-98.2 °F (36.8 °C)] 97.8 °F (36.6 °C)  Pulse:  [] 101  Resp:  [18-20] 18  BP: (117-156)/(63-91) 141/84  SpO2:  [92 %-96 %] 93 %    Physical Exam:    /84 (BP Location: Left arm)   Pulse 101   Temp 97.8 °F (36.6 °C) (Oral)   Resp 18   Ht 5' 4\" (1.626 m)   Wt 147 lb (66.7 kg)   SpO2 93%   BMI 25.23 kg/m²   Was on 2 L of oxygen by nasal cannula earlier during the day, weaned off to room air  General: No acute distress  Respiratory: No wheezes, no rhonchi  Cardiovascular: S1, S2, regular rate and rhythm  Abdomen: Soft, Non-tender, non-distended, positive bowel sounds  Neuro: Awake alert, no new focal deficits.   Extremities: No pedal edema      Diagnostic Data:    Labs:  Recent Labs   Lab 24  0828   WBC 4.6   HGB 14.5   MCV 88.1   .0       Recent Labs   Lab 24  0828   *   BUN 8*   CREATSERUM 0.72   CA 9.7   ALB 4.4   *   K 5.2*      CO2 23.0   ALKPHO 43*   AST 29   ALT 7*   BILT 0.5   TP 8.0       Estimated Creatinine Clearance: 42.2 mL/min (based on SCr of 0.72 mg/dL).    Recent Labs   Lab 24  0828   TROPHS 3       No results for input(s): \"PTP\", \"INR\" in the last 168 hours.               Microbiology    Hospital Encounter on 24   1. Blood  Culture     Status: None (Preliminary result)    Collection Time: 09/30/24 11:42 AM    Specimen: Blood,peripheral   Result Value Ref Range    Blood Culture Result No Growth 2 Days N/A   2. Urine Culture, Routine     Status: None    Collection Time: 09/30/24  9:49 AM    Specimen: Urine, clean catch   Result Value Ref Range    Urine Culture No Growth at 18-24 hrs. N/A         Imaging: Reviewed in Epic.    Medications:    levothyroxine  50 mcg Oral Before breakfast    losartan  50 mg Oral Daily    venlafaxine ER  75 mg Oral Daily    enoxaparin  40 mg Subcutaneous Daily    cefTRIAXone  1 g Intravenous Q24H       Assessment & Plan:      # Generalized weakness, presumed UTI   -on empiric IV abx, f/u UCx which is pending at this time  -Check TSH with reflex T4     # Shortness of breath, unclear etiology  CXR done on admission on 9/30/2024 with hyperexpansion of the lungs.  Minimal left basilar opacity which may represent scarring versus atelectasis.  WBC wnl, afebrile  Will check D dimer and CTA to r/o PE if elevated  Initial troponin done on 9/30/2024 was negative at 3.  EKG done on admission on 9/30/2024 showed normal sinus rhythm, nonspecific ST-T wave changes in inferior and anterior leads.  Will repeat troponin and EKG    #HTN, controlled     #DL, statin     #Hypothyroid, Synthroid    #Anxiety/depression, home meds resumed    #Hyponatremia on admission  Follow BMP     #Hyperkalemia on admission  -Follow BMP    Discussed with patient, RN    Greta Casper MD      Supplementary Documentation:     Quality:  DVT Mechanical Prophylaxis:   SCDs,    DVT Pharmacologic Prophylaxis   Medication    enoxaparin (Lovenox) 40 MG/0.4ML SUBQ injection 40 mg                Code Status: DNAR/Selective Treatment  Sargent: No urinary catheter in place  Sargent Duration (in days):   Central line:    EUNICE: 10/2/2024    Discharge is dependent on: progress  At this point Ms. Barrios is expected to be discharge to: home    The 21st Century Cures  Act makes medical notes like these available to patients in the interest of transparency. Please be advised this is a medical document. Medical documents are intended to carry relevant information, facts as evident, and the clinical opinion of the practitioner. The medical note is intended as peer to peer communication and may appear blunt or direct. It is written in medical language and may contain abbreviations or verbiage that are unfamiliar.

## 2024-10-03 NOTE — PROGRESS NOTES
NURSING DISCHARGE NOTE    Discharged Nursing home via Ambulance.  Accompanied by Support staff  Belongings Taken by patient/family.    Discharge education reviewed with Eloina RN at [facility] and pt. Disease information provided and discussed. Follow ups discussed. All questions and concerns addressed, pt and Eloina verbalized understanding. Awaiting [facility] arrival to transport to [facility]. Discharge packet prepared and sent with patient. Son in law Andrew updated. PIV and tele removed. Pt ready for discharge.

## 2024-10-03 NOTE — CM/SW NOTE
10/03/24 1419   Discharge disposition   Expected discharge disposition   (Assisted Living)   Post Acute Care Provider   ([facility])   Discharge transportation [facility]     Notified by SW that pt ready for discharge.  Noted discharge order entered this morning. Pt awaiting CTA    Called [facility] [phone] and spoke w/RN Emily and they can accept her back whenever she is ready.    Called pt in her room and she is requesting [facility] Transfer to [facility] on will call [phone]  PCS completed in Epic    RN Report: [phone]    / to remain available for support and/or discharge planning.     Yun Whittaker MBA MSN, RN CTL/  [phone]

## 2024-10-04 LAB
ATRIAL RATE: 96 BPM
P AXIS: 69 DEGREES
P-R INTERVAL: 194 MS
Q-T INTERVAL: 342 MS
QRS DURATION: 76 MS
QTC CALCULATION (BEZET): 432 MS
R AXIS: -34 DEGREES
T AXIS: 48 DEGREES
VENTRICULAR RATE: 96 BPM

## 2024-10-16 ENCOUNTER — LAB REQUISITION (OUTPATIENT)
Dept: LAB | Facility: HOSPITAL | Age: 89
End: 2024-10-16
Payer: MEDICARE

## 2024-10-16 DIAGNOSIS — R06.02 SHORTNESS OF BREATH: ICD-10-CM

## 2024-10-16 DIAGNOSIS — R69 ILLNESS, UNSPECIFIED: ICD-10-CM

## 2024-10-16 DIAGNOSIS — I10 ESSENTIAL (PRIMARY) HYPERTENSION: ICD-10-CM

## 2024-10-16 LAB
ALBUMIN SERPL-MCNC: 4.4 G/DL (ref 3.2–4.8)
ALBUMIN/GLOB SERPL: 1.7 {RATIO} (ref 1–2)
ALP LIVER SERPL-CCNC: 43 U/L
ALT SERPL-CCNC: <7 U/L
ANION GAP SERPL CALC-SCNC: 2 MMOL/L (ref 0–18)
AST SERPL-CCNC: 19 U/L (ref ?–34)
BASOPHILS # BLD AUTO: 0.07 X10(3) UL (ref 0–0.2)
BASOPHILS NFR BLD AUTO: 1 %
BILIRUB SERPL-MCNC: 0.3 MG/DL (ref 0.2–0.9)
BILIRUB UR QL STRIP.AUTO: NEGATIVE
BUN BLD-MCNC: 7 MG/DL (ref 9–23)
CALCIUM BLD-MCNC: 8.9 MG/DL (ref 8.7–10.4)
CHLORIDE SERPL-SCNC: 100 MMOL/L (ref 98–112)
CLARITY UR REFRACT.AUTO: CLEAR
CO2 SERPL-SCNC: 27 MMOL/L (ref 21–32)
COLOR UR AUTO: COLORLESS
CREAT BLD-MCNC: 0.64 MG/DL
EGFRCR SERPLBLD CKD-EPI 2021: 82 ML/MIN/1.73M2 (ref 60–?)
EOSINOPHIL # BLD AUTO: 0.18 X10(3) UL (ref 0–0.7)
EOSINOPHIL NFR BLD AUTO: 2.6 %
ERYTHROCYTE [DISTWIDTH] IN BLOOD BY AUTOMATED COUNT: 13.5 %
FASTING STATUS PATIENT QL REPORTED: NO
GLOBULIN PLAS-MCNC: 2.6 G/DL (ref 2–3.5)
GLUCOSE BLD-MCNC: 84 MG/DL (ref 70–99)
GLUCOSE UR STRIP.AUTO-MCNC: NORMAL MG/DL
HCT VFR BLD AUTO: 38.9 %
HGB BLD-MCNC: 13.1 G/DL
IMM GRANULOCYTES # BLD AUTO: 0.03 X10(3) UL (ref 0–1)
IMM GRANULOCYTES NFR BLD: 0.4 %
KETONES UR STRIP.AUTO-MCNC: NEGATIVE MG/DL
LEUKOCYTE ESTERASE UR QL STRIP.AUTO: NEGATIVE
LYMPHOCYTES # BLD AUTO: 1.99 X10(3) UL (ref 1–4)
LYMPHOCYTES NFR BLD AUTO: 28.5 %
MCH RBC QN AUTO: 30.9 PG (ref 26–34)
MCHC RBC AUTO-ENTMCNC: 33.7 G/DL (ref 31–37)
MCV RBC AUTO: 91.7 FL
MONOCYTES # BLD AUTO: 0.6 X10(3) UL (ref 0.1–1)
MONOCYTES NFR BLD AUTO: 8.6 %
NEUTROPHILS # BLD AUTO: 4.12 X10 (3) UL (ref 1.5–7.7)
NEUTROPHILS # BLD AUTO: 4.12 X10(3) UL (ref 1.5–7.7)
NEUTROPHILS NFR BLD AUTO: 58.9 %
NITRITE UR QL STRIP.AUTO: NEGATIVE
OSMOLALITY SERPL CALC.SUM OF ELEC: 265 MOSM/KG (ref 275–295)
PH UR STRIP.AUTO: 6.5 [PH] (ref 5–8)
PLATELET # BLD AUTO: 261 10(3)UL (ref 150–450)
POTASSIUM SERPL-SCNC: 4.4 MMOL/L (ref 3.5–5.1)
PROT SERPL-MCNC: 7 G/DL (ref 5.7–8.2)
PROT UR STRIP.AUTO-MCNC: NEGATIVE MG/DL
RBC # BLD AUTO: 4.24 X10(6)UL
RBC UR QL AUTO: NEGATIVE
SODIUM SERPL-SCNC: 129 MMOL/L (ref 136–145)
SP GR UR STRIP.AUTO: 1.01 (ref 1–1.03)
UROBILINOGEN UR STRIP.AUTO-MCNC: NORMAL MG/DL
WBC # BLD AUTO: 7 X10(3) UL (ref 4–11)

## 2024-10-16 PROCEDURE — 81003 URINALYSIS AUTO W/O SCOPE: CPT | Performed by: FAMILY MEDICINE

## 2024-10-16 PROCEDURE — 85025 COMPLETE CBC W/AUTO DIFF WBC: CPT | Performed by: FAMILY MEDICINE

## 2024-10-16 PROCEDURE — 87086 URINE CULTURE/COLONY COUNT: CPT | Performed by: FAMILY MEDICINE

## 2024-10-16 PROCEDURE — 80053 COMPREHEN METABOLIC PANEL: CPT | Performed by: FAMILY MEDICINE

## 2025-02-15 ENCOUNTER — LAB REQUISITION (OUTPATIENT)
Dept: LAB | Facility: HOSPITAL | Age: OVER 89
End: 2025-02-15
Payer: MEDICARE

## 2025-02-15 DIAGNOSIS — Z00.00 ENCOUNTER FOR GENERAL ADULT MEDICAL EXAMINATION WITHOUT ABNORMAL FINDINGS: ICD-10-CM

## 2025-02-15 PROCEDURE — 87493 C DIFF AMPLIFIED PROBE: CPT | Performed by: FAMILY MEDICINE

## 2025-02-15 PROCEDURE — 87209 SMEAR COMPLEX STAIN: CPT | Performed by: FAMILY MEDICINE

## 2025-02-15 PROCEDURE — 87045 FECES CULTURE AEROBIC BACT: CPT | Performed by: FAMILY MEDICINE

## 2025-02-15 PROCEDURE — 87015 SPECIMEN INFECT AGNT CONCNTJ: CPT | Performed by: FAMILY MEDICINE

## 2025-02-15 PROCEDURE — 87427 SHIGA-LIKE TOXIN AG IA: CPT | Performed by: FAMILY MEDICINE

## 2025-02-15 PROCEDURE — 87046 STOOL CULTR AEROBIC BACT EA: CPT | Performed by: FAMILY MEDICINE

## 2025-02-15 PROCEDURE — 87177 OVA AND PARASITES SMEARS: CPT | Performed by: FAMILY MEDICINE

## 2025-02-16 LAB — C DIFF TOX B STL QL: NEGATIVE

## 2025-03-14 ENCOUNTER — LAB REQUISITION (OUTPATIENT)
Dept: LAB | Facility: HOSPITAL | Age: OVER 89
End: 2025-03-14
Payer: MEDICARE

## 2025-03-14 DIAGNOSIS — R69 ILLNESS, UNSPECIFIED: ICD-10-CM

## 2025-03-14 LAB
BILIRUB UR QL STRIP.AUTO: NEGATIVE
GLUCOSE UR STRIP.AUTO-MCNC: NORMAL MG/DL
HYALINE CASTS #/AREA URNS AUTO: PRESENT /LPF
KETONES UR STRIP.AUTO-MCNC: NEGATIVE MG/DL
LEUKOCYTE ESTERASE UR QL STRIP.AUTO: 500
NITRITE UR QL STRIP.AUTO: NEGATIVE
PH UR STRIP.AUTO: 5.5 [PH] (ref 5–8)
PROT UR STRIP.AUTO-MCNC: 30 MG/DL
SP GR UR STRIP.AUTO: 1.01 (ref 1–1.03)
UROBILINOGEN UR STRIP.AUTO-MCNC: NORMAL MG/DL
WBC #/AREA URNS AUTO: >50 /HPF

## 2025-03-14 PROCEDURE — 87186 SC STD MICRODIL/AGAR DIL: CPT | Performed by: FAMILY MEDICINE

## 2025-03-14 PROCEDURE — 81001 URINALYSIS AUTO W/SCOPE: CPT | Performed by: FAMILY MEDICINE

## 2025-03-14 PROCEDURE — 87077 CULTURE AEROBIC IDENTIFY: CPT | Performed by: FAMILY MEDICINE

## 2025-03-14 PROCEDURE — 87086 URINE CULTURE/COLONY COUNT: CPT | Performed by: FAMILY MEDICINE

## 2025-04-08 ENCOUNTER — LAB REQUISITION (OUTPATIENT)
Dept: LAB | Facility: HOSPITAL | Age: OVER 89
End: 2025-04-08
Payer: MEDICARE

## 2025-04-08 DIAGNOSIS — Z00.00 ENCOUNTER FOR GENERAL ADULT MEDICAL EXAMINATION WITHOUT ABNORMAL FINDINGS: ICD-10-CM

## 2025-04-08 LAB — WBC #/AREA URNS AUTO: >50 /HPF

## 2025-04-08 PROCEDURE — 81015 MICROSCOPIC EXAM OF URINE: CPT | Performed by: FAMILY MEDICINE

## 2025-04-08 PROCEDURE — 87086 URINE CULTURE/COLONY COUNT: CPT | Performed by: FAMILY MEDICINE

## 2025-04-22 ENCOUNTER — LAB REQUISITION (OUTPATIENT)
Dept: LAB | Facility: HOSPITAL | Age: OVER 89
End: 2025-04-22
Payer: MEDICARE

## 2025-04-22 DIAGNOSIS — Z00.00 ENCOUNTER FOR GENERAL ADULT MEDICAL EXAMINATION WITHOUT ABNORMAL FINDINGS: ICD-10-CM

## 2025-04-22 LAB
BILIRUB UR QL STRIP.AUTO: NEGATIVE
CLARITY UR REFRACT.AUTO: CLEAR
GLUCOSE UR STRIP.AUTO-MCNC: NORMAL MG/DL
KETONES UR STRIP.AUTO-MCNC: NEGATIVE MG/DL
LEUKOCYTE ESTERASE UR QL STRIP.AUTO: 75
NITRITE UR QL STRIP.AUTO: NEGATIVE
PH UR STRIP.AUTO: 5.5 [PH] (ref 5–8)
PROT UR STRIP.AUTO-MCNC: NEGATIVE MG/DL
RBC UR QL AUTO: NEGATIVE
SP GR UR STRIP.AUTO: 1.01 (ref 1–1.03)
UROBILINOGEN UR STRIP.AUTO-MCNC: NORMAL MG/DL

## 2025-04-22 PROCEDURE — 87086 URINE CULTURE/COLONY COUNT: CPT | Performed by: FAMILY MEDICINE

## 2025-04-22 PROCEDURE — 81001 URINALYSIS AUTO W/SCOPE: CPT | Performed by: FAMILY MEDICINE

## 2025-07-29 ENCOUNTER — LAB REQUISITION (OUTPATIENT)
Dept: LAB | Facility: HOSPITAL | Age: OVER 89
End: 2025-07-29

## 2025-07-29 DIAGNOSIS — Z00.00 ENCOUNTER FOR GENERAL ADULT MEDICAL EXAMINATION WITHOUT ABNORMAL FINDINGS: ICD-10-CM

## 2025-07-29 PROCEDURE — 81015 MICROSCOPIC EXAM OF URINE: CPT | Performed by: FAMILY MEDICINE

## 2025-07-31 ENCOUNTER — LAB REQUISITION (OUTPATIENT)
Dept: LAB | Facility: HOSPITAL | Age: OVER 89
End: 2025-07-31

## 2025-07-31 DIAGNOSIS — R19.7 DIARRHEA, UNSPECIFIED: ICD-10-CM

## 2025-07-31 DIAGNOSIS — E03.9 HYPOTHYROIDISM, UNSPECIFIED: ICD-10-CM

## 2025-07-31 DIAGNOSIS — F41.1 GENERALIZED ANXIETY DISORDER: ICD-10-CM

## 2025-07-31 DIAGNOSIS — R52 PAIN, UNSPECIFIED: ICD-10-CM

## 2025-07-31 DIAGNOSIS — E55.9 VITAMIN D DEFICIENCY, UNSPECIFIED: ICD-10-CM

## 2025-07-31 DIAGNOSIS — R82.81 PYURIA: ICD-10-CM

## 2025-07-31 DIAGNOSIS — I10 ESSENTIAL (PRIMARY) HYPERTENSION: ICD-10-CM

## 2025-07-31 DIAGNOSIS — M81.0 AGE-RELATED OSTEOPOROSIS WITHOUT CURRENT PATHOLOGICAL FRACTURE: ICD-10-CM

## 2025-07-31 DIAGNOSIS — Z91.09 OTHER ALLERGY STATUS, OTHER THAN TO DRUGS AND BIOLOGICAL SUBSTANCES: ICD-10-CM

## 2025-07-31 DIAGNOSIS — S22.060A WEDGE COMPRESSION FRACTURE OF T7-T8 VERTEBRA, INITIAL ENCOUNTER FOR CLOSED FRACTURE (HCC): ICD-10-CM

## 2025-07-31 LAB
BILIRUB UR QL STRIP.AUTO: NEGATIVE
CLARITY UR REFRACT.AUTO: CLEAR
COLOR UR AUTO: COLORLESS
GLUCOSE UR STRIP.AUTO-MCNC: NORMAL MG/DL
KETONES UR STRIP.AUTO-MCNC: NEGATIVE MG/DL
LEUKOCYTE ESTERASE UR QL STRIP.AUTO: 25
NITRITE UR QL STRIP.AUTO: NEGATIVE
PH UR STRIP.AUTO: 6.5 (ref 5–8)
PROT UR STRIP.AUTO-MCNC: NEGATIVE MG/DL
RBC UR QL AUTO: NEGATIVE
SP GR UR STRIP.AUTO: 1.01 (ref 1–1.03)
UROBILINOGEN UR STRIP.AUTO-MCNC: NORMAL MG/DL

## 2025-07-31 PROCEDURE — 87086 URINE CULTURE/COLONY COUNT: CPT | Performed by: FAMILY MEDICINE

## 2025-07-31 PROCEDURE — 81001 URINALYSIS AUTO W/SCOPE: CPT | Performed by: FAMILY MEDICINE

## 2025-08-21 ENCOUNTER — LAB REQUISITION (OUTPATIENT)
Dept: LAB | Facility: HOSPITAL | Age: OVER 89
End: 2025-08-21

## 2025-08-21 DIAGNOSIS — E55.9 VITAMIN D DEFICIENCY, UNSPECIFIED: ICD-10-CM

## 2025-08-21 DIAGNOSIS — E03.9 HYPOTHYROIDISM, UNSPECIFIED: ICD-10-CM

## 2025-08-21 DIAGNOSIS — I10 ESSENTIAL (PRIMARY) HYPERTENSION: ICD-10-CM

## 2025-08-21 DIAGNOSIS — F41.1 GENERALIZED ANXIETY DISORDER: ICD-10-CM

## 2025-08-21 DIAGNOSIS — M81.0 AGE-RELATED OSTEOPOROSIS WITHOUT CURRENT PATHOLOGICAL FRACTURE: ICD-10-CM

## 2025-08-21 LAB
BILIRUB UR QL STRIP.AUTO: NEGATIVE
CLARITY UR REFRACT.AUTO: CLEAR
GLUCOSE UR STRIP.AUTO-MCNC: NORMAL MG/DL
KETONES UR STRIP.AUTO-MCNC: NEGATIVE MG/DL
LEUKOCYTE ESTERASE UR QL STRIP.AUTO: 250
NITRITE UR QL STRIP.AUTO: NEGATIVE
PH UR STRIP.AUTO: 6.5 (ref 5–8)
PROT UR STRIP.AUTO-MCNC: NEGATIVE MG/DL
RBC UR QL AUTO: NEGATIVE
SP GR UR STRIP.AUTO: 1.01 (ref 1–1.03)
UROBILINOGEN UR STRIP.AUTO-MCNC: NORMAL MG/DL

## 2025-08-21 PROCEDURE — 87086 URINE CULTURE/COLONY COUNT: CPT | Performed by: FAMILY MEDICINE

## 2025-08-21 PROCEDURE — 87077 CULTURE AEROBIC IDENTIFY: CPT | Performed by: FAMILY MEDICINE

## 2025-08-21 PROCEDURE — 81001 URINALYSIS AUTO W/SCOPE: CPT | Performed by: FAMILY MEDICINE

## 2025-08-24 ENCOUNTER — APPOINTMENT (OUTPATIENT)
Dept: CT IMAGING | Facility: HOSPITAL | Age: OVER 89
End: 2025-08-24
Attending: EMERGENCY MEDICINE

## 2025-08-24 ENCOUNTER — HOSPITAL ENCOUNTER (EMERGENCY)
Facility: HOSPITAL | Age: OVER 89
Discharge: HOME | End: 2025-08-24
Attending: EMERGENCY MEDICINE

## 2025-08-24 VITALS
RESPIRATION RATE: 17 BRPM | BODY MASS INDEX: 25 KG/M2 | TEMPERATURE: 99 F | DIASTOLIC BLOOD PRESSURE: 93 MMHG | SYSTOLIC BLOOD PRESSURE: 138 MMHG | WEIGHT: 146 LBS | OXYGEN SATURATION: 87 % | HEART RATE: 108 BPM

## 2025-08-24 DIAGNOSIS — S32.010A COMPRESSION FRACTURE OF L1 VERTEBRA, INITIAL ENCOUNTER (HCC): ICD-10-CM

## 2025-08-24 DIAGNOSIS — G89.29 CHRONIC LOW BACK PAIN WITHOUT SCIATICA, UNSPECIFIED BACK PAIN LATERALITY: Primary | ICD-10-CM

## 2025-08-24 DIAGNOSIS — R33.9 URINARY RETENTION: ICD-10-CM

## 2025-08-24 DIAGNOSIS — K59.00 CONSTIPATION, UNSPECIFIED CONSTIPATION TYPE: ICD-10-CM

## 2025-08-24 DIAGNOSIS — M54.50 CHRONIC LOW BACK PAIN WITHOUT SCIATICA, UNSPECIFIED BACK PAIN LATERALITY: Primary | ICD-10-CM

## 2025-08-24 LAB
ALBUMIN SERPL-MCNC: 4.5 G/DL (ref 3.2–4.8)
ALBUMIN/GLOB SERPL: 1.5 (ref 1–2)
ALP LIVER SERPL-CCNC: 88 U/L (ref 55–142)
ALT SERPL-CCNC: <7 U/L (ref 10–49)
ANION GAP SERPL CALC-SCNC: 12 MMOL/L (ref 0–18)
AST SERPL-CCNC: 24 U/L (ref ?–34)
BASOPHILS # BLD AUTO: 0.07 X10(3) UL (ref 0–0.2)
BASOPHILS NFR BLD AUTO: 0.9 %
BILIRUB SERPL-MCNC: 0.4 MG/DL (ref 0.2–0.9)
BILIRUB UR QL STRIP.AUTO: NEGATIVE
BUN BLD-MCNC: 14 MG/DL (ref 9–23)
CALCIUM BLD-MCNC: 10.5 MG/DL (ref 8.7–10.6)
CHLORIDE SERPL-SCNC: 102 MMOL/L (ref 98–112)
CLARITY UR REFRACT.AUTO: CLEAR
CO2 SERPL-SCNC: 24 MMOL/L (ref 21–32)
CREAT BLD-MCNC: 0.97 MG/DL (ref 0.55–1.02)
EGFRCR SERPLBLD CKD-EPI 2021: 54 ML/MIN/1.73M2 (ref 60–?)
EOSINOPHIL # BLD AUTO: 0.27 X10(3) UL (ref 0–0.7)
EOSINOPHIL NFR BLD AUTO: 3.5 %
ERYTHROCYTE [DISTWIDTH] IN BLOOD BY AUTOMATED COUNT: 13.4 %
GLOBULIN PLAS-MCNC: 3.1 G/DL (ref 2–3.5)
GLUCOSE BLD-MCNC: 112 MG/DL (ref 70–99)
GLUCOSE UR STRIP.AUTO-MCNC: NORMAL MG/DL
HCT VFR BLD AUTO: 39.6 % (ref 35–48)
HGB BLD-MCNC: 13.3 G/DL (ref 12–16)
IMM GRANULOCYTES # BLD AUTO: 0.04 X10(3) UL (ref 0–1)
IMM GRANULOCYTES NFR BLD: 0.5 %
KETONES UR STRIP.AUTO-MCNC: NEGATIVE MG/DL
LEUKOCYTE ESTERASE UR QL STRIP.AUTO: NEGATIVE
LIPASE SERPL-CCNC: 31 U/L (ref 12–53)
LYMPHOCYTES # BLD AUTO: 1.32 X10(3) UL (ref 1–4)
LYMPHOCYTES NFR BLD AUTO: 17 %
MCH RBC QN AUTO: 30.6 PG (ref 26–34)
MCHC RBC AUTO-ENTMCNC: 33.6 G/DL (ref 31–37)
MCV RBC AUTO: 91.2 FL (ref 80–100)
MONOCYTES # BLD AUTO: 0.85 X10(3) UL (ref 0.1–1)
MONOCYTES NFR BLD AUTO: 10.9 %
NEUTROPHILS # BLD AUTO: 5.23 X10 (3) UL (ref 1.5–7.7)
NEUTROPHILS # BLD AUTO: 5.23 X10(3) UL (ref 1.5–7.7)
NEUTROPHILS NFR BLD AUTO: 67.2 %
NITRITE UR QL STRIP.AUTO: NEGATIVE
OSMOLALITY SERPL CALC.SUM OF ELEC: 287 MOSM/KG (ref 275–295)
PH UR STRIP.AUTO: 6 (ref 5–8)
PLATELET # BLD AUTO: 264 10(3)UL (ref 150–450)
PLATELETS.RETICULATED NFR BLD AUTO: 2.3 % (ref 0–7)
POTASSIUM SERPL-SCNC: 4.4 MMOL/L (ref 3.5–5.1)
PROT SERPL-MCNC: 7.6 G/DL (ref 5.7–8.2)
PROT UR STRIP.AUTO-MCNC: NEGATIVE MG/DL
RBC # BLD AUTO: 4.34 X10(6)UL (ref 3.8–5.3)
RBC UR QL AUTO: NEGATIVE
SODIUM SERPL-SCNC: 138 MMOL/L (ref 136–145)
SP GR UR STRIP.AUTO: 1.02 (ref 1–1.03)
UROBILINOGEN UR STRIP.AUTO-MCNC: NORMAL MG/DL
WBC # BLD AUTO: 7.8 X10(3) UL (ref 4–11)

## 2025-08-24 PROCEDURE — 74177 CT ABD & PELVIS W/CONTRAST: CPT | Performed by: EMERGENCY MEDICINE

## 2025-08-24 PROCEDURE — 80053 COMPREHEN METABOLIC PANEL: CPT | Performed by: EMERGENCY MEDICINE

## 2025-08-24 PROCEDURE — 85025 COMPLETE CBC W/AUTO DIFF WBC: CPT | Performed by: EMERGENCY MEDICINE

## 2025-08-24 PROCEDURE — 81003 URINALYSIS AUTO W/O SCOPE: CPT | Performed by: EMERGENCY MEDICINE

## 2025-08-24 PROCEDURE — 72131 CT LUMBAR SPINE W/O DYE: CPT | Performed by: EMERGENCY MEDICINE

## 2025-08-24 PROCEDURE — 83690 ASSAY OF LIPASE: CPT | Performed by: EMERGENCY MEDICINE

## 2025-08-24 RX ORDER — POLYETHYLENE GLYCOL 3350 17 G/17G
17 POWDER, FOR SOLUTION ORAL DAILY PRN
Qty: 12 EACH | Refills: 0 | Status: SHIPPED | OUTPATIENT
Start: 2025-08-24 | End: 2025-09-23

## 2025-08-24 RX ORDER — HYDROMORPHONE HYDROCHLORIDE 1 MG/ML
0.5 INJECTION, SOLUTION INTRAMUSCULAR; INTRAVENOUS; SUBCUTANEOUS EVERY 30 MIN PRN
Refills: 0 | Status: DISCONTINUED | OUTPATIENT
Start: 2025-08-24 | End: 2025-08-24

## 2025-08-31 ENCOUNTER — APPOINTMENT (OUTPATIENT)
Dept: CT IMAGING | Facility: HOSPITAL | Age: OVER 89
End: 2025-08-31
Attending: EMERGENCY MEDICINE

## 2025-08-31 ENCOUNTER — HOSPITAL ENCOUNTER (EMERGENCY)
Facility: HOSPITAL | Age: OVER 89
Discharge: HOME | End: 2025-08-31
Attending: EMERGENCY MEDICINE

## 2025-08-31 ENCOUNTER — APPOINTMENT (OUTPATIENT)
Dept: GENERAL RADIOLOGY | Facility: HOSPITAL | Age: OVER 89
End: 2025-08-31
Attending: EMERGENCY MEDICINE

## 2025-08-31 VITALS
HEART RATE: 103 BPM | SYSTOLIC BLOOD PRESSURE: 132 MMHG | OXYGEN SATURATION: 93 % | RESPIRATION RATE: 20 BRPM | DIASTOLIC BLOOD PRESSURE: 86 MMHG | TEMPERATURE: 98 F

## 2025-08-31 DIAGNOSIS — R07.89 CHEST WALL PAIN: Primary | ICD-10-CM

## 2025-08-31 LAB
ANION GAP SERPL CALC-SCNC: 15 MMOL/L (ref 0–18)
ATRIAL RATE: 107 BPM
BASOPHILS # BLD AUTO: 0.1 X10(3) UL (ref 0–0.2)
BASOPHILS NFR BLD AUTO: 1.4 %
BUN BLD-MCNC: 12 MG/DL (ref 9–23)
CALCIUM BLD-MCNC: 10.7 MG/DL (ref 8.7–10.6)
CHLORIDE SERPL-SCNC: 100 MMOL/L (ref 98–112)
CO2 SERPL-SCNC: 23 MMOL/L (ref 21–32)
CREAT BLD-MCNC: 0.91 MG/DL (ref 0.55–1.02)
D DIMER PPP FEU-MCNC: 5.08 UG/ML FEU (ref ?–0.94)
EGFRCR SERPLBLD CKD-EPI 2021: 58 ML/MIN/1.73M2 (ref 60–?)
EOSINOPHIL # BLD AUTO: 0.23 X10(3) UL (ref 0–0.7)
EOSINOPHIL NFR BLD AUTO: 3.1 %
ERYTHROCYTE [DISTWIDTH] IN BLOOD BY AUTOMATED COUNT: 12.8 %
FLUAV + FLUBV RNA SPEC NAA+PROBE: NEGATIVE
FLUAV + FLUBV RNA SPEC NAA+PROBE: NEGATIVE
GLUCOSE BLD-MCNC: 187 MG/DL (ref 70–99)
HCT VFR BLD AUTO: 42.6 % (ref 35–48)
HGB BLD-MCNC: 14.7 G/DL (ref 12–16)
IMM GRANULOCYTES # BLD AUTO: 0.03 X10(3) UL (ref 0–1)
IMM GRANULOCYTES NFR BLD: 0.4 %
LYMPHOCYTES # BLD AUTO: 1.87 X10(3) UL (ref 1–4)
LYMPHOCYTES NFR BLD AUTO: 25.5 %
MCH RBC QN AUTO: 30.8 PG (ref 26–34)
MCHC RBC AUTO-ENTMCNC: 34.5 G/DL (ref 31–37)
MCV RBC AUTO: 89.1 FL (ref 80–100)
MONOCYTES # BLD AUTO: 0.63 X10(3) UL (ref 0.1–1)
MONOCYTES NFR BLD AUTO: 8.6 %
NEUTROPHILS # BLD AUTO: 4.47 X10 (3) UL (ref 1.5–7.7)
NEUTROPHILS # BLD AUTO: 4.47 X10(3) UL (ref 1.5–7.7)
NEUTROPHILS NFR BLD AUTO: 61 %
OSMOLALITY SERPL CALC.SUM OF ELEC: 291 MOSM/KG (ref 275–295)
P AXIS: 59 DEGREES
P-R INTERVAL: 196 MS
PLATELET # BLD AUTO: 462 10(3)UL (ref 150–450)
POTASSIUM SERPL-SCNC: 4.1 MMOL/L (ref 3.5–5.1)
Q-T INTERVAL: 316 MS
QRS DURATION: 74 MS
QTC CALCULATION (BEZET): 421 MS
R AXIS: -31 DEGREES
RBC # BLD AUTO: 4.78 X10(6)UL (ref 3.8–5.3)
RSV RNA SPEC NAA+PROBE: NEGATIVE
SARS-COV-2 RNA RESP QL NAA+PROBE: NOT DETECTED
SODIUM SERPL-SCNC: 138 MMOL/L (ref 136–145)
T AXIS: 86 DEGREES
TROPONIN I SERPL HS-MCNC: <3 NG/L (ref ?–34)
VENTRICULAR RATE: 107 BPM
WBC # BLD AUTO: 7.3 X10(3) UL (ref 4–11)

## 2025-08-31 PROCEDURE — 84484 ASSAY OF TROPONIN QUANT: CPT | Performed by: EMERGENCY MEDICINE

## 2025-08-31 PROCEDURE — 71101 X-RAY EXAM UNILAT RIBS/CHEST: CPT | Performed by: EMERGENCY MEDICINE

## 2025-08-31 PROCEDURE — 71275 CT ANGIOGRAPHY CHEST: CPT | Performed by: EMERGENCY MEDICINE

## 2025-08-31 PROCEDURE — 85025 COMPLETE CBC W/AUTO DIFF WBC: CPT | Performed by: EMERGENCY MEDICINE

## 2025-08-31 PROCEDURE — 87637 SARSCOV2&INF A&B&RSV AMP PRB: CPT | Performed by: EMERGENCY MEDICINE

## 2025-08-31 PROCEDURE — 87637 SARSCOV2&INF A&B&RSV AMP PRB: CPT

## 2025-08-31 PROCEDURE — 85379 FIBRIN DEGRADATION QUANT: CPT | Performed by: EMERGENCY MEDICINE

## 2025-08-31 PROCEDURE — 80048 BASIC METABOLIC PNL TOTAL CA: CPT | Performed by: EMERGENCY MEDICINE

## 2025-08-31 RX ORDER — MORPHINE SULFATE 2 MG/ML
2 INJECTION, SOLUTION INTRAMUSCULAR; INTRAVENOUS ONCE
Status: COMPLETED | OUTPATIENT
Start: 2025-08-31 | End: 2025-08-31

## 2025-08-31 RX ORDER — TRAMADOL HYDROCHLORIDE 50 MG/1
50 TABLET ORAL EVERY 6 HOURS PRN
Qty: 10 TABLET | Refills: 0 | Status: SHIPPED | OUTPATIENT
Start: 2025-08-31 | End: 2025-09-05

## 2025-08-31 RX ORDER — LIDOCAINE 50 MG/G
1 PATCH TOPICAL EVERY 24 HOURS
Qty: 10 PATCH | Refills: 0 | Status: SHIPPED | OUTPATIENT
Start: 2025-08-31

## 2025-08-31 RX ORDER — LIDOCAINE 50 MG/G
1 PATCH TOPICAL EVERY 24 HOURS
Qty: 10 PATCH | Refills: 0 | Status: SHIPPED | OUTPATIENT
Start: 2025-08-31 | End: 2025-09-10

## 2025-08-31 RX ORDER — TRAMADOL HYDROCHLORIDE 50 MG/1
TABLET ORAL EVERY 6 HOURS PRN
Qty: 10 TABLET | Refills: 0 | Status: SHIPPED | OUTPATIENT
Start: 2025-08-31 | End: 2025-09-05

## (undated) DEVICE — SLEEVE COMPR MD KNEE LEN SGL USE KENDALL SCD

## (undated) DEVICE — CATHETER URET 5FR L70CM FLX OPN TIP NONPORTED

## (undated) DEVICE — 40580 - THE PINK PAD - ADVANCED TRENDELENBURG POSITIONING KIT: Brand: 40580 - THE PINK PAD - ADVANCED TRENDELENBURG POSITIONING KIT

## (undated) DEVICE — CLIP APPLIER WITH CLIP LOGIC TECHNOLOGY: Brand: ENDO CLIP III

## (undated) DEVICE — ADHESIVE SKIN TOP FOR WND CLSR DERMBND ADV

## (undated) DEVICE — GLOVE SUR 6.5 SENSICARE PI PIP GRN PWD F

## (undated) DEVICE — ZIPWIRE GUIDEWIRE .035X150 STR

## (undated) DEVICE — DISPOSABLE GRASPER: Brand: EPIX LAPAROSCOPIC GRASPER

## (undated) DEVICE — SUT MCRYL 4-0 18IN PS-2 ABSRB UD 19MM 3/8 CIR

## (undated) DEVICE — L-HOOK CAUTERY PROBE TIP, DISPOSABLE: Brand: RENEW

## (undated) DEVICE — SUT COAT VCRL+ 0 27IN UR-6 ABSRB VLT ANTIBACT

## (undated) DEVICE — VISUALIZATION SYSTEM: Brand: CLEARIFY

## (undated) DEVICE — DISPOSABLE LAPAROSCOPIC CLIP APPLIER WITH 20 CLIPS.: Brand: EPIX® UNIVERSAL CLIP APPLIER

## (undated) DEVICE — TROCAR: Brand: KII SHIELDED BLADED ACCESS SYSTEM

## (undated) DEVICE — GRABBER GRASPER TIP, DISPOSABLE: Brand: RENEW

## (undated) DEVICE — GLOVE SUR 6.5 SENSICARE PI PIP CRM PWD F

## (undated) DEVICE — SUT COAT VCRL + 0 54IN ABSRB UD ANTIBACT

## (undated) DEVICE — POUCH SPECIMEN WIRE 6X3 250ML

## (undated) DEVICE — SOLUTION IRRIG 1000ML 0.9% NACL USP BTL

## (undated) DEVICE — Device: Brand: SUTURE PASSOR PRO

## (undated) DEVICE — C-ARM: Brand: UNBRANDED

## (undated) DEVICE — LAP CHOLE/APPY CDS-LF: Brand: MEDLINE INDUSTRIES, INC.

## (undated) DEVICE — #15 STERILE STAINLESS BLADE: Brand: STERILE STAINLESS BLADES

## (undated) DEVICE — TRADITIONAL MARYLAND DISSECTOR TIP, DISPOSABLE: Brand: RENEW

## (undated) DEVICE — ENDOPATH ULTRA VERESS INSUFFLATION NEEDLES WITH LUER LOCK CONNECTORS: Brand: ENDOPATH

## (undated) DEVICE — TROCAR: Brand: KII® SLEEVE

## (undated) DEVICE — MINI ENDOCUT SCISSOR TIP, DISPOSABLE: Brand: RENEW

## (undated) DEVICE — PDS II VLT 0 107CM AG ST3: Brand: ENDOLOOP

## (undated) NOTE — IP AVS SNAPSHOT
Patient Demographics     Address  [address] [address] Phone  [phone] (Home)      Patient Contacts     Name Relation Home Work Mobile    Lydia Montilla Daughter [phone]      Liam Barrios Son   [phone]      Allergies as of 5/31/2024  Review status set to In Progress on 5/29/2024   No Known Allergies     Code Status Information     Code Status    DNAR/Selective Treatment        Patient Instructions       Cholecystectomy  Dr. Emily Mae    MEDICATIONS  For post-operative pain control, the medications are usually tramadol.  This is a narcotic and is best taken by starting with one tablet and repeating every four to six hours as needed.  If the patient does not feel they need the narcotics they shouldn’t take them.  If the pain is severe the patient may take up to two pills every six hours.  The patient can take tylenol 1g three times a day and ibuprofen 400-600mg in between up to 4 times a day as needed for pain as well.  The patient can take zofran 4mg every 6 hours as needed for nausea. The patient can also take miralax or colace as needed for constipation. Please ask your surgeon before resuming blood thinners such as aspirin, Plavix or Coumadin.  All other home medications may be resumed as scheduled.  With severe cholecystitis, antibiotics will also be prescribed.  With antibiotics, please watch for rash, facial swelling or severe diarrhea.    DIET  The patient may resume a general diet immediately.  This is not a good time to eat excessively.  The patient should eat in moderation and stick with foods the patient feels are easy to digest.  Avoid high fat foods in the immediate post-operative time period as this may still cause some problems.  The patient may eat anything in small amounts but foods rich in dairy products, fatty foods or fried foods may cause an upset stomach for up to six weeks after surgery.  There should be no alcohol consumption in the immediate recovery time  period or within six hours of taking narcotics.    WOUND CARE  The dressing is usually a dissolvable glue which protects the wound. The patient can take a shower starting the night of surgery, but please, no baths or soaking. Please do not put any creams or ointments on the surgical incisions. If the patient does have a top dressing with clear tape and gauze, this dressing may be removed in 2 days. Do not remove the steri-strips or butterfly tapes that are white and adherent to the skin.  The steri-strips will eventually peel up at the ends and at this point they may be removed.  This is usually seven to ten days after surgery.  When showering, soap can get on the wounds but do not scrub over the wounds.  No hair dye or chemicals of any kind should get in the wounds.  Avoid tub baths, swimming or sitting in a hot tub for two weeks.  If visible sutures or staples are present they will be removed in the office by the surgeon or nurse.  Most wounds will be closed with dissolving suture underneath the skin.  These sutures will dissolve on their own.  If a drain is present make sure the patient receives drain care instructions from the nurse prior to discharge.  Most patients will not have a drain.    ACTIVITY  Every day the patient should be up, showered and dressed.  Each day the patient should be up and around the house.  The patient should not lie in bed and should not stay in pajamas.  We count on the patient being up, coughing, walking and deep breathing to avoid pneumonia and blood clots in the legs.  Once a day the patient should get out of the house and go shopping, go to the mall, the [employer] store, the [employer] or a restaurant.  The patient may ride in a car but should not drive the car for at least one week.  Patients should be off narcotics for at least 8 hours prior to being a .  The average time off work is 10 to 14 days; most adults will be seeing the surgeon prior to returning to work.  Students  will return to school within 1-5 days after discharge from the hospital but will be off gym, sports, and indoors for recess for one month.  Patients may go up and down stairs and lift up to five pounds but no bending, pushing or pulling.  Nothing called work or exercise until the follow up visit.  No ‘stair-master’, power walking, jogging or workouts until the follow up visit.  Patients should seek further activity limits at the time of their appointment.    APPOINTMENT  Please call our office for an appointment within five to ten days of discharge.  Any fever greater than 100.5, chills, nausea, vomiting, or severe diarrhea please call our office.  If the wound turns red, hot, swollen, becomes increasingly painful, or drains pus call us immediately at [phone].  For life threatening emergencies call [phone].  For non-emergent care please call our office after 8:30 a.m. Monday through Friday.  The number listed above is our office number; our phone automatically switches to our answering service if we are not there.  Please do not use the emergency room for non-urgent care.    Thank you for entrusting us with your care.  [facility]--General Surgery       Follow-up Information     [facility]. Schedule an appointment as soon as possible for a visit in 2 week(s).    Contact information:  [address] [facility] [address] [phone]             Emily Mae MD Follow up.    Specialty: SURGERY, GENERAL  Why: As needed  Contact information:  [address] [facility] [address] [phone]                        Your Home Meds List      TAKE these medications       Instructions Authorizing Provider Morning Afternoon Evening As Needed   ALPRAZolam 0.25 MG Tabs  Commonly known as: Xanax  Next dose due: As needed      Take 1 tablet (0.25 mg total) by mouth 2 (two) times daily as needed for Anxiety.          denosumab 60 MG/ML Sosy  Commonly known as: Prolia      Inject 1 mL (60 mg total) into the skin one  time.          levothyroxine 75 MCG Tabs  Commonly known as: Synthroid  Next dose due: Tomorrow morning       Take 50 mcg by mouth before breakfast.          Polyethylene Glycol 3350 17 g Pack  Commonly known as: MIRALAX      Take 17 g by mouth daily as needed.   Sharon Zasada         traMADol 50 MG Tabs  Commonly known as: Ultram      Take 1 tablet (50 mg total) by mouth every 8 (eight) hours as needed for Pain. 1 tablet by mouth every 4-6 hours as needed for pain.   Sharon Zasada         valsartan 80 MG Tabs  Commonly known as: Diovan  Next dose due: Tomorrow morning       Take 2 tablets (160 mg total) by mouth daily.   Patria Smith         Venlafaxine HCl ER 75 MG Tb24  Commonly known as: EFFEXOR  Next dose due: Tomorrow morning       Take 0.5 tablets (37.5 mg total) by mouth daily.                Where to Get Your Medications      These medications were sent to [place] [address] [phone], [phone]  [address] [place] [address], [facility] [address]    Phone: [phone]   Polyethylene Glycol 3350 17 g Pack     These medications were sent to [id number] - [place], [place] - [address] AT [facility] [address], [phone], [phone] [address] [address]    Phone: [phone]   traMADol 50 MG Tabs           430-430-A - MAR ACTION REPORT  (last 48 hrs)    ** SITE UNKNOWN **     Order ID Medication Name Action Time Action Reason Comments    [id number] acetaminophen (Tylenol Extra Strength) tab 1,000 mg 05/30/24 0840 Given      [id number] acetaminophen (Tylenol Extra Strength) tab 1,000 mg 05/30/24 2002 Given      [id number] acetaminophen (Tylenol Extra Strength) tab 1,000 mg 05/31/24 1338 Given      [id number] amLODIPine (Norvasc) tab 5 mg 05/30/24 0823 Given      [id number] amLODIPine (Norvasc) tab 5 mg 05/31/24 1120 Given      [id number] cetirizine (ZyrTEC) tab 5 mg 05/30/24 2000 Given      [id number] cetirizine  (ZyrTEC) tab 5 mg 05/31/24 1120 Given      [id number] labetalol (Trandate) 5 mg/mL injection 10 mg 05/29/24 1908 Given  bp-176/90    hr 86    [id number] labetalol (Trandate) 5 mg/mL injection 10 mg 05/30/24 0024 Given      [id number] levothyroxine (Synthroid) tab 50 mcg 05/30/24 0626 Given      [id number] levothyroxine (Synthroid) tab 50 mcg 05/31/24 0537 Given      [id number] losartan (Cozaar) tab 100 mg 05/30/24 0823 Given      [id number] losartan (Cozaar) tab 100 mg 05/31/24 1120 Given      [id number] ondansetron (Zofran) 4 MG/2ML injection 4 mg 05/30/24 0840 Given      [id number] oxyCODONE immediate release tab 2.5 mg (Or Linked Group #1) 05/30/24 2000 Given      [id number] oxyCODONE immediate release tab 5 mg (Or Linked Group #1) 05/31/24 0251 Given      [id number] pantoprazole (Protonix) DR tab 40 mg 05/31/24 0537 Given      [id number] venlafaxine ER (Effexor-XR) 24 hr cap 37.5 mg 05/30/24 0840 Given      [id number] venlafaxine ER (Effexor-XR) 24 hr cap 37.5 mg 05/31/24 1142 Given            LEFT LOWER ABDOMEN     Order ID Medication Name Action Time Action Reason Comments    [id number] enoxaparin (Lovenox) 40 MG/0.4ML SUBQ injection 40 mg 05/30/24 0818 Given            LEFT UPPER ARM     Order ID Medication Name Action Time Action Reason Comments    [id number] enoxaparin (Lovenox) 40 MG/0.4ML SUBQ injection 40 mg 05/31/24 1126 Given            RIGHT LOWER ARM     Order ID Medication Name Action Time Action Reason Comments    [id number] lidocaine-menthol 4-1 % patch 1 patch 05/31/24 1122 Patch Applied            RIGHT UPPER BACK     Order ID Medication Name Action Time Action Reason Comments    [id number] lidocaine-menthol 4-1 % patch 1 patch 05/30/24 2000 Patch Applied              Recent Vital Signs    Flowsheet Row Most Recent Value   /71 Filed at 05/31/2024 0440   Pulse 96 Filed at 05/31/2024 0440   Resp 18 Filed at 05/31/2024 0440   Temp 97.9 °F (36.6 °C) Filed at 05/31/2024 0440   SpO2 93 % Filed at 05/31/2024  0440      Patient's Most Recent Weight    Flowsheet Row Most Recent Value   Patient Weight 65 kg (143 lb 4.8 oz)      Lab Results Last 24 Hours    No matching results found     Microbiology Results (All)     Procedure Component Value Units Date/Time    Urine Culture, Routine [[id number]]  (Abnormal)  (Susceptibility) Collected: 24 0050    Order Status: Completed Lab Status: Final result Updated: 24 07    Specimen: Urine, clean catch      Urine Culture >100,000 CFU/ML Enterobacter cloacae complex    Susceptibility      Enterobacter cloacae complex      Not Specified     Cefazolin >=64  Resistant     Cefepime <=1  Sensitive     Ceftriaxone <=1  Sensitive     Ciprofloxacin <=0.25  Sensitive     Gentamicin <=1  Sensitive     Levofloxacin <=0.12  Sensitive     Meropenem <=0.25  Sensitive     Nitrofurantoin 64  Intermediate     Piperacillin + Tazobactam <=4  Sensitive     Trimethoprim/Sulfa <=20  Sensitive                          Rapid SARS-CoV-2 by PCR [[id number]]  (Normal) Collected: 24 0140    Order Status: Completed Lab Status: Final result Updated: 24    Specimen: Other from Nares      Rapid SARS-CoV-2 by PCR Not Detected      Pending Labs     Order Current Status    Specimen to Pathology Tissue In process         H&P - H&P Note      H&P signed by Christophe Mauro MD at 2024 10:10 PM  Version 3 of 3    Author: Christophe Mauro MD Service: — Author Type: Physician    Filed: 2024 10:10 PM Date of Service: 2024  2:39 AM Status: Addendum    : Christophe Mauro MD (Physician)    Related Notes: Original Note by Christophe Mauro MD (Physician) filed at 2024  2:50 AM         [facility]     Noemy Barrios Patient Status:  Emergency    1931 MRN [id number]   Location [facility] EMERGENCY DEPARTMENT Attending Juancho Steele MD   Hosp Day # 0 PCP Andrew Montilla MD     Chief Complaint:   Chief Complaint   Patient presents with     Abdomen/Flank Pain     Started at 1800 tonight. Took Tylenol PTA. Hx of gallstones.       Subjective:    History of Present Illness:     Noemy Barrios is a 89 year old female with a past medical history of anxiety and depression and hypertension.  She comes to the emergency room now secondary to abdominal pain.  Pain is located in the right upper quadrant.  Denies any fevers, chills or vomiting.  In the emergency room she had an abdominal ultrasound that shows choledocholithiasis.    History/Other:    Past Medical History:  Past Medical History:    Depression    Hearing impairment    Psychiatric disorder    ANXIETY    Thyroid disease    Unspecified essential hypertension    Visual impairment     Past Surgical History:   Past Surgical History:   Procedure Laterality Date    Colonoscopy        Family History:   History reviewed. No pertinent family history.  Social History:    reports that she has never smoked. She has never used smokeless tobacco. She reports current alcohol use.     Allergies: No Known Allergies    Medications:    No current facility-administered medications on file prior to encounter.     Current Outpatient Medications on File Prior to Encounter   Medication Sig Dispense Refill    ALPRAZolam 0.25 MG Oral Tab Take 1 tablet (0.25 mg total) by mouth 2 (two) times daily as needed for Anxiety.      denosumab 60 MG/ML Subcutaneous Solution Prefilled Syringe Inject 1 mL (60 mg total) into the skin one time.      valsartan 80 MG Oral Tab Take 1 tablet (80 mg total) by mouth daily.      Venlafaxine HCl ER 75 MG Oral Tablet 24 Hr Take 0.5 tablets (37.5 mg total) by mouth daily.      Levothyroxine Sodium (SYNTHROID, LEVOTHROID) 75 MCG Oral Tab Take 50 mcg by mouth before breakfast.      Cholecalciferol (VITAMIN D3) 250 MCG (08825 UT) Oral Cap Take by mouth. (Patient not taking: Reported on 5/25/2024)      metoprolol Tartrate (LOPRESSOR) 25 MG Oral Tab Take 0.5 tablets by mouth 2 (two) times daily.  (Patient not taking: Reported on 5/25/2024) 30 tablet 11    Vitamin B-12 (VITAMIN B12) 500 MCG Oral Tab Take 500 mcg by mouth daily. (Patient not taking: Reported on 5/25/2024)      aspirin 81 MG Oral Tab Take 81 mg by mouth daily. Taken 3 times a week - Monday Wednesday and Friday (Patient not taking: Reported on 5/25/2024)         Review of Systems:   A comprehensive review of systems was completed.    Pertinent positives and negatives noted in the HPI.    Objective:   Physical Exam:    /75   Pulse 88   Temp 97.2 °F (36.2 °C) (Temporal)   Resp 18   SpO2 95%   General: No acute distress, Alert  Respiratory: No rhonchi, no wheezes  Cardiovascular: S1, S2.   Abdomen: Soft, Non-tender, Non-distended, Positive bowel sounds  Neuro: No new focal deficits  Extremities: No edema      Results:    Labs:      Labs Last 24 Hours:  Recent Labs   Lab 05/25/24  2233   WBC 8.1   HGB 13.7   MCV 91.7   .0       Recent Labs   Lab 05/25/24  2233   *   BUN 10   CREATSERUM 0.75   CA 9.0   ALB 3.5   *   K 4.0      CO2 23.0   ALKPHO 67   AST 22   ALT 14   BILT 0.3   TP 7.3       CrCl cannot be calculated (Unknown ideal weight.).    No results for input(s): \"TROP\", \"TROPHS\", \"CK\" in the last 168 hours.    No results for input(s): \"PTP\", \"INR\" in the last 168 hours.    No results for input(s): \"TROP\", \"CK\" in the last 168 hours.      Imaging: Imaging data reviewed in Epic.    Assessment & Plan:      #Biliary colic  US with distended GB w/o cholecystitis and choledocholithiasis  Eval for ERCP  Pain control    #Abnl U/A, possible cystitis  ABX pending UCX    #HTN  Cont. ARB    #Anxiety/depression  Cont home meds    #Hypothyroid  synthroid    #Advance care planning  Voluntary meeting  16 minutes spent face-to-face with the patient.  ACP discussed, explained and reviewed with them in detail.  The patient is a DNAR/S.  CODE STATUS updated in system.    #PPX  Start after procedures       Plan of care discussed  with ED physician    Christophe Mauro MD    Supplementary Documentation:     The  Cures Act makes medical notes like these available to patients in the interest of transparency. Please be advised this is a medical document. Medical documents are intended to carry relevant information, facts as evident, and the clinical opinion of the practitioner. The medical note is intended as peer to peer communication and may appear blunt or direct. It is written in medical language and may contain abbreviations or verbiage that are unfamiliar.                                     Electronically signed by Christophe Mauro MD on 2024 10:10 PM     H&P signed by Christophe Mauro MD at 2024  2:50 AM  Version 2 of 3    Author: Christophe Mauro MD Service: — Author Type: Physician    Filed: 2024  2:50 AM Date of Service: 2024  2:39 AM Status: Addendum    : Christophe Mauro MD (Physician)    Related Notes: Addendum by Christophe Mauro MD (Physician) filed at 2024 10:10 PM  Original Note by Christophe Mauro MD (Physician) filed at 2024  2:47 AM         [facility]     Noemy Barrios Patient Status:  Emergency    1931 MRN [id number]   Location [facility] EMERGENCY DEPARTMENT Attending Juancho Steele MD   Hosp Day # 0 PCP Andrew Montilla MD     Chief Complaint:   Chief Complaint   Patient presents with    Abdomen/Flank Pain     Started at 1800 tonight. Took Tylenol PTA. Hx of gallstones.       Subjective:    History of Present Illness:     Noemy Barrios is a 89 year old female with a past medical history of anxiety and depression and hypertension.  She comes to the emergency room now secondary to abdominal pain.  Pain is located in the right upper quadrant.  Denies any fevers, chills or vomiting.  In the emergency room she had an abdominal ultrasound that shows choledocholithiasis.    History/Other:    Past Medical History:  Past Medical History:     Depression    Hearing impairment    Psychiatric disorder    ANXIETY    Thyroid disease    Unspecified essential hypertension    Visual impairment     Past Surgical History:   Past Surgical History:   Procedure Laterality Date    Colonoscopy        Family History:   History reviewed. No pertinent family history.  Social History:    reports that she has never smoked. She has never used smokeless tobacco. She reports current alcohol use.     Allergies: No Known Allergies    Medications:    No current facility-administered medications on file prior to encounter.     Current Outpatient Medications on File Prior to Encounter   Medication Sig Dispense Refill    ALPRAZolam 0.25 MG Oral Tab Take 1 tablet (0.25 mg total) by mouth 2 (two) times daily as needed for Anxiety.      denosumab 60 MG/ML Subcutaneous Solution Prefilled Syringe Inject 1 mL (60 mg total) into the skin one time.      valsartan 80 MG Oral Tab Take 1 tablet (80 mg total) by mouth daily.      Venlafaxine HCl ER 75 MG Oral Tablet 24 Hr Take 0.5 tablets (37.5 mg total) by mouth daily.      Levothyroxine Sodium (SYNTHROID, LEVOTHROID) 75 MCG Oral Tab Take 50 mcg by mouth before breakfast.      Cholecalciferol (VITAMIN D3) 250 MCG (98218 UT) Oral Cap Take by mouth. (Patient not taking: Reported on 5/25/2024)      metoprolol Tartrate (LOPRESSOR) 25 MG Oral Tab Take 0.5 tablets by mouth 2 (two) times daily. (Patient not taking: Reported on 5/25/2024) 30 tablet 11    Vitamin B-12 (VITAMIN B12) 500 MCG Oral Tab Take 500 mcg by mouth daily. (Patient not taking: Reported on 5/25/2024)      aspirin 81 MG Oral Tab Take 81 mg by mouth daily. Taken 3 times a week - Monday Wednesday and Friday (Patient not taking: Reported on 5/25/2024)         Review of Systems:   A comprehensive review of systems was completed.    Pertinent positives and negatives noted in the HPI.    Objective:   Physical Exam:    /75   Pulse 88   Temp 97.2 °F (36.2 °C) (Temporal)   Resp 18    SpO2 95%   General: No acute distress, Alert  Respiratory: No rhonchi, no wheezes  Cardiovascular: S1, S2.   Abdomen: Soft, Non-tender, Non-distended, Positive bowel sounds  Neuro: No new focal deficits  Extremities: No edema      Results:    Labs:      Labs Last 24 Hours:  Recent Labs   Lab 05/25/24  2233   WBC 8.1   HGB 13.7   MCV 91.7   .0       Recent Labs   Lab 05/25/24  2233   *   BUN 10   CREATSERUM 0.75   CA 9.0   ALB 3.5   *   K 4.0      CO2 23.0   ALKPHO 67   AST 22   ALT 14   BILT 0.3   TP 7.3       CrCl cannot be calculated (Unknown ideal weight.).    No results for input(s): \"TROP\", \"TROPHS\", \"CK\" in the last 168 hours.    No results for input(s): \"PTP\", \"INR\" in the last 168 hours.    No results for input(s): \"TROP\", \"CK\" in the last 168 hours.      Imaging: Imaging data reviewed in Epic.    Assessment & Plan:      #Bilary colic  US with distended GB w/o cholecystitis and choledocholithiasis  Eval for ERCP  Pain control    #Abnl U/A, possible cystitis  ABX pending UCX    #HTN  Cont. ARB    #Anxiety/depression  Cont home meds    #Hypothyroid  synthroid    #Advance care planning  Voluntary meeting  16 minutes spent face-to-face with the patient.  ACP discussed, explained and reviewed with them in detail.  The patient is a DNAR/S.  CODE STATUS updated in system.    #PPX  Start after procedures       Plan of care discussed with ED physician    Christophe Mauro MD    Supplementary Documentation:     The 21st Century Cures Act makes medical notes like these available to patients in the interest of transparency. Please be advised this is a medical document. Medical documents are intended to carry relevant information, facts as evident, and the clinical opinion of the practitioner. The medical note is intended as peer to peer communication and may appear blunt or direct. It is written in medical language and may contain abbreviations or verbiage that are unfamiliar.                                      Electronically signed by Christpohe Mauro MD on 2024  2:50 AM     H&P signed by Christophe Mauro MD at 2024  2:47 AM  Version 1 of 3    Author: Christophe Mauro MD Service: — Author Type: Physician    Filed: 2024  2:47 AM Date of Service: 2024  2:39 AM Status: Signed    : Christophe Mauro MD (Physician)    Related Notes: Addendum by Christophe Mauro MD (Physician) filed at 2024  2:50 AM         [facility]     Noemy Barrios Patient Status:  Emergency    1931 MRN [id number]   Location [facility] EMERGENCY DEPARTMENT Attending Juancho Steele MD   Hosp Day # 0 PCP Andrew Montilla MD     Chief Complaint:   Chief Complaint   Patient presents with    Abdomen/Flank Pain     Started at 1800 tonight. Took Tylenol PTA. Hx of gallstones.       Subjective:    History of Present Illness:     Noemy Barrios is a 89 year old female with a past medical history of anxiety and depression and hypertension.  She comes to the emergency room now secondary to abdominal pain.  Pain is located in the right upper quadrant.  Denies any fevers, chills or vomiting.  In the emergency room she had an abdominal ultrasound that shows choledocholithiasis.    History/Other:    Past Medical History:  Past Medical History:    Depression    Hearing impairment    Psychiatric disorder    ANXIETY    Thyroid disease    Unspecified essential hypertension    Visual impairment     Past Surgical History:   Past Surgical History:   Procedure Laterality Date    Colonoscopy        Family History:   History reviewed. No pertinent family history.  Social History:    reports that she has never smoked. She has never used smokeless tobacco. She reports current alcohol use.     Allergies: No Known Allergies    Medications:    No current facility-administered medications on file prior to encounter.     Current Outpatient Medications on File Prior to  Encounter   Medication Sig Dispense Refill    ALPRAZolam 0.25 MG Oral Tab Take 1 tablet (0.25 mg total) by mouth 2 (two) times daily as needed for Anxiety.      denosumab 60 MG/ML Subcutaneous Solution Prefilled Syringe Inject 1 mL (60 mg total) into the skin one time.      valsartan 80 MG Oral Tab Take 1 tablet (80 mg total) by mouth daily.      Venlafaxine HCl ER 75 MG Oral Tablet 24 Hr Take 0.5 tablets (37.5 mg total) by mouth daily.      Levothyroxine Sodium (SYNTHROID, LEVOTHROID) 75 MCG Oral Tab Take 50 mcg by mouth before breakfast.      Cholecalciferol (VITAMIN D3) 250 MCG (81593 UT) Oral Cap Take by mouth. (Patient not taking: Reported on 5/25/2024)      metoprolol Tartrate (LOPRESSOR) 25 MG Oral Tab Take 0.5 tablets by mouth 2 (two) times daily. (Patient not taking: Reported on 5/25/2024) 30 tablet 11    Vitamin B-12 (VITAMIN B12) 500 MCG Oral Tab Take 500 mcg by mouth daily. (Patient not taking: Reported on 5/25/2024)      aspirin 81 MG Oral Tab Take 81 mg by mouth daily. Taken 3 times a week - Monday Wednesday and Friday (Patient not taking: Reported on 5/25/2024)         Review of Systems:   A comprehensive review of systems was completed.    Pertinent positives and negatives noted in the HPI.    Objective:   Physical Exam:    /75   Pulse 88   Temp 97.2 °F (36.2 °C) (Temporal)   Resp 18   SpO2 95%   General: No acute distress, Alert  Respiratory: No rhonchi, no wheezes  Cardiovascular: S1, S2.   Abdomen: Soft, Non-tender, Non-distended, Positive bowel sounds  Neuro: No new focal deficits  Extremities: No edema      Results:    Labs:      Labs Last 24 Hours:  Recent Labs   Lab 05/25/24 2233   WBC 8.1   HGB 13.7   MCV 91.7   .0       Recent Labs   Lab 05/25/24 2233   *   BUN 10   CREATSERUM 0.75   CA 9.0   ALB 3.5   *   K 4.0      CO2 23.0   ALKPHO 67   AST 22   ALT 14   BILT 0.3   TP 7.3       CrCl cannot be calculated (Unknown ideal weight.).    No results for  input(s): \"TROP\", \"TROPHS\", \"CK\" in the last 168 hours.    No results for input(s): \"PTP\", \"INR\" in the last 168 hours.    No results for input(s): \"TROP\", \"CK\" in the last 168 hours.      Imaging: Imaging data reviewed in Epic.    Assessment & Plan:      #Bilary colic  US with distended GB w/o cholecystitis and choledocholithiasis  Eval for ERCP  Pain control    #Abnl U/A, possible cystitis  ABX pending UCX    #HTN  Cont. ARB    #Anxiety/depression  Cont home meds    #Advance care planning  Voluntary meeting  16 minutes spent face-to-face with the patient.  ACP discussed, explained and reviewed with them in detail.  The patient is a DNAR/S.  CODE STATUS updated in system.        Plan of care discussed with ED physician    Christophe Mauro MD    Supplementary Documentation:     The  Cures Act makes medical notes like these available to patients in the interest of transparency. Please be advised this is a medical document. Medical documents are intended to carry relevant information, facts as evident, and the clinical opinion of the practitioner. The medical note is intended as peer to peer communication and may appear blunt or direct. It is written in medical language and may contain abbreviations or verbiage that are unfamiliar.                                     Electronically signed by Christophe Mauro MD on 2024  2:47 AM              Consults - MD Consult Notes      Consults signed by Jose Miranda MD at 2024  3:28 PM     Author: Jose Miranda MD Service: Gastroenterology Author Type: Physician    Filed: 2024  3:28 PM Date of Service: 2024  3:22 PM Status: Signed    : Jose Miranda MD (Physician)     Consult Orders    1. Consult to Gastroenterology [[id number]] ordered by Zack Lara DO at 24 0940                        Consultation Note        Noemy Barrios Patient Status:  Inpatient    1931 MRN [id number]   Location  [facility] 4NW-A Attending Zack Lara*   Hosp Day # 0 PCP Andrew Montilla MD       Reason for Consultation   Abdominal pain         History of Present Illness   -Patient is a pleasant 89-year-old female with history of gallstones for which she underwent an ERCP with biliary stent in 2019 at [facility], who presents with abdominal pain, right upper quadrant, not associated with fever, vomiting or diarrhea  -Had cholelithiasis and choledocholithiasis diagnosed in 2019, and was recommended cholecystectomy at the time, but chose not to follow through  - Does not recall ever going back for recommended ERCP, unsure if the bile duct stent was ever removed  -Received 1 additional dose of medication for milder pain today, but overall feels better           PMH/MEDS/ALLERGIES/SH/FH:     Past Medical History:    Depression    Hearing impairment    Psychiatric disorder    ANXIETY    Thyroid disease    Unspecified essential hypertension    Visual impairment      Past Surgical History:   Procedure Laterality Date    Colonoscopy          No recently discontinued medications to reconcile   No current facility-administered medications on file prior to encounter.     Current Outpatient Medications on File Prior to Encounter   Medication Sig Dispense Refill    ALPRAZolam 0.25 MG Oral Tab Take 1 tablet (0.25 mg total) by mouth 2 (two) times daily as needed for Anxiety.      denosumab 60 MG/ML Subcutaneous Solution Prefilled Syringe Inject 1 mL (60 mg total) into the skin one time.      valsartan 80 MG Oral Tab Take 1 tablet (80 mg total) by mouth daily.      Venlafaxine HCl ER 75 MG Oral Tablet 24 Hr Take 0.5 tablets (37.5 mg total) by mouth daily.      Levothyroxine Sodium (SYNTHROID, LEVOTHROID) 75 MCG Oral Tab Take 50 mcg by mouth before breakfast.      Cholecalciferol (VITAMIN D3) 250 MCG (42130 UT) Oral Cap Take by mouth. (Patient not taking: Reported on 5/25/2024)      metoprolol Tartrate (LOPRESSOR) 25 MG Oral Tab  Take 0.5 tablets by mouth 2 (two) times daily. (Patient not taking: Reported on 5/25/2024) 30 tablet 11    Vitamin B-12 (VITAMIN B12) 500 MCG Oral Tab Take 500 mcg by mouth daily. (Patient not taking: Reported on 5/25/2024)      aspirin 81 MG Oral Tab Take 81 mg by mouth daily. Taken 3 times a week - Monday Wednesday and Friday (Patient not taking: Reported on 5/25/2024)         Current Allergies: No Known Allergies    Social History     Occupational History    Not on file   Tobacco Use    Smoking status: Never    Smokeless tobacco: Never   Substance and Sexual Activity    Alcohol use: Yes     Comment: VERY RARE    Drug use: Not on file    Sexual activity: Not on file       Marital Status:    Lives alone?:    Occupation:   Taking fiber supplements?:    Tattoos?:   Body piercing?:   High risk sexual behavior?:    Advance Directive:          History reviewed. No pertinent family history.           MEDICATIONS      [COMPLETED] cefTRIAXone (Rocephin) 1 g in D5W 100 mL IVPB-ADD  1 g Intravenous Once    levothyroxine (Synthroid) tab 50 mcg  50 mcg Oral Before breakfast    ALPRAZolam (Xanax) tab 0.25 mg  0.25 mg Oral BID PRN    venlafaxine ER (Effexor-XR) 24 hr cap 37.5 mg  37.5 mg Oral Daily    losartan (Cozaar) tab 50 mg  50 mg Oral Daily    acetaminophen (Tylenol Extra Strength) tab 1,000 mg  1,000 mg Oral Q4H PRN    melatonin tab 3 mg  3 mg Oral Nightly PRN    glycerin-hypromellose- (Artificial Tears) 0.2-0.2-1 % ophthalmic solution 1 drop  1 drop Both Eyes QID PRN    sodium chloride (Saline Mist) 0.65 % nasal solution 1 spray  1 spray Each Nare Q3H PRN    dextrose in lactated ringers 5% infusion  100 mL/hr Intravenous Continuous    ondansetron (Zofran) 4 MG/2ML injection 4 mg  4 mg Intravenous Q6H PRN    metoclopramide (Reglan) 5 mg/mL injection 10 mg  10 mg Intravenous Q8H PRN    polyethylene glycol (PEG 3350) (Miralax) 17 g oral packet 17 g  17 g Oral Daily PRN    sennosides (Senokot) tab 17.2 mg  17.2 mg  Oral Nightly PRN    bisacodyl (Dulcolax) 10 MG rectal suppository 10 mg  10 mg Rectal Daily PRN    fleet enema (Fleet) 7-19 GM/118ML rectal enema 133 mL  1 enema Rectal Once PRN    morphINE PF 2 MG/ML injection 1 mg  1 mg Intravenous Q2H PRN    Or    morphINE PF 2 MG/ML injection 2 mg  2 mg Intravenous Q2H PRN    Or    morphINE PF 4 MG/ML injection 4 mg  4 mg Intravenous Q2H PRN    [START ON 5/27/2024] cefTRIAXone (Rocephin) 1 g in D5W 100 mL IVPB-ADD  1 g Intravenous Q24H    sodium chloride 0.9% infusion   Intravenous Continuous    [COMPLETED] ondansetron (Zofran) 4 MG/2ML injection 4 mg  4 mg Intravenous Once    [COMPLETED] ketorolac (Toradol) 15 MG/ML injection 15 mg  15 mg Intravenous Once    morphINE PF 2 MG/ML injection 2 mg  2 mg Intravenous Q30 Min PRN              ROS:     A comprehensive 14 point review of systems was completed.  Pertinent positives and negatives noted in the the HPI.          Physical Exam     Vital signs:  /75 (BP Location: Right arm)   Pulse 81   Temp 97.9 °F (36.6 °C) (Oral)   Resp 14   Wt 143 lb 4.8 oz (65 kg)   SpO2 92%   BMI 24.60 kg/m²         Physical Exam        General: Appears alert, oriented x 3 and in no acute distress.  Hard of hearing  HEENT: Normal. No scleral icterus.   NECK: Supple. No neck vein distention. Thyroid not enlarged. No lymphadenopathy.  CV: S1 and S2 normal. No murmurs or gallops.  LUNGS: Clear to percussion and auscultation.  ABDOMEN: Soft and non-distended. Non-tender. No masses. Bowel sounds are present.  BACK: No CVA tenderness.  EXTREMITIES: No edema, cyanosis or clubbing.  SKIN: Warm and dry.         IMAGING/LABS       Labs:   Lab Results   Component Value Date    WBC 8.3 05/26/2024    HGB 13.6 05/26/2024    HCT 40.4 05/26/2024    .0 05/26/2024    CREATSERUM 0.68 05/26/2024    BUN 5 05/26/2024     05/26/2024    K 3.9 05/26/2024     05/26/2024    CO2 25.0 05/26/2024     05/26/2024    CA 9.2 05/26/2024    ALB 3.7  05/26/2024    ALKPHO 56 05/26/2024    BILT 0.4 05/26/2024    AST 19 05/26/2024    ALT 12 05/26/2024    LIP 49 05/25/2024     Recent Labs   Lab 05/25/24 2233 05/26/24  1353   * 143*   BUN 10 5*   CREATSERUM 0.75 0.68   CA 9.0 9.2   * 137   K 4.0 3.9    106   CO2 23.0 25.0     Recent Labs   Lab 05/26/24  1353   RBC 4.36   HGB 13.6   HCT 40.4   MCV 92.7   MCH 31.2   MCHC 33.7   RDW 12.8   NEPRELIM 5.95   WBC 8.3   .0       Recent Labs   Lab 05/25/24 2233 05/26/24  1353   ALT 14 12*   AST 22 19       Imaging:   US GALLBLADDER (CPT=76705)  Narrative: PROCEDURE:  US GALLBLADDER (CPT=76705)     COMPARISON:  None.     INDICATIONS:  pain abd     TECHNIQUE:  Real time gray-scale ultrasound was used to evaluate the gallbladder.       PATIENT STATED HISTORY: (As transcribed by Technologist)           FINDINGS:       BILIARY:  Cholelithiasis is noted with layering sludge in the gallbladder.  Negative sonographic Parker sign.  Common duct is distended measuring up to 11.3 cm.  There is suggestion of debris versus a stone in the distal aspect of the duct which may   represent choledocholithiasis.                   Impression: CONCLUSION:    1. Dilatation of the common duct measuring up to 1.1 cm is noted.  Questionable debris versus a stone in the distal aspect of duct may represent choledocholithiasis.  This may be further evaluated with ERCP.  Cholelithiasis is noted without evidence of   cholecystitis.        LOCATION:  [facility]           Dictated by (CST): Deangelo Purcell MD on 5/26/2024 at 10:45 AM       Finalized by (CST): Deangelo Purcell MD on 5/26/2024 at 10:46 AM        CTA Chest/aBd/pelvis 1/2020-  CONCLUSION:    1. No CT evidence for pulmonary embolism.   2. Biliary stent with pneumobilia.  Cholelithiasis with associated soft tissue density and adjacent soft tissue density mass, neoplastic process cannot be excluded, correlate clinically.   3. 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  This was not present on most recent CTA of the chest performed 7/1/2014, correlate clinically.   4. Diverticular disease without evidence for acute diverticulitis.          IMPRESSION:   1.  89-year-old female with a history of cholelithiasis, choledocholithiasis, biliary stenting at [facility] in 2019, presenting for evaluation of right upper quadrant abdominal pain.  -LFTs are normal, ultrasound suggests cholelithiasis and choledocholithiasis  - Prior CT imaging from 2020 with question of possible soft tissue mass adjacent to gallbladder             PLAN:     1.  Plan MRI, MRCP  2.  Repeat CBC CMP  3.  Surgical consult-discussed with Dr. Mae  4.  Pending results, consider ERCP         Jose Miranda MD  3:22 PM  5/26/2024  [facility]  [phone]                Electronically signed by Jose Miranda MD on 5/26/2024  3:28 PM           D/C Summary    No notes of this type exist for this encounter.     Physical Therapy Notes (last 72 hours)  Notes from 5/28/2024  4:44 PM through 5/31/2024  4:44 PM   No notes of this type exist for this encounter.        Occupational Therapy Notes (last 72 hours)      Occupational Therapy Note signed by Sara Comer OT at 5/29/2024  1:33 PM  Version 1 of 1    Author: Sara Comer OT Service: Rehab Author Type: Occupational Therapist    Filed: 5/29/2024  1:33 PM Date of Service: 5/29/2024  1:29 PM Status: Signed    : Sara Comer OT (Occupational Therapist)       OT orders received, chart reviewed. Attempted to see patient for OT eval this pm, however noted patient with recent BP of 193/87. Will hold at this time and reattempt as able and as patient appropriate. RN aware and in agreement.             Video Swallow Study Notes    No notes of this type exist for this encounter.     SLP Notes    No notes of this type exist for this encounter.     Multidisciplinary Problems     Active Goals     Not on file

## (undated) NOTE — LETTER
[address]  [address]  Authorization for Surgical Operation and Procedure     Date:_05/29/2024__________                                                                                                         Time:__________  I hereby authorize Surgeon(s):  Emily Mae MD, my physician and his/her assistants (if applicable), which may include medical students, residents, and/or fellows, to perform the following surgical operation/ procedure and administer such anesthesia as may be determined necessary by my physician:  Operation/Procedure name (s) Procedure(s):  LAPAROSCOPIC CHOLECYSTECTOMY WITH ICG on Noemy ABHISHEK Barrios   2.   I recognize that during the surgical operation/procedure, unforeseen conditions may necessitate additional or different procedures than those listed above.  I, therefore, further authorize and request that the above-named surgeon, assistants, or designees perform such procedures as are, in their judgment, necessary and desirable.    3.   My surgeon/physician has discussed prior to my surgery the potential benefits, risks and side effects of this procedure; the likelihood of achieving goals; and potential problems that might occur during recuperation.  They also discussed reasonable alternatives to the procedure, including risks, benefits, and side effects related to the alternatives and risks related to not receiving this procedure.  I have had all my questions answered and I acknowledge that no guarantee has been made as to the result that may be obtained.    4.   Should the need arise during my operation/procedure, which includes change of level of care prior to discharge, I also consent to the administration of blood and/or blood products.  Further, I understand that despite careful testing and screening of blood or blood products by collecting agencies, I may still be subject to ill effects as a result of receiving a blood transfusion and/or  blood products.  The following are some, but not all, of the potential risks that can occur: fever and allergic reactions, hemolytic reactions, transmission of diseases such as Hepatitis, AIDS and Cytomegalovirus (CMV) and fluid overload.  In the event that I wish to have an autologous transfusion of my own blood, or a directed donor transfusion, I will discuss this with my physician.  Check only if Refusing Blood or Blood Products  I understand refusal of blood or blood products as deemed necessary by my physician may have serious consequences to my condition to include possible death. I hereby assume responsibility for my refusal and release the hospital, its personnel, and my physicians from any responsibility for the consequences of my refusal.          o  Refuse      5.   I authorize the use of any specimen, organs, tissues, body parts or foreign objects that may be removed from my body during the operation/procedure for diagnosis, research or teaching purposes and their subsequent disposal by hospital authorities.  I also authorize the release of specimen test results and/or written reports to my treating physician on the hospital medical staff or other referring or consulting physicians involved in my care, at the discretion of the Pathologist or my treating physician.    6.   I consent to the photographing or videotaping of the operations or procedures to be performed, including appropriate portions of my body for medical, scientific, or educational purposes, provided my identity is not revealed by the pictures or by descriptive texts accompanying them.  If the procedure has been photographed/videotaped, the surgeon will obtain the original picture, image, videotape or CD.  The hospital will not be responsible for storage, release or maintenance of the picture, image, tape or CD.    7.   I consent to the presence of a  or observers in the operating room as deemed necessary by my physician  or their designees.    8.   I recognize that in the event my procedure results in extended X-Ray/fluoroscopy time, I may develop a skin reaction.    9. If I have a Do Not Attempt Resuscitation (DNAR) order in place, that status will be suspended while in the operating room, procedural suite, and during the recovery period unless otherwise explicitly stated by me (or a person authorized to consent on my behalf). The surgeon or my attending physician will determine when the applicable recovery period ends for purposes of reinstating the DNAR order.  10. Patients having a sterilization procedure: I understand that if the procedure is successful the results will be permanent and it will therefore be impossible for me to inseminate, conceive, or bear children.  I also understand that the procedure is intended to result in sterility, although the result has not been guaranteed.   11. I acknowledge that my physician has explained sedation/analgesia administration to me including the risk and benefits I consent to the administration of sedation/analgesia as may be necessary or desirable in the judgment of my physician.    I CERTIFY THAT I HAVE READ AND FULLY UNDERSTAND THE ABOVE CONSENT TO OPERATION and/or OTHER PROCEDURE.    _________________________________________  __________________________________  Signature of Patient     Signature of Responsible Person         ___________________________________         Printed Name of Responsible Person           _________________________________                 Relationship to Patient  _________________________________________  ______________________________  Signature of Witness          Date  Time      Patient Name: Noemy M Sophie     : 1931                 Printed: May 29, 2024     Medical Record #: [id number]                     Page 1 of [address] [place] [address]  [address]    Consent for Anesthesia    I,  Noemy Barrios agree to be cared for by an anesthesiologist, who is specially trained to monitor me and give me medicine to put me to sleep or keep me comfortable during my procedure    I understand that my anesthesiologist is not an employee or agent of [facility] [employer]. He or she works for [employer].    As the patient asking for anesthesia services, I agree to:  Allow the anesthesiologist (anesthesia doctor) to give me medicine and do additional procedures as necessary. Some examples are: Starting or using an “IV” to give me medicine, fluids or blood during my procedure, and having a breathing tube placed to help me breathe when I’m asleep (intubation). In the event that my heart stops working properly, I understand that my anesthesiologist will make every effort to sustain my life, unless otherwise directed by [facility] Do Not Resuscitate documents.  Tell my anesthesia doctor before my procedure:  If I am pregnant.  The last time that I ate or drank.  All of the medicines I take (including prescriptions, herbal supplements, and pills I can buy without a prescription (including street drugs/illegal medications). Failure to inform my anesthesiologist about these medicines may increase my risk of anesthetic complications.  If I am allergic to anything or have had a reaction to anesthesia before.  I understand how the anesthesia medicine will help me (benefits).  I understand that with any type of anesthesia medicine there are risks:  The most common risks are: nausea, vomiting, sore throat, muscle soreness, damage to my eyes, mouth, or teeth (from breathing tube placement).  Rare risks include: remembering what happened during my procedure, allergic reactions to medications, injury to my airway, heart, lungs, vision, nerves, or muscles and in extremely rare instances death.  My doctor has explained to me other choices available to me for my care  (alternatives).  Pregnant Patients (“epidural”):  I understand that the risks of having an epidural (medicine given into my back to help control pain during labor), include itching, low blood pressure, difficulty urinating, headache or slowing of the baby’s heart. Very rare risks include infection, bleeding, seizure, irregular heart rhythms and nerve injury.  Regional Anesthesia (“spinal”, “epidural”, & “nerve blocks”):  I understand that rare but potential complications include headache, bleeding, infection, seizure, irregular heart rhythms, and nerve injury.    I can change my mind about having anesthesia services at any time before I get the medicine.    _____________________________________________________________________________  Patient (or Representative) Signature/Relationship to Patient  Date   Time    _____________________________________________________________________________   Name (if used)    Language/Organization   Time    _____________________________________________________________________________  Anesthesiologist Signature     Date   Time  I have discussed the procedure and information above with the patient (or patient’s representative) and answered their questions. The patient or their representative has agreed to have anesthesia services.    _____________________________________________________________________________  Witness        Date   Time  I have verified that the signature is that of the patient or patient’s representative, and that it was signed before the procedure  Patient Name: Noemy WEISS Sophie     : 1931                 Printed: May 29, 2024     Medical Record #: [id number]                     Page 2 of 2

## (undated) NOTE — ED AVS SNAPSHOT
Fernandapaola Ruelasjefe   MRN: [id number]    Department:  [facility] Emergency Department   Date of Visit:  1/7/2020           Disclosure     Insurance plans vary and the physician(s) referred by the ER may not be covered by your plan.  Please contact tell this physician (or your personal doctor if your instructions are to return to your personal doctor) about any new or lasting problems. The primary care or specialist physician will see patients referred from the [facility] Emergency Department.  Severo Pastures

## (undated) NOTE — LETTER
[address]  [address]  Authorization for Surgical Operation and Procedure     Date:_05/29/2024_____                                                                                                         Time:__________  I hereby authorize Surgeon(s):  Emily Mae MD, my physician and his/her assistants (if applicable), which may include medical students, residents, and/or fellows, to perform the following surgical operation/ procedure and administer such anesthesia as may be determined necessary by my physician:  Operation/Procedure name (s) Procedure(s):  LAPAROSCOPIC CHOLECYSTECTOMY WITH ICG on Noemymahnaz Barrios   2.   I recognize that during the surgical operation/procedure, unforeseen conditions may necessitate additional or different procedures than those listed above.  I, therefore, further authorize and request that the above-named surgeon, assistants, or designees perform such procedures as are, in their judgment, necessary and desirable.    3.   My surgeon/physician has discussed prior to my surgery the potential benefits, risks and side effects of this procedure; the likelihood of achieving goals; and potential problems that might occur during recuperation.  They also discussed reasonable alternatives to the procedure, including risks, benefits, and side effects related to the alternatives and risks related to not receiving this procedure.  I have had all my questions answered and I acknowledge that no guarantee has been made as to the result that may be obtained.    4.   Should the need arise during my operation/procedure, which includes change of level of care prior to discharge, I also consent to the administration of blood and/or blood products.  Further, I understand that despite careful testing and screening of blood or blood products by collecting agencies, I may still be subject to ill effects as a result of receiving a blood transfusion and/or  blood products.  The following are some, but not all, of the potential risks that can occur: fever and allergic reactions, hemolytic reactions, transmission of diseases such as Hepatitis, AIDS and Cytomegalovirus (CMV) and fluid overload.  In the event that I wish to have an autologous transfusion of my own blood, or a directed donor transfusion, I will discuss this with my physician.  Check only if Refusing Blood or Blood Products  I understand refusal of blood or blood products as deemed necessary by my physician may have serious consequences to my condition to include possible death. I hereby assume responsibility for my refusal and release the hospital, its personnel, and my physicians from any responsibility for the consequences of my refusal.          o  Refuse      5.   I authorize the use of any specimen, organs, tissues, body parts or foreign objects that may be removed from my body during the operation/procedure for diagnosis, research or teaching purposes and their subsequent disposal by hospital authorities.  I also authorize the release of specimen test results and/or written reports to my treating physician on the hospital medical staff or other referring or consulting physicians involved in my care, at the discretion of the Pathologist or my treating physician.    6.   I consent to the photographing or videotaping of the operations or procedures to be performed, including appropriate portions of my body for medical, scientific, or educational purposes, provided my identity is not revealed by the pictures or by descriptive texts accompanying them.  If the procedure has been photographed/videotaped, the surgeon will obtain the original picture, image, videotape or CD.  The hospital will not be responsible for storage, release or maintenance of the picture, image, tape or CD.    7.   I consent to the presence of a  or observers in the operating room as deemed necessary by my physician  or their designees.    8.   I recognize that in the event my procedure results in extended X-Ray/fluoroscopy time, I may develop a skin reaction.    9. If I have a Do Not Attempt Resuscitation (DNAR) order in place, that status will be suspended while in the operating room, procedural suite, and during the recovery period unless otherwise explicitly stated by me (or a person authorized to consent on my behalf). The surgeon or my attending physician will determine when the applicable recovery period ends for purposes of reinstating the DNAR order.  10. Patients having a sterilization procedure: I understand that if the procedure is successful the results will be permanent and it will therefore be impossible for me to inseminate, conceive, or bear children.  I also understand that the procedure is intended to result in sterility, although the result has not been guaranteed.   11. I acknowledge that my physician has explained sedation/analgesia administration to me including the risk and benefits I consent to the administration of sedation/analgesia as may be necessary or desirable in the judgment of my physician.    I CERTIFY THAT I HAVE READ AND FULLY UNDERSTAND THE ABOVE CONSENT TO OPERATION and/or OTHER PROCEDURE.    _________________________________________  __________________________________  Signature of Patient     Signature of Responsible Person         ___________________________________         Printed Name of Responsible Person           _________________________________                 Relationship to Patient  _________________________________________  ______________________________  Signature of Witness          Date  Time      Patient Name: Noemy M Sophie     : 1931                 Printed: May 29, 2024     Medical Record #: [id number]                     Page [address] of [address] [place] [address]  [id number]    Consent for Anesthesia    I,  Noemy Barrios agree to be cared for by an anesthesiologist, who is specially trained to monitor me and give me medicine to put me to sleep or keep me comfortable during my procedure    I understand that my anesthesiologist is not an employee or agent of [facility] [employer]. He or she works for [employer].    As the patient asking for anesthesia services, I agree to:  Allow the anesthesiologist (anesthesia doctor) to give me medicine and do additional procedures as necessary. Some examples are: Starting or using an “IV” to give me medicine, fluids or blood during my procedure, and having a breathing tube placed to help me breathe when I’m asleep (intubation). In the event that my heart stops working properly, I understand that my anesthesiologist will make every effort to sustain my life, unless otherwise directed by [facility] Do Not Resuscitate documents.  Tell my anesthesia doctor before my procedure:  If I am pregnant.  The last time that I ate or drank.  All of the medicines I take (including prescriptions, herbal supplements, and pills I can buy without a prescription (including street drugs/illegal medications). Failure to inform my anesthesiologist about these medicines may increase my risk of anesthetic complications.  If I am allergic to anything or have had a reaction to anesthesia before.  I understand how the anesthesia medicine will help me (benefits).  I understand that with any type of anesthesia medicine there are risks:  The most common risks are: nausea, vomiting, sore throat, muscle soreness, damage to my eyes, mouth, or teeth (from breathing tube placement).  Rare risks include: remembering what happened during my procedure, allergic reactions to medications, injury to my airway, heart, lungs, vision, nerves, or muscles and in extremely rare instances death.  My doctor has explained to me other choices available to me for my care  (alternatives).  Pregnant Patients (“epidural”):  I understand that the risks of having an epidural (medicine given into my back to help control pain during labor), include itching, low blood pressure, difficulty urinating, headache or slowing of the baby’s heart. Very rare risks include infection, bleeding, seizure, irregular heart rhythms and nerve injury.  Regional Anesthesia (“spinal”, “epidural”, & “nerve blocks”):  I understand that rare but potential complications include headache, bleeding, infection, seizure, irregular heart rhythms, and nerve injury.    I can change my mind about having anesthesia services at any time before I get the medicine.    _____________________________________________________________________________  Patient (or Representative) Signature/Relationship to Patient  Date   Time    _____________________________________________________________________________   Name (if used)    Language/Organization   Time    _____________________________________________________________________________  Anesthesiologist Signature     Date   Time  I have discussed the procedure and information above with the patient (or patient’s representative) and answered their questions. The patient or their representative has agreed to have anesthesia services.    _____________________________________________________________________________  Witness        Date   Time  I have verified that the signature is that of the patient or patient’s representative, and that it was signed before the procedure  Patient Name: Noemy WEISS Sophie     : 1931                 Printed: May 29, 2024     Medical Record #: [id number]                     Page 2 of 2

## (undated) NOTE — LETTER
[address]  [address]  Authorization for Surgical Operation and Procedure     Date:___________                                                                                                         Time:__________  I hereby authorize Surgeon(s):  Emily Mae MD, my physician and his/her assistants (if applicable), which may include medical students, residents, and/or fellows, to perform the following surgical operation/ procedure and administer such anesthesia as may be determined necessary by my physician:  Operation/Procedure name (s) Procedure(s):  LAPAROSCOPIC CHOLECYSTECTOMY WITH ICG AND CHOLANGIOGRAM on Noemy ABHISHEK Barrios   2.   I recognize that during the surgical operation/procedure, unforeseen conditions may necessitate additional or different procedures than those listed above.  I, therefore, further authorize and request that the above-named surgeon, assistants, or designees perform such procedures as are, in their judgment, necessary and desirable.    3.   My surgeon/physician has discussed prior to my surgery the potential benefits, risks and side effects of this procedure; the likelihood of achieving goals; and potential problems that might occur during recuperation.  They also discussed reasonable alternatives to the procedure, including risks, benefits, and side effects related to the alternatives and risks related to not receiving this procedure.  I have had all my questions answered and I acknowledge that no guarantee has been made as to the result that may be obtained.    4.   Should the need arise during my operation/procedure, which includes change of level of care prior to discharge, I also consent to the administration of blood and/or blood products.  Further, I understand that despite careful testing and screening of blood or blood products by collecting agencies, I may still be subject to ill effects as a result of receiving a blood transfusion  and/or blood products.  The following are some, but not all, of the potential risks that can occur: fever and allergic reactions, hemolytic reactions, transmission of diseases such as Hepatitis, AIDS and Cytomegalovirus (CMV) and fluid overload.  In the event that I wish to have an autologous transfusion of my own blood, or a directed donor transfusion, I will discuss this with my physician.  Check only if Refusing Blood or Blood Products  I understand refusal of blood or blood products as deemed necessary by my physician may have serious consequences to my condition to include possible death. I hereby assume responsibility for my refusal and release the hospital, its personnel, and my physicians from any responsibility for the consequences of my refusal.          o  Refuse      5.   I authorize the use of any specimen, organs, tissues, body parts or foreign objects that may be removed from my body during the operation/procedure for diagnosis, research or teaching purposes and their subsequent disposal by hospital authorities.  I also authorize the release of specimen test results and/or written reports to my treating physician on the hospital medical staff or other referring or consulting physicians involved in my care, at the discretion of the Pathologist or my treating physician.    6.   I consent to the photographing or videotaping of the operations or procedures to be performed, including appropriate portions of my body for medical, scientific, or educational purposes, provided my identity is not revealed by the pictures or by descriptive texts accompanying them.  If the procedure has been photographed/videotaped, the surgeon will obtain the original picture, image, videotape or CD.  The hospital will not be responsible for storage, release or maintenance of the picture, image, tape or CD.    7.   I consent to the presence of a  or observers in the operating room as deemed necessary by my  physician or their designees.    8.   I recognize that in the event my procedure results in extended X-Ray/fluoroscopy time, I may develop a skin reaction.    9. If I have a Do Not Attempt Resuscitation (DNAR) order in place, that status will be suspended while in the operating room, procedural suite, and during the recovery period unless otherwise explicitly stated by me (or a person authorized to consent on my behalf). The surgeon or my attending physician will determine when the applicable recovery period ends for purposes of reinstating the DNAR order.  10. Patients having a sterilization procedure: I understand that if the procedure is successful the results will be permanent and it will therefore be impossible for me to inseminate, conceive, or bear children.  I also understand that the procedure is intended to result in sterility, although the result has not been guaranteed.   11. I acknowledge that my physician has explained sedation/analgesia administration to me including the risk and benefits I consent to the administration of sedation/analgesia as may be necessary or desirable in the judgment of my physician.    I CERTIFY THAT I HAVE READ AND FULLY UNDERSTAND THE ABOVE CONSENT TO OPERATION and/or OTHER PROCEDURE.    _________________________________________  __________________________________  Signature of Patient     Signature of Responsible Person         ___________________________________         Printed Name of Responsible Person           _________________________________                 Relationship to Patient  _________________________________________  ______________________________  Signature of Witness          Date  Time      Patient Name: Noemy M Sophie     : 1931                 Printed: May 29, 2024     Medical Record #: [id number]                     Page 1 of 2                                    [address]  [address]    Consent for  Anesthesia    I, Noemy Barrios agree to be cared for by an anesthesiologist, who is specially trained to monitor me and give me medicine to put me to sleep or keep me comfortable during my procedure    I understand that my anesthesiologist is not an employee or agent of [facility] or [employer]. He or she works for [employer].    As the patient asking for anesthesia services, I agree to:  Allow the anesthesiologist (anesthesia doctor) to give me medicine and do additional procedures as necessary. Some examples are: Starting or using an “IV” to give me medicine, fluids or blood during my procedure, and having a breathing tube placed to help me breathe when I’m asleep (intubation). In the event that my heart stops working properly, I understand that my anesthesiologist will make every effort to sustain my life, unless otherwise directed by [facility] Do Not Resuscitate documents.  Tell my anesthesia doctor before my procedure:  If I am pregnant.  The last time that I ate or drank.  All of the medicines I take (including prescriptions, herbal supplements, and pills I can buy without a prescription (including street drugs/illegal medications). Failure to inform my anesthesiologist about these medicines may increase my risk of anesthetic complications.  If I am allergic to anything or have had a reaction to anesthesia before.  I understand how the anesthesia medicine will help me (benefits).  I understand that with any type of anesthesia medicine there are risks:  The most common risks are: nausea, vomiting, sore throat, muscle soreness, damage to my eyes, mouth, or teeth (from breathing tube placement).  Rare risks include: remembering what happened during my procedure, allergic reactions to medications, injury to my airway, heart, lungs, vision, nerves, or muscles and in extremely rare instances death.  My doctor has explained to me other choices available to me for my  care (alternatives).  Pregnant Patients (“epidural”):  I understand that the risks of having an epidural (medicine given into my back to help control pain during labor), include itching, low blood pressure, difficulty urinating, headache or slowing of the baby’s heart. Very rare risks include infection, bleeding, seizure, irregular heart rhythms and nerve injury.  Regional Anesthesia (“spinal”, “epidural”, & “nerve blocks”):  I understand that rare but potential complications include headache, bleeding, infection, seizure, irregular heart rhythms, and nerve injury.    I can change my mind about having anesthesia services at any time before I get the medicine.    _____________________________________________________________________________  Patient (or Representative) Signature/Relationship to Patient  Date   Time    _____________________________________________________________________________   Name (if used)    Language/Organization   Time    _____________________________________________________________________________  Anesthesiologist Signature     Date   Time  I have discussed the procedure and information above with the patient (or patient’s representative) and answered their questions. The patient or their representative has agreed to have anesthesia services.    _____________________________________________________________________________  Witness        Date   Time  I have verified that the signature is that of the patient or patient’s representative, and that it was signed before the procedure  Patient Name: Noemy WEISS Richmustapha     : 1931                 Printed: May 29, 2024     Medical Record #: [id number]                     Page 2 of 2

## (undated) NOTE — LETTER
[address]  [address]  Authorization for Surgical Operation and Procedure     Date:___________                                                                                                         Time:__________  I hereby authorize Surgeon(s):  Emily Mae MD, my physician and his/her assistants (if applicable), which may include medical students, residents, and/or fellows, to perform the following surgical operation/ procedure and administer such anesthesia as may be determined necessary by my physician:  Operation/Procedure name (s) Procedure(s):  LAPAROSCOPIC CHOLECYSTECTOMY WITH ICG on Noemy WEISS Sophie   2.   I recognize that during the surgical operation/procedure, unforeseen conditions may necessitate additional or different procedures than those listed above.  I, therefore, further authorize and request that the above-named surgeon, assistants, or designees perform such procedures as are, in their judgment, necessary and desirable.    3.   My surgeon/physician has discussed prior to my surgery the potential benefits, risks and side effects of this procedure; the likelihood of achieving goals; and potential problems that might occur during recuperation.  They also discussed reasonable alternatives to the procedure, including risks, benefits, and side effects related to the alternatives and risks related to not receiving this procedure.  I have had all my questions answered and I acknowledge that no guarantee has been made as to the result that may be obtained.    4.   Should the need arise during my operation/procedure, which includes change of level of care prior to discharge, I also consent to the administration of blood and/or blood products.  Further, I understand that despite careful testing and screening of blood or blood products by collecting agencies, I may still be subject to ill effects as a result of receiving a blood transfusion and/or blood  products.  The following are some, but not all, of the potential risks that can occur: fever and allergic reactions, hemolytic reactions, transmission of diseases such as Hepatitis, AIDS and Cytomegalovirus (CMV) and fluid overload.  In the event that I wish to have an autologous transfusion of my own blood, or a directed donor transfusion, I will discuss this with my physician.  Check only if Refusing Blood or Blood Products  I understand refusal of blood or blood products as deemed necessary by my physician may have serious consequences to my condition to include possible death. I hereby assume responsibility for my refusal and release the hospital, its personnel, and my physicians from any responsibility for the consequences of my refusal.          o  Refuse      5.   I authorize the use of any specimen, organs, tissues, body parts or foreign objects that may be removed from my body during the operation/procedure for diagnosis, research or teaching purposes and their subsequent disposal by hospital authorities.  I also authorize the release of specimen test results and/or written reports to my treating physician on the hospital medical staff or other referring or consulting physicians involved in my care, at the discretion of the Pathologist or my treating physician.    6.   I consent to the photographing or videotaping of the operations or procedures to be performed, including appropriate portions of my body for medical, scientific, or educational purposes, provided my identity is not revealed by the pictures or by descriptive texts accompanying them.  If the procedure has been photographed/videotaped, the surgeon will obtain the original picture, image, videotape or CD.  The hospital will not be responsible for storage, release or maintenance of the picture, image, tape or CD.    7.   I consent to the presence of a  or observers in the operating room as deemed necessary by my physician or  their designees.    8.   I recognize that in the event my procedure results in extended X-Ray/fluoroscopy time, I may develop a skin reaction.    9. If I have a Do Not Attempt Resuscitation (DNAR) order in place, that status will be suspended while in the operating room, procedural suite, and during the recovery period unless otherwise explicitly stated by me (or a person authorized to consent on my behalf). The surgeon or my attending physician will determine when the applicable recovery period ends for purposes of reinstating the DNAR order.  10. Patients having a sterilization procedure: I understand that if the procedure is successful the results will be permanent and it will therefore be impossible for me to inseminate, conceive, or bear children.  I also understand that the procedure is intended to result in sterility, although the result has not been guaranteed.   11. I acknowledge that my physician has explained sedation/analgesia administration to me including the risk and benefits I consent to the administration of sedation/analgesia as may be necessary or desirable in the judgment of my physician.    I CERTIFY THAT I HAVE READ AND FULLY UNDERSTAND THE ABOVE CONSENT TO OPERATION and/or OTHER PROCEDURE.    _________________________________________  __________________________________  Signature of Patient     Signature of Responsible Person         ___________________________________         Printed Name of Responsible Person           _________________________________                 Relationship to Patient  _________________________________________  ______________________________  Signature of Witness          Date  Time      Patient Name: Noemy ABHISHEK Barrios     : 1931                 Printed: May 29, 2024     Medical Record #: [id number]                     Page 1 of [address] [place] [address]  [id number]    Consent for Anesthesia    I,  Noemy Barrios agree to be cared for by an anesthesiologist, who is specially trained to monitor me and give me medicine to put me to sleep or keep me comfortable during my procedure    I understand that my anesthesiologist is not an employee or agent of [facility] [employer]. He or she works for [employer].    As the patient asking for anesthesia services, I agree to:  Allow the anesthesiologist (anesthesia doctor) to give me medicine and do additional procedures as necessary. Some examples are: Starting or using an “IV” to give me medicine, fluids or blood during my procedure, and having a breathing tube placed to help me breathe when I’m asleep (intubation). In the event that my heart stops working properly, I understand that my anesthesiologist will make every effort to sustain my life, unless otherwise directed by [facility] Do Not Resuscitate documents.  Tell my anesthesia doctor before my procedure:  If I am pregnant.  The last time that I ate or drank.  All of the medicines I take (including prescriptions, herbal supplements, and pills I can buy without a prescription (including street drugs/illegal medications). Failure to inform my anesthesiologist about these medicines may increase my risk of anesthetic complications.  If I am allergic to anything or have had a reaction to anesthesia before.  I understand how the anesthesia medicine will help me (benefits).  I understand that with any type of anesthesia medicine there are risks:  The most common risks are: nausea, vomiting, sore throat, muscle soreness, damage to my eyes, mouth, or teeth (from breathing tube placement).  Rare risks include: remembering what happened during my procedure, allergic reactions to medications, injury to my airway, heart, lungs, vision, nerves, or muscles and in extremely rare instances death.  My doctor has explained to me other choices available to me for my care  (alternatives).  Pregnant Patients (“epidural”):  I understand that the risks of having an epidural (medicine given into my back to help control pain during labor), include itching, low blood pressure, difficulty urinating, headache or slowing of the baby’s heart. Very rare risks include infection, bleeding, seizure, irregular heart rhythms and nerve injury.  Regional Anesthesia (“spinal”, “epidural”, & “nerve blocks”):  I understand that rare but potential complications include headache, bleeding, infection, seizure, irregular heart rhythms, and nerve injury.    I can change my mind about having anesthesia services at any time before I get the medicine.    _____________________________________________________________________________  Patient (or Representative) Signature/Relationship to Patient  Date   Time    _____________________________________________________________________________   Name (if used)    Language/Organization   Time    _____________________________________________________________________________  Anesthesiologist Signature     Date   Time  I have discussed the procedure and information above with the patient (or patient’s representative) and answered their questions. The patient or their representative has agreed to have anesthesia services.    _____________________________________________________________________________  Witness        Date   Time  I have verified that the signature is that of the patient or patient’s representative, and that it was signed before the procedure  Patient Name: Noemy WEISS Sophie     : 1931                 Printed: May 29, 2024     Medical Record #: [id number]                     Page 2 of 2